# Patient Record
Sex: FEMALE | Race: WHITE | Employment: FULL TIME | URBAN - METROPOLITAN AREA
[De-identification: names, ages, dates, MRNs, and addresses within clinical notes are randomized per-mention and may not be internally consistent; named-entity substitution may affect disease eponyms.]

---

## 2018-01-03 ENCOUNTER — APPOINTMENT (OUTPATIENT)
Dept: MRI IMAGING | Age: 63
DRG: 103 | End: 2018-01-03
Attending: INTERNAL MEDICINE
Payer: COMMERCIAL

## 2018-01-03 ENCOUNTER — APPOINTMENT (OUTPATIENT)
Dept: CT IMAGING | Age: 63
DRG: 103 | End: 2018-01-03
Attending: EMERGENCY MEDICINE
Payer: COMMERCIAL

## 2018-01-03 ENCOUNTER — HOSPITAL ENCOUNTER (OUTPATIENT)
Age: 63
Setting detail: OBSERVATION
Discharge: HOME | DRG: 103 | End: 2018-01-04
Attending: EMERGENCY MEDICINE | Admitting: INTERNAL MEDICINE
Payer: COMMERCIAL

## 2018-01-03 DIAGNOSIS — H93.A3 PULSATILE TINNITUS OF BOTH EARS: ICD-10-CM

## 2018-01-03 DIAGNOSIS — R42 VERTIGO: ICD-10-CM

## 2018-01-03 DIAGNOSIS — G45.9 TRANSIENT CEREBRAL ISCHEMIA, UNSPECIFIED TYPE: Primary | ICD-10-CM

## 2018-01-03 DIAGNOSIS — R53.1 LEFT-SIDED WEAKNESS: ICD-10-CM

## 2018-01-03 PROBLEM — I63.9 ACUTE ISCHEMIC STROKE (HCC): Status: ACTIVE | Noted: 2018-01-03

## 2018-01-03 LAB
ALBUMIN SERPL-MCNC: 3.9 G/DL (ref 3.5–5)
ALBUMIN/GLOB SERPL: 1.1 {RATIO} (ref 1.1–2.2)
ALP SERPL-CCNC: 99 U/L (ref 45–117)
ALT SERPL-CCNC: 34 U/L (ref 12–78)
ANION GAP SERPL CALC-SCNC: 7 MMOL/L (ref 5–15)
AST SERPL-CCNC: 18 U/L (ref 15–37)
ATRIAL RATE: 71 BPM
BASOPHILS # BLD: 0 K/UL (ref 0–0.1)
BASOPHILS NFR BLD: 0 % (ref 0–1)
BILIRUB SERPL-MCNC: 0.3 MG/DL (ref 0.2–1)
BUN SERPL-MCNC: 15 MG/DL (ref 6–20)
BUN/CREAT SERPL: 19 (ref 12–20)
CALCIUM SERPL-MCNC: 8.9 MG/DL (ref 8.5–10.1)
CALCULATED P AXIS, ECG09: 51 DEGREES
CALCULATED R AXIS, ECG10: 48 DEGREES
CALCULATED T AXIS, ECG11: 59 DEGREES
CHLORIDE SERPL-SCNC: 105 MMOL/L (ref 97–108)
CO2 SERPL-SCNC: 28 MMOL/L (ref 21–32)
CREAT SERPL-MCNC: 0.8 MG/DL (ref 0.55–1.02)
DIAGNOSIS, 93000: NORMAL
EOSINOPHIL # BLD: 0.2 K/UL (ref 0–0.4)
EOSINOPHIL NFR BLD: 2 % (ref 0–7)
ERYTHROCYTE [DISTWIDTH] IN BLOOD BY AUTOMATED COUNT: 13.3 % (ref 11.5–14.5)
GLOBULIN SER CALC-MCNC: 3.7 G/DL (ref 2–4)
GLUCOSE BLD STRIP.AUTO-MCNC: 91 MG/DL (ref 65–100)
GLUCOSE SERPL-MCNC: 96 MG/DL (ref 65–100)
HCT VFR BLD AUTO: 40.6 % (ref 35–47)
HGB BLD-MCNC: 12.8 G/DL (ref 11.5–16)
INR BLD: 1.1 (ref 0.9–1.2)
LYMPHOCYTES # BLD: 2.4 K/UL (ref 0.8–3.5)
LYMPHOCYTES NFR BLD: 23 % (ref 12–49)
MCH RBC QN AUTO: 25.8 PG (ref 26–34)
MCHC RBC AUTO-ENTMCNC: 31.5 G/DL (ref 30–36.5)
MCV RBC AUTO: 81.7 FL (ref 80–99)
MONOCYTES # BLD: 0.5 K/UL (ref 0–1)
MONOCYTES NFR BLD: 5 % (ref 5–13)
NEUTS SEG # BLD: 7.5 K/UL (ref 1.8–8)
NEUTS SEG NFR BLD: 70 % (ref 32–75)
P-R INTERVAL, ECG05: 130 MS
PLATELET # BLD AUTO: 308 K/UL (ref 150–400)
POTASSIUM SERPL-SCNC: 3.7 MMOL/L (ref 3.5–5.1)
PROT SERPL-MCNC: 7.6 G/DL (ref 6.4–8.2)
Q-T INTERVAL, ECG07: 386 MS
QRS DURATION, ECG06: 74 MS
QTC CALCULATION (BEZET), ECG08: 419 MS
RBC # BLD AUTO: 4.97 M/UL (ref 3.8–5.2)
SERVICE CMNT-IMP: NORMAL
SODIUM SERPL-SCNC: 140 MMOL/L (ref 136–145)
VENTRICULAR RATE, ECG03: 71 BPM
WBC # BLD AUTO: 10.6 K/UL (ref 3.6–11)

## 2018-01-03 PROCEDURE — 74011250636 HC RX REV CODE- 250/636: Performed by: INTERNAL MEDICINE

## 2018-01-03 PROCEDURE — 74011250637 HC RX REV CODE- 250/637: Performed by: INTERNAL MEDICINE

## 2018-01-03 PROCEDURE — 93306 TTE W/DOPPLER COMPLETE: CPT | Performed by: INTERNAL MEDICINE

## 2018-01-03 PROCEDURE — 74011250637 HC RX REV CODE- 250/637: Performed by: EMERGENCY MEDICINE

## 2018-01-03 PROCEDURE — 99285 EMERGENCY DEPT VISIT HI MDM: CPT

## 2018-01-03 PROCEDURE — 65660000000 HC RM CCU STEPDOWN

## 2018-01-03 PROCEDURE — 80053 COMPREHEN METABOLIC PANEL: CPT | Performed by: EMERGENCY MEDICINE

## 2018-01-03 PROCEDURE — 82962 GLUCOSE BLOOD TEST: CPT

## 2018-01-03 PROCEDURE — 96372 THER/PROPH/DIAG INJ SC/IM: CPT

## 2018-01-03 PROCEDURE — A9577 INJ MULTIHANCE: HCPCS | Performed by: INTERNAL MEDICINE

## 2018-01-03 PROCEDURE — 36415 COLL VENOUS BLD VENIPUNCTURE: CPT | Performed by: EMERGENCY MEDICINE

## 2018-01-03 PROCEDURE — 99218 HC RM OBSERVATION: CPT

## 2018-01-03 PROCEDURE — 93005 ELECTROCARDIOGRAM TRACING: CPT

## 2018-01-03 PROCEDURE — 85025 COMPLETE CBC W/AUTO DIFF WBC: CPT | Performed by: EMERGENCY MEDICINE

## 2018-01-03 PROCEDURE — 85610 PROTHROMBIN TIME: CPT

## 2018-01-03 PROCEDURE — 70548 MR ANGIOGRAPHY NECK W/DYE: CPT

## 2018-01-03 PROCEDURE — 70553 MRI BRAIN STEM W/O & W/DYE: CPT

## 2018-01-03 PROCEDURE — 74011000258 HC RX REV CODE- 258: Performed by: INTERNAL MEDICINE

## 2018-01-03 PROCEDURE — 70450 CT HEAD/BRAIN W/O DYE: CPT

## 2018-01-03 RX ORDER — LORAZEPAM 0.5 MG/1
.25-.5 TABLET ORAL
COMMUNITY
End: 2018-01-04

## 2018-01-03 RX ORDER — ENOXAPARIN SODIUM 100 MG/ML
40 INJECTION SUBCUTANEOUS EVERY 24 HOURS
Status: DISCONTINUED | OUTPATIENT
Start: 2018-01-03 | End: 2018-01-04 | Stop reason: HOSPADM

## 2018-01-03 RX ORDER — LEVOTHYROXINE SODIUM 25 UG/1
25 TABLET ORAL
COMMUNITY

## 2018-01-03 RX ORDER — ACETAMINOPHEN 325 MG/1
650 TABLET ORAL
Status: DISCONTINUED | OUTPATIENT
Start: 2018-01-03 | End: 2018-01-04 | Stop reason: HOSPADM

## 2018-01-03 RX ORDER — GUAIFENESIN 100 MG/5ML
325 LIQUID (ML) ORAL
Status: COMPLETED | OUTPATIENT
Start: 2018-01-03 | End: 2018-01-03

## 2018-01-03 RX ORDER — PRAVASTATIN SODIUM 40 MG/1
40 TABLET ORAL
Status: DISCONTINUED | OUTPATIENT
Start: 2018-01-03 | End: 2018-01-04

## 2018-01-03 RX ORDER — SODIUM CHLORIDE 9 MG/ML
75 INJECTION, SOLUTION INTRAVENOUS CONTINUOUS
Status: DISPENSED | OUTPATIENT
Start: 2018-01-03 | End: 2018-01-04

## 2018-01-03 RX ORDER — GUAIFENESIN 100 MG/5ML
81 LIQUID (ML) ORAL DAILY
Status: DISCONTINUED | OUTPATIENT
Start: 2018-01-04 | End: 2018-01-04

## 2018-01-03 RX ORDER — ACETAMINOPHEN 500 MG
500 TABLET ORAL
COMMUNITY

## 2018-01-03 RX ORDER — VENLAFAXINE HYDROCHLORIDE 37.5 MG/1
37.5 CAPSULE, EXTENDED RELEASE ORAL DAILY
COMMUNITY

## 2018-01-03 RX ORDER — ACETAMINOPHEN 650 MG/1
650 SUPPOSITORY RECTAL
Status: DISCONTINUED | OUTPATIENT
Start: 2018-01-03 | End: 2018-01-04 | Stop reason: HOSPADM

## 2018-01-03 RX ADMIN — ASPIRIN 81 MG 324 MG: 81 TABLET ORAL at 15:29

## 2018-01-03 RX ADMIN — SODIUM CHLORIDE 40 ML: 900 INJECTION, SOLUTION INTRAVENOUS at 18:00

## 2018-01-03 RX ADMIN — GADOBENATE DIMEGLUMINE 13 ML: 529 INJECTION, SOLUTION INTRAVENOUS at 18:00

## 2018-01-03 RX ADMIN — SODIUM CHLORIDE 75 ML/HR: 900 INJECTION, SOLUTION INTRAVENOUS at 19:17

## 2018-01-03 RX ADMIN — ENOXAPARIN SODIUM 40 MG: 40 INJECTION SUBCUTANEOUS at 19:17

## 2018-01-03 NOTE — IP AVS SNAPSHOT
[address] 
[phone] Patient: Gisselle Kang MRN: [id number] UWW:[id number] About your hospitalization You were admitted on:  January 3, 2018 You last received care in the:  [facility] 6S NEURO-SCI TELE You were discharged on:  January 4, 2018 Why you were hospitalized Your primary diagnosis was:  Left-Sided Weakness Your diagnoses also included:  Vertigo, Pulsatile Tinnitus Of Both Ears Follow-up Information Follow up With Details Comments Contact Info Sophia Botello MD  follow up post hospitalization [address] [address] [address] [facility] [address] 
[phone] Discharge Orders None A check carlos indicates which time of day the medication should be taken. My Medications CONTINUE taking these medications Instructions Each Dose to Equal  
 Morning Noon Evening Bedtime  
 acetaminophen 500 mg tablet Commonly known as:  TYLENOL Your last dose was: Your next dose is: Take 500 mg by mouth every six (6) hours as needed for Pain. 500 mg  
    
   
   
   
  
 EFFEXOR XR 37.5 mg capsule Generic drug:  venlafaxine-SR Your last dose was: Your next dose is: Take 37.5 mg by mouth daily. 37.5 mg  
    
   
   
   
  
 synthroid 25 mcg tablet Generic drug:  levothyroxine Your last dose was: Your next dose is: Take 25 mcg by mouth Daily (before breakfast). 25 mcg STOP taking these medications LORazepam 0.5 mg tablet Commonly known as:  ATIVAN Discharge Instructions Discharge Instructions PATIENT ID: Gisselle Kang MRN: [id number] YOB: 1955 DATE OF ADMISSION: 1/3/2018 11:44 AM   
DATE OF DISCHARGE: 1/4/2018 PRIMARY CARE PROVIDER: Sophia Btoello MD  
 
ATTENDING PHYSICIAN: Najma Degroot MD 
 DISCHARGING PROVIDER: Carlo Pichardo NP To contact this individual call [phone] and ask the  to page. If unavailable ask to be transferred the Adult Hospitalist Department. DISCHARGE DIAGNOSES: Headache CONSULTATIONS: IP CONSULT TO HOSPITALIST 
IP CONSULT TO NEUROLOGY PROCEDURES/SURGERIES: * No surgery found * PENDING TEST RESULTS:  
At the time of discharge the following test results are still pending: none FOLLOW UP APPOINTMENTS:  
Follow-up Information Follow up With Details Comments Contact Info Trent Silva MD  follow up post hospitalization [address] [address] [address] [place] [id number] 
[phone] ADDITIONAL CARE RECOMMENDATIONS:  
All your imaging has been negative for stroke. Your MRA did NOT show any aneurysm Normal echo It appears you had a vertiginous migraine. Please see below for more information DIET: Regular ACTIVITY: as previously WOUND CARE: none EQUIPMENT needed: none DISCHARGE MEDICATIONS: 
 See Medication Reconciliation Form · It is important that you take the medication exactly as they are prescribed. · Keep your medication in the bottles provided by the pharmacist and keep a list of the medication names, dosages, and times to be taken in your wallet. · Do not take other medications without consulting your doctor. NOTIFY YOUR PHYSICIAN FOR ANY OF THE FOLLOWING:  
Fever over 101 degrees for 24 hours. Chest pain, shortness of breath, fever, chills, nausea, vomiting, diarrhea, change in mentation, falling, weakness, bleeding. Severe pain or pain not relieved by medications. Or, any other signs or symptoms that you may have questions about. DISPOSITION: 
xx  Home With: 
 OT  PT  [facility]  RN  
  
 SNF/Inpatient Rehab/LTAC Independent/assisted living Hospice Other: CDMP Checked:  
Yes xx PROBLEM LIST Updated: 
Yes xx Signed:  
Carlo Pichardo NP 
1/4/2018 
3:54 PM 
 
 
 Migraine Headache: Care Instructions Your Care Instructions Migraines are painful, throbbing headaches that often start on one side of the head. They may cause nausea and vomiting and make you sensitive to light, sound, or smell. Without treatment, migraines can last from 4 hours to a few days. Medicines can help prevent migraines or stop them after they have started. Your doctor can help you find which ones work best for you. Follow-up care is a key part of your treatment and safety. Be sure to make and go to all appointments, and call your doctor if you are having problems. It's also a good idea to know your test results and keep a list of the medicines you take. How can you care for yourself at home? · Do not drive if you have taken a prescription pain medicine. · Rest in a quiet, dark room until your headache is gone. Close your eyes, and try to relax or go to sleep. Don't watch TV or read. · Put a cold, moist cloth or cold pack on the painful area for 10 to 20 minutes at a time. Put a thin cloth between the cold pack and your skin. · Use a warm, moist towel or a heating pad set on low to relax tight shoulder and neck muscles. · Have someone gently massage your neck and shoulders. · Take your medicines exactly as prescribed. Call your doctor if you think you are having a problem with your medicine. You will get more details on the specific medicines your doctor prescribes. · Be careful not to take pain medicine more often than the instructions allow. You could get worse or more frequent headaches when the medicine wears off. To prevent migraines · Keep a headache diary so you can figure out what triggers your headaches. Avoiding triggers may help you prevent headaches. Record when each headache began, how long it lasted, and what the pain was like.  (Was it throbbing, aching, stabbing, or dull?) Write down any other symptoms you had with the headache, such as nausea, flashing lights or dark spots, or sensitivity to bright light or loud noise. Note if the headache occurred near your period. List anything that might have triggered the headache. Triggers may include certain foods (chocolate, cheese, wine) or odors, smoke, bright light, stress, or lack of sleep. · If your doctor has prescribed medicine for your migraines, take it as directed. You may have medicine that you take only when you get a migraine and medicine that you take all the time to help prevent migraines. ¨ If your doctor has prescribed medicine for when you get a headache, take it at the first sign of a migraine, unless your doctor has given you other instructions. ¨ If your doctor has prescribed medicine to prevent migraines, take it exactly as prescribed. Call your doctor if you think you are having a problem with your medicine. · Find healthy ways to deal with stress. Migraines are most common during or right after stressful times. Take time to relax before and after you do something that has caused a migraine in the past. 
· Try to keep your muscles relaxed by keeping good posture. Check your jaw, face, neck, and shoulder muscles for tension. Try to relax them. When you sit at a desk, change positions often. And make sure to stretch for 30 seconds each hour. · Get plenty of sleep and exercise. · Eat meals on a regular schedule. Avoid foods and drinks that often trigger migraines. These include chocolate, alcohol (especially red wine and port), aspartame, monosodium glutamate (MSG), and some additives found in foods (such as hot dogs, dasilva, cold cuts, aged cheeses, and pickled foods). · Limit caffeine. Don't drink too much coffee, tea, or soda. But don't quit caffeine suddenly. That can also give you migraines. · Do not smoke or allow others to smoke around you. If you need help quitting, talk to your doctor about stop-smoking programs and medicines. These can increase your chances of quitting for good. · If you are taking birth control pills or hormone therapy, talk to your doctor about whether they are triggering your migraines. When should you call for help? Call 911 anytime you think you may need emergency care. For example, call if: 
? · You have signs of a stroke. These may include: 
¨ Sudden numbness, paralysis, or weakness in your face, arm, or leg, especially on only one side of your body. ¨ Sudden vision changes. ¨ Sudden trouble speaking. ¨ Sudden confusion or trouble understanding simple statements. ¨ Sudden problems with walking or balance. ¨ A sudden, severe headache that is different from past headaches. ?Call your doctor now or seek immediate medical care if: 
? · You have new or worse nausea and vomiting. ? · You have a new or higher fever. ? · Your headache gets much worse. ? Watch closely for changes in your health, and be sure to contact your doctor if: 
? · You are not getting better after 2 days (48 hours). Where can you learn more? Go to http://barb-leo.info/. Enter H473 in the search box to learn more about \"Migraine Headache: Care Instructions. \" Current as of: October 14, 2016 Content Version: 11.4 © 0249-3748 [employer]. Care instructions adapted under license by [employer] (which disclaims liability or warranty for this information). If you have questions about a medical condition or this instruction, always ask your healthcare professional. [address] [address] any warranty or liability for your use of this information. 
  
 
  
Dizziness: Care Instructions Your Care Instructions Dizziness is the feeling of unsteadiness or fuzziness in your head. It is different than having vertigo, which is a feeling that the room is spinning or that you are moving or falling. It is also different from lightheadedness, which is the feeling that you are about to faint. It can be hard to know what causes dizziness. Some people feel dizzy when they have migraine headaches. Sometimes bouts of flu can make you feel dizzy. Some medical conditions, such as heart problems or high blood pressure, can make you feel dizzy. Many medicines can cause dizziness, including medicines for high blood pressure, pain, or anxiety. If a medicine causes your symptoms, your doctor may recommend that you stop or change the medicine. If it is a problem with your heart, you may need medicine to help your heart work better. If there is no clear reason for your symptoms, your doctor may suggest watching and waiting for a while to see if the dizziness goes away on its own. Follow-up care is a key part of your treatment and safety. Be sure to make and go to all appointments, and call your doctor if you are having problems. It's also a good idea to know your test results and keep a list of the medicines you take. How can you care for yourself at home? · If your doctor recommends or prescribes medicine, take it exactly as directed. Call your doctor if you think you are having a problem with your medicine. · Do not drive while you feel dizzy. · Try to prevent falls. Steps you can take include: ¨ Using nonskid mats, adding grab bars near the tub, and using night-lights. ¨ Clearing your home so that walkways are free of anything you might trip on. ¨ Letting family and friends know that you have been feeling dizzy. This will help them know how to help you. When should you call for help? Call 911 anytime you think you may need emergency care. For example, call if: 
? · You passed out (lost consciousness). ? · You have dizziness along with symptoms of a heart attack. These may include: ¨ Chest pain or pressure, or a strange feeling in the chest. 
¨ Sweating. ¨ Shortness of breath. ¨ Nausea or vomiting.  
¨ Pain, pressure, or a strange feeling in the back, neck, jaw, or upper belly or in one or both shoulders or arms. ¨ Lightheadedness or sudden weakness. ¨ A fast or irregular heartbeat. ? · You have symptoms of a stroke. These may include: 
¨ Sudden numbness, tingling, weakness, or loss of movement in your face, arm, or leg, especially on only one side of your body. ¨ Sudden vision changes. ¨ Sudden trouble speaking. ¨ Sudden confusion or trouble understanding simple statements. ¨ Sudden problems with walking or balance. ¨ A sudden, severe headache that is different from past headaches. ?Call your doctor now or seek immediate medical care if: 
? · You feel dizzy and have a fever, headache, or ringing in your ears. ? · You have new or increased nausea and vomiting. ? · Your dizziness does not go away or comes back. ? Watch closely for changes in your health, and be sure to contact your doctor if: 
? · You do not get better as expected. Where can you learn more? Go to http://barb-leo.info/. Enter C961 in the search box to learn more about \"Dizziness: Care Instructions. \" Current as of: March 20, 2017 Content Version: 11.4 © 6838-3067 [employer]. Care instructions adapted under license by [employer] (which disclaims liability or warranty for this information). If you have questions about a medical condition or this instruction, always ask your healthcare professional. [address] any warranty or liability for your use of this information. [employer] Announcement We are excited to announce that we are making your provider's discharge notes available to you in [employer]ravo[employer]. You will see these notes when they are completed and signed by the physician that discharged you from your recent hospital stay.   If you have any questions or concerns about any information you see in BravoSolution, please call the Health Information Department where you were seen or reach out to your Primary Care Provider for more information about your plan of care. Introducing [facility] HE[facility]! Chun Webster introduces MyFreightWorld patient portal. Now you can access parts of your medical record, email your doctor's office, and request medication refills online. 1. In your internet browser, go to [email or web address] 2. Click on the First Time User? Click Here link in the Sign In box. You will see the New Member Sign Up page. 3. Enter your MyFreightWorld Access Code exactly as it appears below. You will not need to use this code after youve completed the sign-up process. If you do not sign up before the expiration date, you must request a new code. · MyFreightWorld Access Code: [email or web address] Expires: 4/4/2018  5:38 PM 
 
4. Enter the last four digits of your Social Security Number (xxxx) and Date of Birth (mm/dd/yyyy) as indicated and click Submit. You will be taken to the next sign-up page. 5. Create a My[employer]ightWorld ID. This will be your MyFreightWorld login ID and cannot be changed, so think of one that is secure and easy to remember. 6. Create a MyFreightWorld password. You can change your password at any time. 7. Enter your Password Reset Question and Answer. This can be used at a later time if you forget your password. 8. Enter your e-mail address. You will receive e-mail notification when new information is available in [address] 9. Click Sign Up. You can now view and download portions of your medical record. 10. Click the Download Summary menu link to download a portable copy of your medical information. If you have questions, please visit the Frequently Asked Questions section of the MyFreightWorld website. Remember, MyFreightWorld is NOT to be used for urgent needs. For medical emergencies, dial 911. Now available from your iPhone and Android! Providers Seen During Your Hospitalization Provider Specialty Primary office phone Gennaro Soares MD Emergency Medicine [phone] Anh Palacios MD Internal Medicine [phone] Les Don MD Internal Medicine [phone] Your Primary Care Physician (PCP) Primary Care Physician Office Phone Office Fax Harriett Branham [phone] You are allergic to the following Allergen Reactions Sulfa (Sulfonamide Antibiotics) Diarrhea Nausea and Vomiting Recent Documentation Height Weight BMI Smoking Status 1.575 m 63.5 kg 25.61 kg/m2 Never Assessed Emergency Contacts Name Discharge Info Relation Home Work Mobile Anita Webster DISCHARGE CAREGIVER [3] Sister [23]   [phone] Patient Belongings The following personal items are in your possession at time of discharge: 
  Dental Appliances: None  Visual Aid: Glasses, With patient      Home Medications: Kept at bedside   Jewelry: With patient  Clothing: With patient    Other Valuables: With patient Please provide this summary of care documentation to your next provider. Signatures-by signing, you are acknowledging that this After Visit Summary has been reviewed with you and you have received a copy. Patient Signature:  ____________________________________________________________ Date:  ____________________________________________________________  
  
[facility] Provider Signature:  ____________________________________________________________ Date:  ____________________________________________________________

## 2018-01-03 NOTE — IP AVS SNAPSHOT
[address] 
[phone] Patient: Jethro Mckinney MRN: [id number] BXW:[id number] A check carlos indicates which time of day the medication should be taken. My Medications CONTINUE taking these medications Instructions Each Dose to Equal  
 Morning Noon Evening Bedtime  
 acetaminophen 500 mg tablet Commonly known as:  TYLENOL Your last dose was: Your next dose is: Take 500 mg by mouth every six (6) hours as needed for Pain. 500 mg  
    
   
   
   
  
 EFFEXOR XR 37.5 mg capsule Generic drug:  venlafaxine-SR Your last dose was: Your next dose is: Take 37.5 mg by mouth daily. 37.5 mg  
    
   
   
   
  
 synthroid 25 mcg tablet Generic drug:  levothyroxine Your last dose was: Your next dose is: Take 25 mcg by mouth Daily (before breakfast). 25 mcg STOP taking these medications LORazepam 0.5 mg tablet Commonly known as:  ATIVAN

## 2018-01-03 NOTE — PROGRESS NOTES
Spiritual Care Assessment/Progress Notes    Sabina Marquez [id number]  [id number]    1955  65 y.o.  female    Patient Telephone Number: There is no home phone number on file. Synagogue Affiliation:    Language:    No emergency contact information on file. There are no active problems to display for this patient. Date: 1/3/2018       Level of Synagogue/Spiritual Activity:  [x]         Involved in chapo tradition/spiritual practice    []         Not involved in chapo tradition/spiritual practice  []         Spiritually oriented    []         Claims no spiritual orientation    []         seeking spiritual identity  []         Feels alienated from Islam practice/tradition  []         Feels angry about Islam practice/tradition  []         Spirituality/Islam tradition a resource for coping at this time.   []         Not able to assess due to medical condition    Services Provided Today:  []         crisis intervention    []         reading Scriptures  [x]         spiritual assessment    []         prayer  [x]         empathic listening/emotional support  []         rites and rituals (cite in comments)  []         life review     []         Islam support  []         theological development   []         advocacy  []         ethical dialog     []         blessing  []         bereavement support    []         support to family  []         anticipatory grief support   []         help with AMD  []         spiritual guidance    []         meditation      Spiritual Care Needs  []         Emotional Support  []         Spiritual/Synagogue Care  []         Loss/Adjustment  []         Advocacy/Referral                /Ethics  [x]         No needs expressed at               this time  []         Other: (note in               comments)  [address]  []         Follow up visits with               pt/family  []         Provide materials  []         Schedule sacraments  []         Contact Community Clergy  [x]         Follow up as needed  []         Other: (note in               comments)     Comments: I responded to the code stroke and first met Ms. Webster's  at [facility]. He was in the quinn as Ms. Guillermina Maldonado was being attended to by staff. 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 I provided pastoral support and assisted Ms. Guillermina Maldonado with a request to contact a family member of hers who works at [employer]. She had no further spiritual or emotional needs at the time and I assured her of our ongoing availability. Along with her , she was also accompanied by her sister. Spiritual Care remains available if needed. Rev. Carla Patel M.Div, [facility]

## 2018-01-03 NOTE — PROGRESS NOTES
Admission Medication Reconciliation:    Information obtained from: Patient, RX query, patient's medication list    Significant PMH/Disease States:   Past Medical History:   Diagnosis Date    Anxiety     Hypotension     Thyroid disease        Chief Complaint for this Admission:  Dizziness    Allergies:  Sulfa (sulfonamide antibiotics)    Prior to Admission Medications:   Prior to Admission Medications   Prescriptions Last Dose Informant Patient Reported? Taking? LORazepam (ATIVAN) 0.5 mg tablet 1/3/2018 at Unknown time  Yes Yes   Sig: Take 0.25-0.5 mg by mouth daily as needed for Anxiety. acetaminophen (TYLENOL) 500 mg tablet   Yes Yes   Sig: Take 500 mg by mouth every six (6) hours as needed for Pain.   levothyroxine (SYNTHROID) 25 mcg tablet 1/3/2018 at 0715  Yes Yes   Sig: Take 25 mcg by mouth Daily (before breakfast). venlafaxine-SR (EFFEXOR XR) 37.5 mg capsule 1/3/2018 at 0800  Yes Yes   Sig: Take 37.5 mg by mouth daily.       Facility-Administered Medications: None         Comments/Recommendations: added the above medications

## 2018-01-03 NOTE — ED PROVIDER NOTES
HPI Comments: 65 y.o. right-handed (\"mostly right-handed, somewhat ambidextrous) female with past medical history significant for hypotension, anxiety who presents from [facility] with chief complaint of headache. Patient complains of lightheadedness and dizziness for the past 3-4 hours with associated nausea and feeling off balance while walking. Patient developed a headache that \"started in the occipital, then moved to the temporal lobes\" approximately 3 hours ago. Patient reports over the past 20 minutes she developed left hand weakness and left leg weakness. She also notes speech difficulty starting within the past 20 minutes. Patient also reports tingling to bilateral fingers. No vomiting. There are no other acute medical concerns at this time. PCP: No primary care provider on file. Note written by Romina Bassett. Ghazala Fields, as dictated by Sherin Moe MD 12:05 PM      The history is provided by the patient. Past Medical History:   Diagnosis Date    Anxiety     Hypotension     Thyroid disease        History reviewed. No pertinent surgical history. History reviewed. No pertinent family history. Social History     Social History    Marital status: N/A     Spouse name: N/A    Number of children: N/A    Years of education: N/A     Occupational History    Not on file. Social History Main Topics    Smoking status: Not on file    Smokeless tobacco: Not on file    Alcohol use Not on file    Drug use: Not on file    Sexual activity: Not on file     Other Topics Concern    Not on file     Social History Narrative    No narrative on file         ALLERGIES: Sulfa (sulfonamide antibiotics)    Review of Systems   Constitutional: Negative for fever. Eyes: Negative for visual disturbance. Respiratory: Negative for cough, shortness of breath and wheezing. Cardiovascular: Negative for chest pain and leg swelling. Gastrointestinal: Positive for nausea.  Negative for abdominal pain, diarrhea and vomiting. Genitourinary: Negative for dysuria. Musculoskeletal: Positive for gait problem. Negative for back pain and neck stiffness. Skin: Negative for rash. Neurological: Positive for dizziness, speech difficulty, weakness (left hand, left leg), light-headedness, numbness and headaches. Negative for syncope. Psychiatric/Behavioral: Negative for confusion. All other systems reviewed and are negative. Vitals:    01/03/18 1148 01/03/18 1204   BP: 146/83    Pulse: 86    Resp: 16    SpO2: 95% 99%   Weight: 64.4 kg (142 lb 1 oz)    Height: 5' 2\" (1.575 m)             Physical Exam   Constitutional: She appears well-developed and well-nourished. No distress. HENT:   Head: Normocephalic. Eyes: Pupils are equal, round, and reactive to light. Neck: Normal range of motion. Cardiovascular: Normal rate and regular rhythm. No murmur heard. Pulmonary/Chest: Effort normal and breath sounds normal. No respiratory distress. Abdominal: Soft. There is no tenderness. Musculoskeletal: Normal range of motion. She exhibits no edema. Neurological: She is alert. +left arm drift. When patient raises left leg, she complains of left leg weakness. Patient reports speech difficulty though speech seems normal.   Skin: Skin is warm and dry. Psychiatric: She has a normal mood and affect. Her behavior is normal.   Nursing note and vitals reviewed. Note written by Richa Elam. Angelica Abdul, as dictated by Kal Ogden MD 12:05 PM       [facility]  ED Course       Procedures      CONSULT NOTE:  11:54 AM Kal Ogden MD spoke with Dr. Tran Wilks, Consult for Tele-Neurology. Discussed available diagnostic tests and clinical findings. He is in agreement with care plans as outlined. Dr. Tran Wilks will evaluate patient. ED EKG interpretation:  Rhythm: normal sinus rhythm; and regular . Rate (approx.): 71; ST/T wave: no acute changes; Note written by Richa Elam.  Angelica Abdul, as dictated by Rajat Stubbs MD 12:18 PM    CONSULT NOTE:  12:38 PM Rajat Stubbs MD spoke with Dr. Welford Phoenix, Consult for Tele-Neurology. Discussed available diagnostic tests and clinical findings. He is in agreement with care plans as outlined. Dr. Welford Phoenix states patient is outside the tPA window and recommends admission for stroke workup. CONSULT NOTE:  12:55 PM Rajat Stubbs MD spoke with Dr. Shellye Krabbe, Consult for Hospitalist.  Discussed available diagnostic tests and clinical findings. He is in agreement with care plans as outlined. Dr. Shellye Krabbe will admit patient.

## 2018-01-03 NOTE — ROUTINE PROCESS
TRANSFER - OUT REPORT:    Verbal report given to Russell Silva RN (name) on Pretty Johns  being transferred to [facility] (unit) for routine progression of care       Report consisted of patients Situation, Background, Assessment and   Recommendations(SBAR). Information from the following report(s) SBAR, ED Summary and MAR was reviewed with the receiving nurse. Lines:   Peripheral IV 01/03/18 Left (Active)        Opportunity for questions and clarification was provided.       Patient transported with:   [employer]

## 2018-01-03 NOTE — H&P
[address]  HISTORY AND PHYSICAL      Nona Pedersen  MR#: [id number]  : 1955  ACCOUNT #: [de-identified]   ADMIT DATE: 2018    PRIMARY CARE PHYSICIAN:  Dr. Trent Silva. CHIEF COMPLAINT:  Left-sided weakness. HISTORY OF PRESENT ILLNESS:  The patient is a 21-year-old female with past medical history of anxiety disorder who presented with chief complaint as above. Patient states that she had gotten to work earlier this morning. Sometime around 8:30 a.m., she started feeling unusual.  She felt generally unwell and therefore asked her boss if she could leave work. Her boss obliged and also took her to the [facility] for evaluation. At [facility] she was assessed and during physical exam she was noted to have some weakness in the upper extremity and therefore arrangement was made to bring her to the emergency room for evaluation. In the ED, she was seen as a code stroke; however, all of the evaluation including imaging was negative. Patient denies any dizziness at this time, but she does have a headache. Denies fever or chills. Denies vomiting, had some nausea. Denies any cough, chest pain or shortness of breath. PAST MEDICAL HISTORY:  Includes anxiety disorder. ALLERGIES:  SHE HAS NO KNOWN DRUG ALLERGIES. CURRENT MEDICATIONS:  Tylenol as needed, Synthroid 25 mcg daily, lorazepam 0.5 mg as needed and Effexor 37.5 mg daily. SOCIAL HISTORY:  She does not smoke. She drinks alcohol socially. Code status is FULL CODE. FAMILY HISTORY:  No pertinent positives. REVIEW OF SYSTEMS:  Described on HPI. In addition, no other findings are noted. PHYSICAL EXAMINATION:  VITAL SIGNS:  Blood pressure is 120/65, heart rate 73, temperature pending, respiratory rate 14. O2 100% on room air. GENERAL:  She is lying in bed in no apparent distress. HEENT:  Normocephalic. Not pale. NECK:  No JVD. No carotid bruit.   CHEST:  Clear bilaterally to auscultation. HEART:  S1, S2, regular rate and rhythm. ABDOMEN:  Nontender and bowel sounds present. CENTRAL NERVOUS SYSTEM:  Alert, oriented to person, place and time. Power extremities 5/5. She has a left pronator drift. EXTREMITIES:  No pedal edema. Pulses present. LABORATORY DATA:  Showed normal CBC, normal serum chemistry, normal CT of the head. EKG showed normal sinus rhythm. ASSESSMENT AND PLAN:  Patient is a 81-year-old female with past history as above, presents here with chief complaint as noted. IMPRESSION:  1. Possible stroke. Patient is being admitted to neuro telemetry floor. 2.  Stroke. The patient's initial workup was essentially negative. We will have further evaluation with an MRI/MRA of the head and neck, also 2D echo will be ordered. Patient consultation on aspirin as well as a statin. I have consulted neurology to follow up on this and we will follow up with their recommendations as well. Patient will also have a  physical therapy evaluation. 3.  Home medications were continued. On behalf of [address] [place] [address] hospitalist group, I would like to thank you for the opportunity to participate in the care of this patient.       MD ANDI Copeland/POLY  D: 01/03/2018 13:49     T: 01/03/2018 14:13  JOB #: [id number]  CC: Wei Weldon MD

## 2018-01-03 NOTE — PROGRESS NOTES
TRANSFER - IN REPORT:    Verbal report received from Ana Bowser RN(name) on Thor An  being received from ED(unit) for routine progression of care      Report consisted of patients Situation, Background, Assessment and   Recommendations(SBAR). Information from the following report(s) SBAR, ED Summary, Intake/Output, MAR, Recent Results and Cardiac Rhythm NSR was reviewed with the receiving nurse. Opportunity for questions and clarification was provided. Assessment completed upon patients arrival to unit and care assumed. Skin Assessment:  Primary Nurse Jani Lieberman and Arley Ram RN performed a dual skin assessment on this patient No impairment noted  Markie score is 23    1930: Bedside shift change report given to Doug Pérez RN (oncoming nurse) by Alem Gunter RN (offgoing nurse). Report included the following information SBAR, Kardex, Intake/Output, MAR, Recent Results and Cardiac Rhythm NSR.

## 2018-01-03 NOTE — ED NOTES
Lightheadedness and dizziness with headache that began around 0800 today. One episode of dizziness about a week ago. Patient began with nausea aroun 0930. [facility] and was [facility] here for stroke workup, she says she noticed left arm weakness when they examined her at [facility]. She reports numbness and tingling in her bilateral hands/fingertips.

## 2018-01-03 NOTE — ED TRIAGE NOTES
Pt c/o headache and dizziness, seen at md office and they noticed her left side had decreased sensation, +ataxia, +headache, pt went to bed at 10 pm feeling normal, woke up at 6 am and is unsure if she felt dizziness then

## 2018-01-04 ENCOUNTER — APPOINTMENT (OUTPATIENT)
Dept: MRI IMAGING | Age: 63
DRG: 103 | End: 2018-01-04
Attending: PSYCHIATRY & NEUROLOGY
Payer: COMMERCIAL

## 2018-01-04 VITALS
DIASTOLIC BLOOD PRESSURE: 64 MMHG | HEART RATE: 92 BPM | OXYGEN SATURATION: 98 % | WEIGHT: 140 LBS | RESPIRATION RATE: 21 BRPM | TEMPERATURE: 97.9 F | SYSTOLIC BLOOD PRESSURE: 118 MMHG | BODY MASS INDEX: 25.76 KG/M2 | HEIGHT: 62 IN

## 2018-01-04 PROBLEM — R53.1 LEFT-SIDED WEAKNESS: Status: ACTIVE | Noted: 2018-01-03

## 2018-01-04 PROBLEM — H93.A3 PULSATILE TINNITUS OF BOTH EARS: Status: ACTIVE | Noted: 2018-01-04

## 2018-01-04 PROBLEM — R53.1 LEFT-SIDED WEAKNESS: Status: RESOLVED | Noted: 2018-01-03 | Resolved: 2018-01-04

## 2018-01-04 PROBLEM — H93.A3 PULSATILE TINNITUS OF BOTH EARS: Status: RESOLVED | Noted: 2018-01-04 | Resolved: 2018-01-04

## 2018-01-04 PROBLEM — R42 VERTIGO: Status: ACTIVE | Noted: 2018-01-04

## 2018-01-04 PROBLEM — R42 VERTIGO: Status: RESOLVED | Noted: 2018-01-04 | Resolved: 2018-01-04

## 2018-01-04 LAB
CHOLEST SERPL-MCNC: 198 MG/DL
EST. AVERAGE GLUCOSE BLD GHB EST-MCNC: 114 MG/DL
HBA1C MFR BLD: 5.6 % (ref 4.2–6.3)
HDLC SERPL-MCNC: 43 MG/DL
HDLC SERPL: 4.6 {RATIO} (ref 0–5)
LDLC SERPL CALC-MCNC: 119.2 MG/DL (ref 0–100)
LIPID PROFILE,FLP: ABNORMAL
TRIGL SERPL-MCNC: 179 MG/DL (ref ?–150)
VLDLC SERPL CALC-MCNC: 35.8 MG/DL

## 2018-01-04 PROCEDURE — G8980 MOBILITY D/C STATUS: HCPCS

## 2018-01-04 PROCEDURE — 96365 THER/PROPH/DIAG IV INF INIT: CPT

## 2018-01-04 PROCEDURE — 99218 HC RM OBSERVATION: CPT

## 2018-01-04 PROCEDURE — 96374 THER/PROPH/DIAG INJ IV PUSH: CPT

## 2018-01-04 PROCEDURE — G8979 MOBILITY GOAL STATUS: HCPCS

## 2018-01-04 PROCEDURE — G8978 MOBILITY CURRENT STATUS: HCPCS

## 2018-01-04 PROCEDURE — 74011250637 HC RX REV CODE- 250/637: Performed by: INTERNAL MEDICINE

## 2018-01-04 PROCEDURE — 97161 PT EVAL LOW COMPLEX 20 MIN: CPT

## 2018-01-04 PROCEDURE — 97116 GAIT TRAINING THERAPY: CPT

## 2018-01-04 PROCEDURE — 80061 LIPID PANEL: CPT | Performed by: INTERNAL MEDICINE

## 2018-01-04 PROCEDURE — 83036 HEMOGLOBIN GLYCOSYLATED A1C: CPT | Performed by: INTERNAL MEDICINE

## 2018-01-04 PROCEDURE — 97165 OT EVAL LOW COMPLEX 30 MIN: CPT

## 2018-01-04 PROCEDURE — 74011250636 HC RX REV CODE- 250/636: Performed by: PSYCHIATRY & NEUROLOGY

## 2018-01-04 PROCEDURE — 36415 COLL VENOUS BLD VENIPUNCTURE: CPT | Performed by: INTERNAL MEDICINE

## 2018-01-04 PROCEDURE — 74011250637 HC RX REV CODE- 250/637: Performed by: NURSE PRACTITIONER

## 2018-01-04 PROCEDURE — 70544 MR ANGIOGRAPHY HEAD W/O DYE: CPT

## 2018-01-04 RX ORDER — MAGNESIUM SULFATE HEPTAHYDRATE 40 MG/ML
2 INJECTION, SOLUTION INTRAVENOUS ONCE
Status: COMPLETED | OUTPATIENT
Start: 2018-01-04 | End: 2018-01-04

## 2018-01-04 RX ORDER — SODIUM CHLORIDE 0.9 % (FLUSH) 0.9 %
30 SYRINGE (ML) INJECTION
Status: COMPLETED | OUTPATIENT
Start: 2018-01-04 | End: 2018-01-04

## 2018-01-04 RX ORDER — LEVOTHYROXINE SODIUM 25 UG/1
25 TABLET ORAL
Status: DISCONTINUED | OUTPATIENT
Start: 2018-01-04 | End: 2018-01-04 | Stop reason: HOSPADM

## 2018-01-04 RX ORDER — SODIUM CHLORIDE 0.9 % (FLUSH) 0.9 %
SYRINGE (ML) INJECTION
Status: DISCONTINUED
Start: 2018-01-04 | End: 2018-01-04 | Stop reason: HOSPADM

## 2018-01-04 RX ORDER — VENLAFAXINE HYDROCHLORIDE 37.5 MG/1
37.5 CAPSULE, EXTENDED RELEASE ORAL DAILY
Status: DISCONTINUED | OUTPATIENT
Start: 2018-01-04 | End: 2018-01-04 | Stop reason: HOSPADM

## 2018-01-04 RX ADMIN — MAGNESIUM SULFATE HEPTAHYDRATE 2 G: 40 INJECTION, SOLUTION INTRAVENOUS at 12:34

## 2018-01-04 RX ADMIN — VENLAFAXINE HYDROCHLORIDE 37.5 MG: 37.5 CAPSULE, EXTENDED RELEASE ORAL at 08:54

## 2018-01-04 RX ADMIN — Medication 30 ML: at 11:56

## 2018-01-04 RX ADMIN — ASPIRIN 81 MG 81 MG: 81 TABLET ORAL at 08:53

## 2018-01-04 NOTE — DISCHARGE INSTRUCTIONS
Discharge Instructions       PATIENT ID: Anibal Duarte  MRN: [id number]   YOB: 1955    DATE OF ADMISSION: 1/3/2018 11:44 AM    DATE OF DISCHARGE: 1/4/2018    PRIMARY CARE PROVIDER: Madelyn Uribe MD     ATTENDING PHYSICIAN: Scott Taylor MD  DISCHARGING PROVIDER: Heide Sauer NP    To contact this individual call [phone] and ask the  to page. If unavailable ask to be transferred the Adult Hospitalist Department. DISCHARGE DIAGNOSES: Headache    CONSULTATIONS: IP CONSULT TO HOSPITALIST  IP CONSULT TO NEUROLOGY    PROCEDURES/SURGERIES: * No surgery found *    PENDING TEST RESULTS:   At the time of discharge the following test results are still pending: none    FOLLOW UP APPOINTMENTS:   Follow-up Information     Follow up With Details Comments Contact Info    Madelyn Uribe MD  follow up post hospitalization [address] [place] [address]  [phone]             ADDITIONAL CARE RECOMMENDATIONS:   All your imaging has been negative for stroke. Your MRA did NOT show any aneurysm  Normal echo  It appears you had a vertiginous migraine. Please see below for more information    DIET: Regular    ACTIVITY: as previously    WOUND CARE: none    EQUIPMENT needed: none      DISCHARGE MEDICATIONS:   See Medication Reconciliation Form    · It is important that you take the medication exactly as they are prescribed. · Keep your medication in the bottles provided by the pharmacist and keep a list of the medication names, dosages, and times to be taken in your wallet. · Do not take other medications without consulting your doctor. NOTIFY YOUR PHYSICIAN FOR ANY OF THE FOLLOWING:   Fever over 101 degrees for 24 hours. Chest pain, shortness of breath, fever, chills, nausea, vomiting, diarrhea, change in mentation, falling, weakness, bleeding. Severe pain or pain not relieved by medications.   Or, any other signs or symptoms that you may have questions about.      DISPOSITION:  xx  Home With:   OT  PT  HH  RN       SNF/Inpatient Rehab/LTAC    Independent/assisted living    Hospice    Other:     CDMP Checked:   Yes xx     PROBLEM LIST Updated:  Yes xx       Signed:   Licha VALENCIA NP  1/4/2018  3:54 PM      Migraine Headache: Care Instructions  Your Care Instructions  Migraines are painful, throbbing headaches that often start on one side of the head. They may cause nausea and vomiting and make you sensitive to light, sound, or smell. Without treatment, migraines can last from 4 hours to a few days. Medicines can help prevent migraines or stop them after they have started. Your doctor can help you find which ones work best for you. Follow-up care is a key part of your treatment and safety. Be sure to make and go to all appointments, and call your doctor if you are having problems. It's also a good idea to know your test results and keep a list of the medicines you take. How can you care for yourself at home? · Do not drive if you have taken a prescription pain medicine. · Rest in a quiet, dark room until your headache is gone. Close your eyes, and try to relax or go to sleep. Don't watch TV or read. · Put a cold, moist cloth or cold pack on the painful area for 10 to 20 minutes at a time. Put a thin cloth between the cold pack and your skin. · Use a warm, moist towel or a heating pad set on low to relax tight shoulder and neck muscles. · Have someone gently massage your neck and shoulders. · Take your medicines exactly as prescribed. Call your doctor if you think you are having a problem with your medicine. You will get more details on the specific medicines your doctor prescribes. · Be careful not to take pain medicine more often than the instructions allow. You could get worse or more frequent headaches when the medicine wears off. To prevent migraines  · Keep a headache diary so you can figure out what triggers your headaches.  Avoiding triggers may help you prevent headaches. Record when each headache began, how long it lasted, and what the pain was like. (Was it throbbing, aching, stabbing, or dull?) Write down any other symptoms you had with the headache, such as nausea, flashing lights or dark spots, or sensitivity to bright light or loud noise. Note if the headache occurred near your period. List anything that might have triggered the headache. Triggers may include certain foods (chocolate, cheese, wine) or odors, smoke, bright light, stress, or lack of sleep. · If your doctor has prescribed medicine for your migraines, take it as directed. You may have medicine that you take only when you get a migraine and medicine that you take all the time to help prevent migraines. ¨ If your doctor has prescribed medicine for when you get a headache, take it at the first sign of a migraine, unless your doctor has given you other instructions. ¨ If your doctor has prescribed medicine to prevent migraines, take it exactly as prescribed. Call your doctor if you think you are having a problem with your medicine. · Find healthy ways to deal with stress. Migraines are most common during or right after stressful times. Take time to relax before and after you do something that has caused a migraine in the past.  · Try to keep your muscles relaxed by keeping good posture. Check your jaw, face, neck, and shoulder muscles for tension. Try to relax them. When you sit at a desk, change positions often. And make sure to stretch for 30 seconds each hour. · Get plenty of sleep and exercise. · Eat meals on a regular schedule. Avoid foods and drinks that often trigger migraines. These include chocolate, alcohol (especially red wine and port), aspartame, monosodium glutamate (MSG), and some additives found in foods (such as hot dogs, dasilva, cold cuts, aged cheeses, and pickled foods). · Limit caffeine. Don't drink too much coffee, tea, or soda. But don't quit caffeine suddenly. That can also give you migraines. · Do not smoke or allow others to smoke around you. If you need help quitting, talk to your doctor about stop-smoking programs and medicines. These can increase your chances of quitting for good. · If you are taking birth control pills or hormone therapy, talk to your doctor about whether they are triggering your migraines. When should you call for help? Call 911 anytime you think you may need emergency care. For example, call if:  ? · You have signs of a stroke. These may include:  ¨ Sudden numbness, paralysis, or weakness in your face, arm, or leg, especially on only one side of your body. ¨ Sudden vision changes. ¨ Sudden trouble speaking. ¨ Sudden confusion or trouble understanding simple statements. ¨ Sudden problems with walking or balance. ¨ A sudden, severe headache that is different from past headaches. ?Call your doctor now or seek immediate medical care if:  ? · You have new or worse nausea and vomiting. ? · You have a new or higher fever. ? · Your headache gets much worse. ? Watch closely for changes in your health, and be sure to contact your doctor if:  ? · You are not getting better after 2 days (48 hours). Where can you learn more? Go to http://barb-leo.info/. Enter T923 in the search box to learn more about \"Migraine Headache: Care Instructions. \"  Current as of: October 14, 2016  Content Version: 11.4  © 9649-8239 [employer]. Care instructions adapted under license by [employer] (which disclaims liability or warranty for this information). If you have questions about a medical condition or this instruction, always ask your healthcare professional. [place] [address] any warranty or liability for your use of this information.          Dizziness: Care Instructions  Your Care Instructions  Dizziness is the feeling of unsteadiness or fuzziness in your head.  It is different than having vertigo, which is a feeling that the room is spinning or that you are moving or falling. It is also different from lightheadedness, which is the feeling that you are about to faint. It can be hard to know what causes dizziness. Some people feel dizzy when they have migraine headaches. Sometimes bouts of flu can make you feel dizzy. Some medical conditions, such as heart problems or high blood pressure, can make you feel dizzy. Many medicines can cause dizziness, including medicines for high blood pressure, pain, or anxiety. If a medicine causes your symptoms, your doctor may recommend that you stop or change the medicine. If it is a problem with your heart, you may need medicine to help your heart work better. If there is no clear reason for your symptoms, your doctor may suggest watching and waiting for a while to see if the dizziness goes away on its own. Follow-up care is a key part of your treatment and safety. Be sure to make and go to all appointments, and call your doctor if you are having problems. It's also a good idea to know your test results and keep a list of the medicines you take. How can you care for yourself at home? · If your doctor recommends or prescribes medicine, take it exactly as directed. Call your doctor if you think you are having a problem with your medicine. · Do not drive while you feel dizzy. · Try to prevent falls. Steps you can take include:  ¨ Using nonskid mats, adding grab bars near the tub, and using night-lights. ¨ Clearing your home so that walkways are free of anything you might trip on. ¨ Letting family and friends know that you have been feeling dizzy. This will help them know how to help you. When should you call for help? Call 911 anytime you think you may need emergency care. For example, call if:  ? · You passed out (lost consciousness). ? · You have dizziness along with symptoms of a heart attack.  These may include:  ¨ Chest pain or pressure, or a strange feeling in the chest.  ¨ Sweating. ¨ Shortness of breath. ¨ Nausea or vomiting. ¨ Pain, pressure, or a strange feeling in the back, neck, jaw, or upper belly or in one or both shoulders or arms. ¨ Lightheadedness or sudden weakness. ¨ A fast or irregular heartbeat. ? · You have symptoms of a stroke. These may include:  ¨ Sudden numbness, tingling, weakness, or loss of movement in your face, arm, or leg, especially on only one side of your body. ¨ Sudden vision changes. ¨ Sudden trouble speaking. ¨ Sudden confusion or trouble understanding simple statements. ¨ Sudden problems with walking or balance. ¨ A sudden, severe headache that is different from past headaches. ?Call your doctor now or seek immediate medical care if:  ? · You feel dizzy and have a fever, headache, or ringing in your ears. ? · You have new or increased nausea and vomiting. ? · Your dizziness does not go away or comes back. ? Watch closely for changes in your health, and be sure to contact your doctor if:  ? · You do not get better as expected. Where can you learn more? Go to http://barb-leo.info/. Enter X452 in the search box to learn more about \"Dizziness: Care Instructions. \"  Current as of: March 20, 2017  Content Version: 11.4  © 0923-0281 [employer]. Care instructions adapted under license by [employer] (which disclaims liability or warranty for this information). If you have questions about a medical condition or this instruction, always ask your healthcare professional. [place] [address] any warranty or liability for your use of this information.

## 2018-01-04 NOTE — PROGRESS NOTES
Physical Therapy note  1/4/18    Orders received, chart reviewed and patient evaluated by physical therapy. Recommend patient to discharge to home without further therapy services pending progress with skilled acute care physical therapy. Recommend with nursing patient to complete as able in order to maintain strength, endurance and independence: OOB to chair 3x/day with SUP and ambulating with SUP. Thank you for your assistance. Full evaluation to follow.      Eloina Sigala, PT, DPT

## 2018-01-04 NOTE — PROGRESS NOTES
Occupational Therapy neurological EVALUATION with discharge  Patient: Alex James (79 y.o. female)  Date: 1/4/2018  Primary Diagnosis: Acute ischemic stroke [facility])        Precautions:        ASSESSMENT:  Based on the objective data described below, the patient presents with overall independence for functional mobility and ADLs s/p decreased LUE sensation and mild ataxia/weakness. Patient presenting with overall independence for functional mobility and is ambulatory in room and in bathroom. Patient able to complete all functional tasks but is limited by mild sensory deficits in L dorsal ring and pinky finger. Reports mild weakness, able to complete all testing with maximal resistance but does not feel \"normal\". Patient scoring 66/66 on Fugl Easley. Patient educated on strengthening and neuromuscular exercises to maximize functional use of LUE. Patient with no further acute OT needs at this time. Will complete orders. Further skilled acute occupational therapy is not indicated at this time. Discharge Recommendations: None  Further Equipment Recommendations for Discharge: None noted      SUBJECTIVE:   Patient stated I have one more test I have to pass and then I should be able to go home.     OBJECTIVE DATA SUMMARY:   HISTORY:   Past Medical History:   Diagnosis Date    Anxiety     Hypotension     Thyroid disease    History reviewed. No pertinent surgical history. Prior Level of Function/Environment/Context: Per patient report, lives at home independently. Has family in the area. 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 Active. History of L bicep tear. EXAMINATION OF PERFORMANCE DEFICITS:  Cognitive/Behavioral Status:  Neurologic State: Alert  Orientation Level: Oriented X4  Cognition: Appropriate decision making; Appropriate for age attention/concentration; Appropriate safety awareness; Follows commands  Perception: Appears intact  Perseveration: No perseveration noted  Safety/Judgement: Awareness of environment;Home safety;Good awareness of safety precautions; Insight into deficits    Skin: Appears intact    Edema: None noted in BUEs     Hearing: Auditory  Auditory Impairment: None    Vision/Perceptual:    Tracking: Able to track stimulus in all quadrants w/o difficulty                      Acuity: Within Defined Limits    Corrective Lenses: Reading glasses    Range of Motion:  In BUEs  AROM: Within functional limits  PROM: Within functional limits                      Strength: In BUEs  Strength: Within functional limits (Very mild feelings of weakness in L pinky/ring finger)                Coordination:  Coordination: Within functional limits (Able to complete all tasks - feels \"odd\")  Fine Motor Skills-Upper: Left Intact; Right Intact    Gross Motor Skills-Upper: Left Intact; Right Intact    Tone & Sensation:  In BUEs  Tone: Normal  Sensation: Impaired (Decreased sensation in L pinky/ring fingers on dorsum)                      Balance:  Sitting: Intact  Standing: Intact    Functional Mobility and Transfers for ADLs:  Bed Mobility:       Transfers:  Sit to Stand: Independent  Functional Transfers  Toilet Transfer : Independent    ADL Assessment:  Feeding: Independent    Oral Facial Hygiene/Grooming: Independent    Bathing: Independent    Upper Body Dressing: Independent    Lower Body Dressing: Independent    Toileting: Independent   * Inferred per obs of BUE ROM, functional mobility, activity tolerance, and sensory deficits     ADL Intervention and task modifications:    Cognitive Retraining  Safety/Judgement: Awareness of environment;Home safety;Good awareness of safety precautions; Insight into deficits    Therapeutic Exercise:  - Patient educated on sensory reintegration exercises as well as neuromuscular strengthening exercises to complete at home with various ADL items and textures. Indicating understanding.      Functional Measure:   Fugl-Easley Assessment of Motor Recovery after Stroke:     Reflex Activity  Flexors/Biceps/Fingers: Can be elicited  Extensors/Triceps: Can be elicited  Reflex Subtotal: 4    Volitional Movement Within Synergies  Shoulder Retraction: Full  Shoulder Elevation: Full  Shoulder Abduction (90 degrees): Full  Shoulder External Rotation: Full  Elbow Flexion: Full  Forearm Supination: Full  Shoulder Adduction/Internal Rotation: Full  Elbow Extension: Full  Forearm Pronation: Full  Subtotal: 18    Volitional Movement Mixing Synergies  Hand to Lumbar Spine: Full  Shoulder Flexion (0-90 degrees): Full  Pronation-Supination: Full  Subtotal: 6    Volitional Movement With Little or No Synergy  Shoulder Abduction (0-90 degrees): Full  Shoulder Flexion ( degrees): Full  Pronation/Supination: Full  Subtotal : 6    Normal Reflex Activity  Biceps, Triceps, Finger Flexors: Full  Subtotal : 2    Upper Extremity Total   Upper Extremity Total: 36    Wrist  Stability at 15 Degree Dorsiflexion: Full  Repeated Dorsiflexion/ Volar Flexion: Full  Stability at 15 Degree Dorsiflexion: Full  Repeated Dorsiflexion/ Volar Flexion: Full  Circumduction: Full  Wrist Total: 10    Hand  Mass Flexion: Full  Mass Extension: Full  Grasp A: Full  Grasp B: Full  Grasp C: Full  Grasp D: Full  Grasp E: Full  Hand Total: 14    Coordination/Speed  Tremor: None  Dysmetria: None  Time: <1s  Coordination/Speed Total : 6    Total A-D  Total A-D (Motor Function): 66/66     Percentage of impairment CH  0% CI  1-19% CJ  20-39% CK  40-59% CL  60-79% CM  80-99% CN  100%   Fugl-Easley score: 0-66 66 53-65 39-52 26-38 13-25 1-12   0      This is a reliable/valid measure of arm function after a neurological event. It has established value to characterize functional status and for measuring spontaneous and therapy-induced recovery; tests proximal and distal motor functions.  Fugl-Easley Assessment  UE scores recorded between five and 30 days post neurologic event can be used to predict UE recovery at six months post neurologic event. Severe = 0-21 points   Moderately Severe = 22-33 points   Moderate = 34-47 points   Mild = 48-66 points  STERLING Lara, BON Goldsmith, & SAMMY Franklin (1992). Measurement of motor recovery after stroke: Outcome assessment and sample size requirements. Stroke, 23, pp. 9376-6553.   ------------------------------------------------------------------------------------------------------------------------------------------------------------------  MCID:  Stroke:   Nader Mejia et al, 2001; n = 171; mean age 79 (5) years; assessed within 16 (12) days of stroke, Acute Stroke)  FMA Motor Scores from Admission to Discharge   10 point increase in FMA Upper Extremity = 1.5 change in discharge FIM   10 point increase in FMA Lower Extremity = 1.9 change in discharge FIM  MDC:   Stroke:   Skylar Cali et al, 2008, n = 14, mean age = 59.9 (14.6) years, assessed on average 14 (6.5) months post stroke, Chronic Stroke)   FMA = 5.2 points for the Upper Extremity portion of the assessment     G codes: In compliance with CMSs Claims Based Outcome Reporting, the following G-code set was chosen for this patient based on their primary functional limitation being treated: The outcome measure chosen to determine the severity of the functional limitation was the [facility] with a score of 66/66 which was correlated with the impairment scale. ?  Self Care:     - CURRENT STATUS: CH - 0% impaired, limited or restricted    - GOAL STATUS: CH - 0% impaired, limited or restricted    - D/C STATUS:  CH - 0% impaired, limited or restricted      Occupational Therapy Evaluation Charge Determination   History Examination Decision-Making   LOW Complexity : Brief history review  LOW Complexity : 1-3 performance deficits relating to physical, cognitive , or psychosocial skils that result in activity limitations and / or participation restrictions  LOW Complexity : No comorbidities that affect functional and no verbal or physical assistance needed to complete eval tasks       Based on the above components, the patient evaluation is determined to be of the following complexity level: LOW     Pain:  Pain Scale 1: Numeric (0 - 10)  Pain Intensity 1: 0              Activity Tolerance:   Good. Please refer to the flowsheet for vital signs taken during this treatment. After treatment:   [x]  Patient left in no apparent distress sitting up in chair  []  Patient left in no apparent distress in bed  [x]  Call bell left within reach  [x]  Nursing notified  []  Caregiver present  []  Bed alarm activated    COMMUNICATION/EDUCATION:   Findings and recommendations were discussed with: Physical Therapist and Registered Nurse    Patient was educated regarding Her deficit(s) of decreased LUE sensation as this relates to Her diagnosis of possible TIA(?). She demonstrated Good understanding as evidenced by verbal responses. Patient and/or family was verbally educated on the BE FAST acronym for signs/symptoms of CVA and TIA. BE FAST was written on patient's communication board  for visual education and reinforcement. All questions answered with patient indicating good understanding. [x]      Home safety education was provided and the patient/caregiver indicated understanding. [x]      Patient/family have participated as able and agree with findings and recommendations. []      Patient is unable to participate in plan of care at this time.     Thank you for this referral.  Harman Armijo OT  Time Calculation: 12 mins

## 2018-01-04 NOTE — PROGRESS NOTES
Problem: Mobility Impaired (Adult and Pediatric)  Goal: *Acute Goals and Plan of Care (Insert Text)  physical Therapy neuro EVALUATION/discharge     Patient: Jurgen Wise (79 y.o. female)  Date: 1/4/2018  Primary Diagnosis: Acute ischemic stroke [facility])        Precautions:        ASSESSMENT :  Based on the objective data described below, the patient presents at functional baseline level with reported slight decreased sensation to light touch in L 4th and 5th digits, slight L UE dysmetria on FTN, and balance \"feeling not normal\" despite no obvious deviations or difficulties. She was able to ambulate 400ft in hallway with emphasis on integration of DGI components and asc/desc 8 stairs with SUP without LOB. Ayala score = 56/56 indicating low falls risk. CT and MRI were negative for acute process. Prior to this admission, pt reports that she lived at home alone and was indep with all ADLs and mobility w/o use of AD or any recent falls. Anticipate that pt will be safe and appropriate for discharge home without further therapy services. Will complete order and sign off. Skilled physical therapy is not indicated at this time. PLAN :  Discharge Recommendations: None  Further Equipment Recommendations for Discharge: None       SUBJECTIVE:   Patient stated It just doesn't feel normal.    OBJECTIVE DATA SUMMARY:   HISTORY:    Past Medical History:   Diagnosis Date    Anxiety     Hypotension     Thyroid disease    History reviewed. No pertinent surgical history. Prior Level of Function/Home Situation: Lives in an apartment alone and was indep with all ADLs and mobility w/o use of AD. No falls.    Personal factors and/or comorbidities impacting plan of care:     Home Situation  Home Environment: Apartment  # Steps to Enter: 12  Rails to Enter: Yes  Hand Rails : Right  Living Alone: Yes  Support Systems: Friends \ neighbors  Patient Expects to be Discharged to[de-identified] Private residence  Current DME Used/Available at Home: None    EXAMINATION/PRESENTATION/DECISION MAKING:   Critical Behavior:  Neurologic State: Alert  Orientation Level: Oriented X4  Cognition: Appropriate decision making, Follows commands, Appropriate safety awareness  Safety/Judgement: Awareness of environment, Fall prevention, Home safety  Hearing: Auditory  Auditory Impairment: None  Skin:  Intact where exposed  Edema: none noted  Range Of Motion:  AROM: Within functional limits  PROM: Within functional limits     Strength:    Strength:  Within functional limits     Tone & Sensation:   Tone: Normal  Sensation: Impaired (decreased to light touch L 4th &5th digits)       Coordination:  Coordination: Within functional limits (slight dysmetria L FTN)  Vision:   Tracking: Able to track stimulus in all quadrants w/o difficulty  Diplopia: No  Acuity: Within Defined Limits  Corrective Lenses: Reading glasses  Functional Mobility:     Transfers:  Sit to Stand: Supervision  Stand to Sit: Supervision  Bed to Chair: Independent     Balance:   Sitting: Intact  Standing: Intact  Ambulation/Gait Training:  Distance (ft): 400 Feet (ft)  Assistive Device: Gait belt  Ambulation - Level of Assistance: Supervision   Gait Description (WDL): Exceptions to WDL          Stair Training:  Number of Stairs Trained: 8  Stairs - Level of Assistance: Supervision  Rail Use: None    Functional Measure:  Ayala Balance Test:    Sitting to Standin  Standing Unsupported: 4  Sitting with Back Unsupported: 4  Standing to Sittin  Transfers: 4  Standing Unsupported with Eyes Closed: 4  Standing Unsupported with Feet Together: 4  Reach Forward with Outstretched Arm: 4   Object: 4  Turn to Look Over Shoulders: 4  Turn 360 Degrees: 4  Alternate Foot on Step/Stool: 4  Standing Unsupported One Foot in Front: 4  Stand on One Le  Total: 56         56=Maximum possible score;   0-20=High fall risk  21-40=Moderate fall risk   41-56=Low fall risk     Ayala Balance Test and G-code impairment scale:  Percentage of Impairment CH    0%   CI    1-19% CJ    20-39% CK    40-59% CL    60-79% CM    80-99% CN     100%   Ayala   Score 0-56 56 45-55 34-44 23-33 12-22 1-11 0     G codes: In compliance with CMSs Claims Based Outcome Reporting, the following G-code set was chosen for this patient based on their primary functional limitation being treated: The outcome measure chosen to determine the severity of the functional limitation was the Ivana Blades with a score of 56/56 which was correlated with the impairment scale. ? Mobility - Walking and Moving Around:     - CURRENT STATUS: CH - 0% impaired, limited or restricted    - GOAL STATUS: CH - 0% impaired, limited or restricted    - D/C STATUS:  CH - 0% impaired, limited or restricted      Physical Therapy Evaluation Charge Determination   History Examination Presentation Decision-Making   MEDIUM  Complexity : 1-2 comorbidities / personal factors will impact the outcome/ POC  LOW Complexity : 1-2 Standardized tests and measures addressing body structure, function, activity limitation and / or participation in recreation  LOW Complexity : Stable, uncomplicated  LOW Complexity : FOTO score of       Based on the above components, the patient evaluation is determined to be of the following complexity level: LOW     Pain:  Pain Scale 1: Numeric (0 - 10)  Pain Intensity 1: 0     Activity Tolerance:   NAD    Please refer to the flowsheet for vital signs taken during this treatment. After treatment:   [x]         Patient left in no apparent distress sitting up in chair  []         Patient left in no apparent distress in bed  [x]         Call bell left within reach  [x]         Nursing notified  []         Caregiver present  []         Bed alarm activated    COMMUNICATION/EDUCATION:   Patient was educated regarding Her deficit(s) of decreased L UE sensation as this relates to Her diagnosis of r/o TIA/CVA.   She demonstrated Good understanding as evidenced by nodding. Patient and/or family was verbally educated on the BE FAST acronym for signs/symptoms of CVA and TIA. BE FAST was written on patient's communication board  for visual education and reinforcement. All questions answered with patient indicating good understanding. [x]   Fall prevention education was provided and the patient/caregiver indicated understanding. [x]   Patient/family have participated as able and agree with findings and recommendations. []   Patient is unable to participate in plan of care at this time.     Findings and recommendations were discussed with: Occupational Therapist and Registered Nurse    Thank you for this referral.  Myranda Musa, PT, DPT   Time Calculation: 23 mins

## 2018-01-04 NOTE — PROGRESS NOTES
Problem: Ischemic Stroke: Discharge Outcomes  Goal: *Understands and describes signs and symptoms to report to providers(Stroke Metric)  Outcome: Progressing Towards Goal  Patient aware of BEFAST scale.

## 2018-01-04 NOTE — DISCHARGE SUMMARY
Discharge Summary       PATIENT ID: Beatriz Ball  MRN: [id number]   YOB: 1955    DATE OF ADMISSION: 1/3/2018 11:44 AM    DATE OF DISCHARGE: 1/4/2018   PRIMARY CARE PROVIDER: Leopoldo Peto, MD     ATTENDING PHYSICIAN: Dr. Paulina Wilkinson  DISCHARGING PROVIDER: Sarwat Cortez NP    To contact this individual call [phone] and ask the  to page. If unavailable ask to be transferred the Adult Hospitalist Department. CONSULTATIONS: IP CONSULT TO HOSPITALIST  IP CONSULT TO NEUROLOGY    PROCEDURES/SURGERIES: * No surgery found *    ADMITTING [address] [place] [address] COURSE:   CHIEF COMPLAINT:  Left-sided weakness.     HISTORY OF PRESENT ILLNESS:  The patient is a 62-year-old female with past medical history of anxiety disorder who presented with chief complaint as above. Patient states that she had gotten to work earlier this morning. Sometime around 8:30 a.m., she started feeling unusual.  She felt generally unwell and therefore asked her boss if she could leave work. Her boss obliged and also took her to the [facility] for evaluation. At [facility] she was assessed and during physical exam she was noted to have some weakness in the upper extremity and therefore arrangement was made to bring her to the emergency room for evaluation. In the ED, she was seen as a code stroke; however, all of the evaluation including imaging was negative. Patient denies any dizziness at this time, but she does have a headache. Denies fever or chills. Denies vomiting, had some nausea. Denies any cough, chest pain or shortness of breath. 1/4/2018  Stroke RULED OUT  All brain imaging was negative for stroke, MRA negative for aneurysm, normal echo, she has no manageable risk factors such as htn, afib, high cholesterol, smoking diabetes or obesity. She has been evaluated by neurology. Her headache has resolved with IV magnesium. She does carry three different jobs and is under a lot of stress.  Yesterday the patient started with a posterior headache which radiated bilaterally to the temporal areas, described as a squeezing sensation, with associated nausea, lightheadedness, no real photophobia, + imbalnce. [facility] and fell during Romberg exam.  She has been evaluated by PT with no recommendations. VSS on discharge      DISCHARGE DIAGNOSES / PLAN:      ASSESSMENT AND PLAN:  Patient is a 72-year-old female with past history as above, presents here with chief complaint as noted.      IMPRESSION:  1. Possible stroke. Patient is being admitted to neuro telemetry floor. 2.  Stroke. The patient's initial workup was essentially negative. We will have further evaluation with an MRI/MRA of the head and neck, also 2D echo will be ordered. Patient consultation on aspirin as well as a statin. I have consulted neurology to follow up on this and we will follow up with their recommendations as well. Patient will also have a  physical therapy evaluation. 3.  Home medications were continued. PENDING TEST RESULTS:   At the time of discharge the following test results are still pending: none    FOLLOW UP APPOINTMENTS:    Follow-up Information     Follow up With Details Comments Contact Jaskaran Concepcion MD  follow up post hospitalization [address] [place] [address]  [phone]             ADDITIONAL CARE RECOMMENDATIONS:   All your imaging has been negative for stroke. Your MRA did NOT show any aneurysm  Normal echo  It appears you had a vertiginous migraine. Please see below for more information    DIET: Regular    ACTIVITY: as previously    WOUND CARE: none    EQUIPMENT needed: none      DISCHARGE MEDICATIONS:  Current Discharge Medication List      CONTINUE these medications which have NOT CHANGED    Details   venlafaxine-SR (EFFEXOR XR) 37.5 mg capsule Take 37.5 mg by mouth daily.       levothyroxine (SYNTHROID) 25 mcg tablet Take 25 mcg by mouth Daily (before breakfast). acetaminophen (TYLENOL) 500 mg tablet Take 500 mg by mouth every six (6) hours as needed for Pain. STOP taking these medications       LORazepam (ATIVAN) 0.5 mg tablet Comments:   Reason for Stopping:                 NOTIFY YOUR PHYSICIAN FOR ANY OF THE FOLLOWING:   Fever over 101 degrees for 24 hours. Chest pain, shortness of breath, fever, chills, nausea, vomiting, diarrhea, change in mentation, falling, weakness, bleeding. Severe pain or pain not relieved by medications. Or, any other signs or symptoms that you may have questions about. DISPOSITION:  xx  Home With:   OT  PT  HH  RN       Long term SNF/Inpatient Rehab    Independent/assisted living    Hospice    Other:       PATIENT CONDITION AT DISCHARGE:     Functional status    Poor     Deconditioned    xx Independent      Cognition    xx Lucid     Forgetful     Dementia      Catheters/lines (plus indication)    Sigala     PICC     PEG    xx None      Code status    xx Full code     DNR      PHYSICAL EXAMINATION AT DISCHARGE:  Constitutional:  No acute distress, cooperative, pleasant    ENT:  Oral mucous moist, oropharynx benign. Neck supple,    Resp:  CTA bilaterally. No wheezing/rhonchi/rales. No accessory muscle use   CV:  Regular rhythm, normal rate, no murmurs, gallops, rubs    GI:  Soft, non distended, non tender. normoactive bowel sounds, no hepatosplenomegaly     Musculoskeletal:  No edema, warm, 2+ pulses throughout    Neurologic:  Moves all extremities. AAOx3, CN II-XII reviewed. FTN intact. + Left arm drift, + romberg test. Cannot appreciate weakness on the left side, all extremities with equal power. Psych:  Good insight, Not anxious nor agitated.                               Skin:  Good turgor, no rashes or ulcers        CHRONIC MEDICAL DIAGNOSES:  Problem List as of 1/4/2018  Date Reviewed: 1/4/2018          Codes Class Noted - Resolved    RESOLVED: Vertigo ICD-10-CM: R42  ICD-9-CM: 780.4  1/4/2018 - 1/4/2018        RESOLVED: Pulsatile tinnitus of both ears ICD-10-CM: H93. A3  ICD-9-CM: 388.30  1/4/2018 - 1/4/2018        * (Principal)RESOLVED: Left-sided weakness ICD-10-CM: R53.1  ICD-9-CM: 728.87  1/3/2018 - 1/4/2018              Greater than 30 minutes were spent with the patient on counseling and coordination of care    Signed:   Jesusita Felty, NP  1/4/2018  1:03 PM

## 2018-01-04 NOTE — PROGRESS NOTES
Full note to follow    All brain imaging is negative for stroke, she has no manageable risk factors such as htn, afib, high cholesterol, smoking diabetes or obesity. She has been evaluated by neurology. Pending echo results and MRA brain which she will go down for after 1 pm.  If this is negative she can likely be d/c home. She is currently receiving Magnesium IV for possible vertiginous migraine. Constitutional:  No acute distress, cooperative, pleasant    ENT:  Oral mucous moist, oropharynx benign. Neck supple,    Resp:  CTA bilaterally. No wheezing/rhonchi/rales. No accessory muscle use   CV:  Regular rhythm, normal rate, no murmurs, gallops, rubs    GI:  Soft, non distended, non tender. normoactive bowel sounds, no hepatosplenomegaly     Musculoskeletal:  No edema, warm, 2+ pulses throughout    Neurologic:  Moves all extremities. AAOx3, CN II-XII reviewed. FTN intact. + Left arm drift, + romberg test. Cannot appreciate weakness on the left side, all extremities with equal power. Psych:  Good insight, Not anxious nor agitated.                               Skin:  Good turgor, no rashes or ulcers        [employer] Minor, NP

## 2018-01-04 NOTE — PROGRESS NOTES
Stroke Education documented in Patient Education: YES  Core Measures Documented in Connect [facility]:  Risk Factors: YES  Warning signs of stroke: YES  When to Activate 911: YES  Medication Education for Risk Factors: YES  Smoking cessation if applicable: N/A  Written Education Given:  YES    Discharge NIH Completed: YES  Score: 0    BRAINS: YES    Follow Up Appointment Made: NO  Date/Time if applicable: Franco Thomas RN performed patient education and medication education. Discharge concerns initiated and discussed with patient, including clarification on \"who\" assists the patient at their home and instructions for when the home going patient should call their provider after discharge. Opportunity for questions and clarification was provided. Patient receptive to education: YES  Patient stated: I understand the s/s of a stroke  Barriers to Education: None  Diagnosis Education given:  YES    Length of stay: 1  Expected Day of Discharge: 1/4/2018  Ask if they have \"Help at Home\" & add to white board?   YES    Education Day #: 2    Medication Education Given:  YES  M in the box Medication name: Aspirin    Pt aware of HCAHPS survey: YES

## 2018-01-04 NOTE — PROGRESS NOTES
Bedside and Verbal shift change report given to ELIS Loza (oncoming nurse) by Denzel Medel (offgoing nurse). Report included the following information SBAR, Kardex, ED Summary, Procedure Summary, Intake/Output, MAR, Recent Results and Cardiac Rhythm NSR.

## 2018-01-04 NOTE — INTERDISCIPLINARY ROUNDS
IDR/SLIDR Summary          Patient: Janice Mendoza MRN: [id number]    Age: 65 y.o. YOB: 1955 Room/Bed: [facility]   Admit Diagnosis: Acute ischemic stroke [facility])  Principal Diagnosis: Acute ischemic stroke ([facility] [address]   Goals: Increase Mobilty, Decrease dizziness  Readmission: NO  Quality Measure: Not applicable  VTE Prophylaxis: Chemical  Influenza Vaccine screening completed? YES  Pneumococcal Vaccine screening completed? NO  Mobility needs: Yes   Nutrition plan:No  Consults: P. T    Financial concerns:No  Escalated to CM? NO  RRAT Score: 3   Interventions:Home Health  Testing due for pt today?  YES  LOS: 1 days Expected length of stay 2 days  Discharge plan: tbd   PCP: Trent Silva MD  Transportation needs: No    Days before discharge:one day until discharge   Discharge disposition: Home    Signed:     Edward Velasquez  1/4/2018  8:30 AM

## 2018-01-04 NOTE — CONSULTS
NEUROLOGY  1/4/2018     Consulted by: Cholo Bonilla MD        Patient ID:  Jurgen Wise  [id number]  65 y.o.  1955    Chief Complaint   Patient presents with    Dizziness    Headache    Numbness       HPI    Jurgen Wise is a 71-year-old woman with a history of hypothyroidism and anxiety who presents with various new symptoms that occurred yesterday. She tells me yesterday she began to have subacute progressive headache, nausea, lightheadedness. Headache began posterior region radiating bilaterally to the bitemporal.  It was a squeeze nonfluctuating. May be some mild photophobia. She then began to feel imbalance. She went to patient [facility] and was evaluated. Apparently on examination during Romberg testing she fell. There was concern for left-sided weakness. She was referred to the emergency room. Today she feels mostly her baseline with the exception of a still mild lingering headache. She has a history of headaches when she was younger but this is different. Blood pressures also been elevated in recent days. She has high stress jobs working in administration. Less sleep lately. MRI brain was completed and it is completely normal.  MRA of the neck is normal.      Review of Systems   Eyes: Negative for double vision. Musculoskeletal: Positive for falls. Neurological: Positive for dizziness, focal weakness and headaches. Negative for speech change. All other systems reviewed and are negative. Past Medical History:   Diagnosis Date    Anxiety     Hypotension     Thyroid disease      History reviewed. No pertinent family history. Social History     Social History    Marital status:      Spouse name: N/A    Number of children: N/A    Years of education: N/A     Occupational History    Not on file.      Social History Main Topics    Smoking status: Not on file    Smokeless tobacco: Not on file    Alcohol use Not on file    Drug use: Not on file    Sexual activity: Not on file     Other Topics Concern    Not on file     Social History Narrative    No narrative on file     Current Facility-Administered Medications   Medication Dose Route Frequency    saline peripheral flush soln 30 mL  30 mL InterCATHeter RAD ONCE    sodium chloride (NS) 0.9 % flush        levothyroxine (SYNTHROID) tablet 25 mcg  25 mcg Oral ACB    venlafaxine-SR (EFFEXOR-XR) capsule 37.5 mg  37.5 mg Oral DAILY    magnesium sulfate 2 g/50 ml IVPB (premix or compounded)  2 g IntraVENous ONCE    acetaminophen (TYLENOL) tablet 650 mg  650 mg Oral Q4H PRN    Or    acetaminophen (TYLENOL) solution 650 mg  650 mg Per NG tube Q4H PRN    Or    acetaminophen (TYLENOL) suppository 650 mg  650 mg Rectal Q4H PRN    0.9% sodium chloride infusion  75 mL/hr IntraVENous CONTINUOUS    aspirin chewable tablet 81 mg  81 mg Oral DAILY    pravastatin (PRAVACHOL) tablet 40 mg  40 mg Oral QHS    enoxaparin (LOVENOX) injection 40 mg  40 mg SubCUTAneous Q24H     Allergies   Allergen Reactions    Sulfa (Sulfonamide Antibiotics) Diarrhea and Nausea and Vomiting       Visit Vitals    BP 98/74 (BP 1 Location: Left arm, BP Patient Position: At rest)    Pulse 69    Temp 98.1 °F (36.7 °C)    Resp 12    Ht 5' 2\" (1.575 m)    Wt 63.5 kg (140 lb)    SpO2 98%    BMI 25.61 kg/m2     Physical Exam   Constitutional: She is oriented to person, place, and time. She appears well-developed and well-nourished. Cardiovascular: Normal rate. Pulmonary/Chest: Effort normal.   Neurological: She is oriented to person, place, and time. She has an abnormal Romberg Test. She has a normal Finger-Nose-Finger Test. Gait normal.   Reflex Scores:       Tricep reflexes are 2+ on the right side and 2+ on the left side. Bicep reflexes are 2+ on the right side and 2+ on the left side. Brachioradialis reflexes are 2+ on the right side and 2+ on the left side. Patellar reflexes are 2+ on the right side and 2+ on the left side. Achilles reflexes are 2+ on the right side and 2+ on the left side. Skin: Skin is warm and dry. Psychiatric: She has a normal mood and affect. Her behavior is normal.   Vitals reviewed. Neurologic Exam     Mental Status   Oriented to person, place, and time. Cranial Nerves   Cranial nerves II through XII intact. Motor Exam        Right side intact. Left side with some give way weakness upper and lower extremity. On drift testing the left arm drifts down but does not pronate. Sensory Exam   Light touch normal.     Gait, Coordination, and Reflexes     Gait  Gait: normal    Coordination   Romberg: positive  Finger to nose coordination: normal    Tremor   Resting tremor: absent    Reflexes   Right brachioradialis: 2+  Left brachioradialis: 2+  Right biceps: 2+  Left biceps: 2+  Right triceps: 2+  Left triceps: 2+  Right patellar: 2+  Left patellar: 2+  Right achilles: 2+  Left achilles: 2+           Lab Results  Component Value Date/Time   WBC 10.6 01/03/2018 11:59 AM   HGB 12.8 01/03/2018 11:59 AM   HCT 40.6 01/03/2018 11:59 AM   PLATELET 484 03/96/8990 11:59 AM   MCV 81.7 01/03/2018 11:59 AM     Lab Results  Component Value Date/Time   Hemoglobin A1c 5.6 01/04/2018 07:23 AM   Glucose 96 01/03/2018 11:59 AM   Glucose (POC) 91 01/03/2018 11:37 AM   LDL, calculated 119.2 01/04/2018 07:23 AM   Creatinine 0.80 01/03/2018 11:59 AM      Lab Results  Component Value Date/Time   Cholesterol, total 198 01/04/2018 07:23 AM   HDL Cholesterol 43 01/04/2018 07:23 AM   LDL, calculated 119.2 01/04/2018 07:23 AM   Triglyceride 179 01/04/2018 07:23 AM   CHOL/HDL Ratio 4.6 01/04/2018 07:23 AM     Lab Results  Component Value Date/Time   ALT (SGPT) 34 01/03/2018 11:59 AM   AST (SGOT) 18 01/03/2018 11:59 AM   Alk.  phosphatase 99 01/03/2018 11:59 AM   Bilirubin, total 0.3 01/03/2018 11:59 AM   Albumin 3.9 01/03/2018 11:59 AM   Protein, total 7.6 01/03/2018 11:59 AM   INR (POC) 1.1 01/03/2018 11:39 AM   PLATELET 347 01/03/2018 11:59 AM          CT Results (maximum last 3): Results from [place] encounter on 01/03/18   CT CODE NEURO HEAD WO CONTRAST   Narrative INDICATION:   Headache, left-sided numbness, ataxia    COMPARISON: None. CONTRAST:  None. TECHNIQUE: Unenhanced CT of the head was performed using 5 mm images. Brain and  bone windows were generated. CT dose reduction was achieved through use of a  standardized protocol tailored for this examination and automatic exposure  control for dose modulation. FINDINGS:  The ventricles and sulci are normal in size, shape and configuration and  midline. There is no significant white matter disease. There is no intracranial  hemorrhage, extra-axial collection, mass, mass effect or midline shift. The  basilar cisterns are open. No acute infarct is identified. The bone windows  demonstrate no abnormalities. The visualized portions of the paranasal sinuses  and mastoid air cells are clear. Impression IMPRESSION: No acute abnormality. MRI Results (maximum last 3): Results from [place] encounter on 01/03/18   MRA NECK W CONT   Narrative INDICATION: Stroke. Dizziness, headache, numbness. Multiplanar MRA of the neck is performed with 2D time of flight sequences. Postcontrast images were obtained after the intravenous administration of 13 mL  of MultiHance. Common carotid arteries and internal carotid arteries are patent and of normal  course and caliber. There is no significant stenosis. There is no evidence of  dissection. Vertebral arteries are patent and of normal course and caliber bilaterally. There is a dominant left vertebral artery. Impression IMPRESSION: Normal MRA of the neck. MRI BRAIN W WO CONT   Narrative INDICATION: Dizziness, headache, numbness. Exam: Multiplanar pre- and postgadolinium MRI of the brain is performed with T1,  T2, gradient, FLAIR and diffusion sequences.  A total of 13 mL of MultiHance  contrast was administered intravenously. Direct comparison is made to prior CT dated January 3, 2018. FINDINGS: There is no restricted diffusion to suggest acute infarct. The  ventricular system is normal. The gray-white matter differentiation is  well-preserved. The cervicomedullary junction is normal and there is no  tonsillar ectopia. There is no acute intracranial hemorrhage, prior intracranial  hemorrhage or extra-axial fluid collection. The visualized vascular flow-voids  of the skull base are normal. There is no enhancing intracranial mass lesion,  mass effect or herniation. Impression IMPRESSION: No acute intracranial hemorrhage, enhancing mass or infarct. VAS/US/Carotid Doppler Results (maximum last 3): No results found for this or any previous visit. PET Results (maximum last 3): No results found for this or any previous visit. Assessment and Plan        67-year-old woman with minimal stroke risk factors who presented with progressive symptoms suspicious for possible migrainous etiology. MRI brain is completely benign without evidence of stroke or demyelination or mass lesion. MRA of the head was not done. Given that she is complaining of pulsatile tinnitus I do think it is reasonable to obtain intracranial vascular imaging. I have placed the order. I am also going to give her a dose of magnesium IV for the lingering headache. Exam does show some give way weakness of the left side. No clear peripheral vertiginous signs on exam.  I do not think she needs to be on aspirin. I do not think this was a TIA or stroke. Will follow-up once the MRI is done. During this evaluation, we also discussed stroke education to include signs and symptoms of stroke and TIA.        [address] [place] [address],   NEUROLOGIST  Diplomate KAIN  1/4/2018

## 2018-01-04 NOTE — PROGRESS NOTES
Consult received and appreciated. Reviewed chart and discussed case with nsg. Nsg dysphagia screen completed and WNL and patient is tolerating a diet without difficulty. MRI negative for acute infarct and no concerns regarding speech or swallowing function. Formal SLP evaluation is not clinically indicated at this time. Please re-consult if further needs arise. Thanks. Yasmani Mayfield M.CD.  CCC-SLP

## 2022-12-06 ENCOUNTER — TELEPHONE (OUTPATIENT)
Dept: PALLATIVE CARE | Age: 67
End: 2022-12-06

## 2022-12-06 DIAGNOSIS — G89.3 CANCER RELATED PAIN: ICD-10-CM

## 2022-12-06 DIAGNOSIS — C56.9 MALIGNANT NEOPLASM OF OVARY, UNSPECIFIED LATERALITY (HCC): Primary | ICD-10-CM

## 2022-12-07 NOTE — TELEPHONE ENCOUNTER
[facility] Palliative Medicine Office  Nursing Note  [phone]  Fax [phone]      Name:  Huma Solis  YOB: 1955    Received outpatient Palliative Medicine referral from Dr. Rossy Mcclain to see patient for symptom management and supportive care. Chart  reviewed. Huma Solis is a 78 y.o. female with stage IV ovarian cancer. Patient's most recent office visit with Dr. Caleb Valverde was on 12/5/22. See his note scanned into Media for complete HPI, treatment history, and plan. Patient has decided to stop treatment. ACP:    No ACP documents on file    Nurse called patient and explained [address] outpatient Palliative Medicine services. Appointment scheduled for 12/29/22 at 2:30pm with Dr. Paul Han. We will call patient if we have a cancellation prior to 12/29/22.     CHAU YañezN, RN-BC, [facility]

## 2022-12-08 ENCOUNTER — TELEPHONE (OUTPATIENT)
Dept: PALLATIVE CARE | Age: 67
End: 2022-12-08

## 2022-12-08 ENCOUNTER — OFFICE VISIT (OUTPATIENT)
Dept: PALLATIVE CARE | Age: 67
End: 2022-12-08
Payer: MEDICARE

## 2022-12-08 VITALS
RESPIRATION RATE: 18 BRPM | HEART RATE: 100 BPM | WEIGHT: 147 LBS | OXYGEN SATURATION: 97 % | BODY MASS INDEX: 27.05 KG/M2 | TEMPERATURE: 98.6 F | DIASTOLIC BLOOD PRESSURE: 75 MMHG | HEIGHT: 62 IN | SYSTOLIC BLOOD PRESSURE: 122 MMHG

## 2022-12-08 DIAGNOSIS — C56.1 MALIGNANT NEOPLASM OF RIGHT OVARY (HCC): ICD-10-CM

## 2022-12-08 DIAGNOSIS — C56.1 MALIGNANT NEOPLASM OF RIGHT OVARY (HCC): Primary | ICD-10-CM

## 2022-12-08 DIAGNOSIS — R53.0 NEOPLASTIC MALIGNANT RELATED FATIGUE: Primary | ICD-10-CM

## 2022-12-08 DIAGNOSIS — R11.0 NAUSEA: ICD-10-CM

## 2022-12-08 DIAGNOSIS — Z71.89 GOALS OF CARE, COUNSELING/DISCUSSION: ICD-10-CM

## 2022-12-08 DIAGNOSIS — G89.3 CANCER RELATED PAIN: ICD-10-CM

## 2022-12-08 PROCEDURE — 99205 OFFICE O/P NEW HI 60 MIN: CPT | Performed by: INTERNAL MEDICINE

## 2022-12-08 PROCEDURE — G8419 CALC BMI OUT NRM PARAM NOF/U: HCPCS | Performed by: INTERNAL MEDICINE

## 2022-12-08 PROCEDURE — 1101F PT FALLS ASSESS-DOCD LE1/YR: CPT | Performed by: INTERNAL MEDICINE

## 2022-12-08 PROCEDURE — G8400 PT W/DXA NO RESULTS DOC: HCPCS | Performed by: INTERNAL MEDICINE

## 2022-12-08 PROCEDURE — G8427 DOCREV CUR MEDS BY ELIG CLIN: HCPCS | Performed by: INTERNAL MEDICINE

## 2022-12-08 PROCEDURE — 99497 ADVNCD CARE PLAN 30 MIN: CPT | Performed by: INTERNAL MEDICINE

## 2022-12-08 PROCEDURE — G8510 SCR DEP NEG, NO PLAN REQD: HCPCS | Performed by: INTERNAL MEDICINE

## 2022-12-08 PROCEDURE — G8536 NO DOC ELDER MAL SCRN: HCPCS | Performed by: INTERNAL MEDICINE

## 2022-12-08 PROCEDURE — 3017F COLORECTAL CA SCREEN DOC REV: CPT | Performed by: INTERNAL MEDICINE

## 2022-12-08 PROCEDURE — 1090F PRES/ABSN URINE INCON ASSESS: CPT | Performed by: INTERNAL MEDICINE

## 2022-12-08 RX ORDER — METHYLPHENIDATE HYDROCHLORIDE 5 MG/1
5 TABLET ORAL 2 TIMES DAILY
Qty: 60 TABLET | Refills: 0 | Status: SHIPPED | OUTPATIENT
Start: 2022-12-08

## 2022-12-08 RX ORDER — PHENOL/SODIUM PHENOLATE
20 AEROSOL, SPRAY (ML) MUCOUS MEMBRANE DAILY
COMMUNITY

## 2022-12-08 NOTE — PROGRESS NOTES
Palliative Medicine Office Visit  Palliative Medicine Nurse Check In  [phone]    Patient Name: Rosa Meza  YOB: 1955      Date of Office Visit: 12/8/2022    Patient states: \"  \"    From Check In Sheet (scanned in Media):  Has a medical provider talked with you about cardiopulmonary resuscitation (CPR)? [x] Yes   [] No   [] Unable to obtain    Nurse reminder to complete or update ACP FlowSheet:    Is ACP on the Problem List?    [] Yes    [x] No  IF ACP Document is ON FILE; Nurse to place ACP on Problem List     Is there an ACP Note in Chart Review/Note? [] Yes    [x] No   If NO: ALERT PROVIDER       Primary Decision Maker: Kathleenamanda Lucian - [place] - [phone]    Secondary Decision Maker: Urban Harper - [phone]  Advance Care Planning 12/8/2022   Patient's Healthcare Decision Maker is: Legal Next of Kin   Confirm Advance Directive Yes, not on file   Does the patient have other document types Power of [place]; Do Not Resuscitate        Is there anything that we should know about you as a person in order to provide you the best care possible? Functional status (describe):         Last BM: 12/7/2022     accessed (date):      Bottle review (for opioid pain medication):  Medication 1:   Date filled:   Directions:   # filled:   # left:   # pills taking per day:  Last dose taken:    Medication 2:   Date filled:   Directions:   # filled:   # left:   # pills taking per day:  Last dose taken:    Medication 3:   Date filled:   Directions:   # filled:   # left:   # pills taking per day:  Last dose taken:    Medication 4:   Date filled:   Directions:   # filled:   # left:   # pills taking per day:  Last dose taken:

## 2022-12-08 NOTE — PATIENT INSTRUCTIONS
Dear Kristi Delgado ,    It was a pleasure seeing you today at our [facility] office. If labs or imaging tests have been ordered for you today, please call the office  at [phone] 48 hours after completion to obtain the results. Your stated goal: To be independent and live out the remainder of your time on your own terms. Your described symptoms were: Fatigue: 10 Drowsiness: 8   Depression: 2 (Flucuates) Pain: 4   Anxiety: 0 Nausea: 3   Anorexia: 10 Dyspnea: 5   Best Well-Bein Constipation: No   Other Problem (Comment):  (Headaches, frequent lightheadedness, occasionally loses thought, and bilateral calf pain (mostly left calf))       This is the plan we talked about:  1. Cancer related pain    -Tylenol 500 mg stopped working over the summer  -You have since been using 3 different formulations of CBD/THC tinctures to manage pain. -These have all been helpful. You utilize the differrent ratios depending on time of day and needs (appetite, pain, sleep). -You have not tolerated opioids in the past as you immediately get nauseated and often vomit. This includes Tramadol. You have a low tolerance for these symptoms and prefer to avoid taking as long as possible. 2. Nausea, anorexia and early satiety.    -You are taking Zofran as needed, this week a couple of times per day. -No weight loss thus far. -You have to remind herself to eat. You do drinks protein shakes when you remember.    -You do have vomiting about once a month when adhesions interfere with bowel movements  -You are moving your bowels 1-2 times per day. You previously took dulcolax but haven't needed it lately. - We talked about a trial of mirtazapine today considering it's potential effect on appetite, but opted instead to focus on fatigue as below. 3.  Cancer related fatigue:  -This is your most pressing and bothersome symptom  -You report the fatigue as an issue well before you began CBD/THC.   -I recommend a trial of a psychostimulant, start with Ritalin 5 mg before breakfast and at lunchtime. Prescription for 30 day supply today.    -Do not take in the afternoon or evening as it could interfere with sleep. 4.  Stage IV ovarian cancer  -You have followed with Dr. Pavel Garcia. After a year and a half disease free interval, you were found to have a recurrence in April of 2022. -After having an allergic reaction to your 4th cycle of carboplatinum in September, you have elected not to continue any chemotherapy. 5.  Your goals  -You are at peace with your prognosis and understand that you likely have 6 months or less if your cancer runs its natural course.  -You are clear that you do not want to be hospitalized or treated for any acute condition. You prefer all of your care to be focused on symptom management and a peaceful end of life experience. -You have moved in with your close friend who hopes to assist in your care and support you as you decline, along with your daughter.    -To support your goals I made a referral today to Jose Boateng not on file here, you report you do have an advance medical directive and a DDNR at home. This is what you have shared with us about Advance Care Planning:      Primary Decision Maker: Sarah Olivier - Daughter - [phone]    Secondary Decision Maker: Chino Wilder - [address] [place] [address] 12/8/2022   Patient's [address] is: Legal Next of [facility] [address] Directive Yes, not on file   Does the patient have other document types Power of [place]ad; Do Not Resuscitate           The Palliative Medicine Team is here to support you and your family.        Sincerely,      Gregorio Rogers MD and the Palliative Medicine Team

## 2022-12-08 NOTE — PROGRESS NOTES
Palliative Medicine Outpatient Services  Shayy: [phone] ([phone])    Patient Name: Emily Manzanares  YOB: 1955    Date of Current Visit: 12/08/22  Location of Current Visit:    [x] [facility] Office  [] [facility] Office  [] [facility] Office  [] Home  []Synchronous real-time A/V virtual visit    Date of Initial Visit: 12/8/22   Referral from: Dr. Feliciano Ramos (gynecologic oncology)  Primary Care Physician: Edgar Kidd MD      SUMMARY:   Emily Manzanares is a 78y.o. year old woman who was diagnosed with stage IV ovarian cancer in April of 2020 under the care of Dr. Feliciano Ramos. She had complete response by July 2020 after chemotherapy. Her care was complicated by vaginal cuff dehiscence from Bevasizumab. She declined maintenance chemotherapy considering the side effects she experienced from initial course. She was found to have disease recurrence in April of 2022. She agreed to restart platinum only chemotherapy. After tolerating three cycles, she had an allergic reaction to the 4th which required hospitalization. She has since made the decision not to pursue any further cancer directed therapy. She was referred to Palliative Medicine by Dr. Feliciano Ramos for end of life care discussions and support for cancer related symptoms. Palliative Medicine is addressing the following current patient/family concerns: fatigue, pain, anorexia and care decisions. 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 , one daughter Elly Guerra, recently one granddaughter born in Spring 2022 Alba Kan). Has been living out of an  since May 2019. She moved in with her friend Chloe Zambrano. Office notes from Dr. Feliciano Ramos reviewed in detail. PALLIATIVE DIAGNOSES:       ICD-10-CM ICD-9-CM    1. Neoplastic malignant related fatigue  R53.0 780.79 methylphenidate HCl (RITALIN) 5 mg tablet      2.  Malignant neoplasm of right ovary (HCC)  C56.1 183.0       3. Cancer related pain  G89.3 338.3       4. Nausea  R11.0 787.02       5. Goals of care, counseling/discussion  Z71.89 V65.49 DO NOT RESUSCITATE             PLAN:     Patient Instructions   Dear Gayla Goodman ,    It was a pleasure seeing you today at our [facility] office. If labs or imaging tests have been ordered for you today, please call the office  at [phone] 48 hours after completion to obtain the results. Your stated goal: To be independent and live out the remainder of your time on your own terms. Your described symptoms were: Fatigue: 10 Drowsiness: 8   Depression: 2 (Flucuates) Pain: 4   Anxiety: 0 Nausea: 3   Anorexia: 10 Dyspnea: 5   Best Well-Bein Constipation: No   Other Problem (Comment):  (Headaches, frequent lightheadedness, occasionally loses thought, and bilateral calf pain (mostly left calf))       This is the plan we talked about:  1. Cancer related pain    -Tylenol 500 mg stopped working over the summer  -You have since been using 3 different formulations of CBD/THC tinctures to manage pain. -These have all been helpful. You utilize the differrent ratios depending on time of day and needs (appetite, pain, sleep). -You have not tolerated opioids in the past as you immediately get nauseated and often vomit. This includes Tramadol. You have a low tolerance for these symptoms and prefer to avoid taking as long as possible. 2. Nausea, anorexia and early satiety.    -You are taking Zofran as needed, this week a couple of times per day. -No weight loss thus far. -You have to remind herself to eat. You do drinks protein shakes when you remember.    -You do have vomiting about once a month when adhesions interfere with bowel movements  -You are moving your bowels 1-2 times per day. You previously took dulcolax but haven't needed it lately.     - We talked about a trial of mirtazapine today considering it's potential effect on appetite, but opted instead to focus on fatigue as below. 3.  Cancer related fatigue:  -This is your most pressing and bothersome symptom  -You report the fatigue as an issue well before you began CBD/THC. -I recommend a trial of a psychostimulant, start with Ritalin 5 mg before breakfast and at lunchtime.   -Do not take in the afternoon or evening as it could interfere with sleep. 4.  Stage IV ovarian cancer  -You have followed with Dr. Teja Cornejo. After a year and a half disease free interval, you were found to have a recurrence in April of 2022. -After having an allergic reaction to your 4th cycle of carboplatinum in September, you have elected not to continue any chemotherapy. 5.  Your goals  -You are at peace with your prognosis and understand that you likely have 6 months or less if your cancer runs its natural course.  -You are clear that you do not want to be hospitalized or treated for any acute condition. You prefer all of your care to be focused on symptom management and a peaceful end of life experience. -You have moved in with your close friend who hopes to assist in your care and support you as you decline, along with your daughter.    -To support your goals I made a referral today to Jose Boateng not on file here, you report you do have an advance medical directive and a DDNR at home. This is what you have shared with us about Advance Care Planning:      Primary Decision Maker: Sofiya Cardona - Daughter - [phone]    Secondary Decision Maker: Zurdo Abdul - [facility] 12/8/2022   Patient's [address] is: Legal Next of [facility] [address] Directive Yes, not on file   Does the patient have other document types Power of [place]; Do Not Resuscitate           The Palliative Medicine Team is here to support you and your family.        Sincerely,      Curly Fairchild MD and the Palliative Medicine Team     GOALS OF CARE / TREATMENT PREFERENCES:     GOALS OF CARE:  Patient / health care proxy stated goals: See Patient Instructions / Summary    TREATMENT PREFERENCES:   Code Status:  [] Attempt Resuscitation       [x] Do Not Attempt Resuscitation    Advance Care Planning:  [x] [facility] Interdisciplinary Team has updated the ACP Navigator with Decision Maker and Patient Capacity      Primary Decision Maker: Dandre Monique - [phone]    Secondary Decision Maker: Hermelinda Antoine - [phone]  Advance Care Planning 12/8/2022   Patient's Healthcare Decision Maker is: Legal Next jamarcus Jimenez 296 Directive Yes, not on file   Does the patient have other document types Power of [place]; Do Not Resuscitate        PRESCRIPTIONS GIVEN:     Medications Ordered Today   Medications    methylphenidate HCl (RITALIN) 5 mg tablet     Sig: Take 1 Tablet by mouth two (2) times a day. Max Daily Amount: 10 mg. Dispense:  60 Tablet     Refill:  0           FOLLOW UP:     Future Appointments   Date Time Provider Krysta Nava   12/29/2022  2:30 PM Galindo Garcia MD [facility]           PHYSICIANS INVOLVED IN CARE:   Patient Care Team:  Edgar Kidd MD as PCP - General (Internal Medicine Physician)       HISTORY:   Reviewed patient-completed ESAS and advance care planning form. Reviewed patient record in prescription monitoring program.    CHIEF COMPLAINT:   Chief Complaint   Patient presents with    New Patient       HPI/SUBJECTIVE:    The patient is: [x] Verbal / [] Nonverbal     Ms. Diya Bustamante is here today to establish following her decision to stop cancer directed therapy for stage IV ovarian cancer. She shares she values her independence. After April recurrence opted for platinum only treatment which started June 2022. During 3rd round of platinum therapy in September had anaphylaxis and was rushed to [place]. She was offered to restart Bevasizumab, but she declined considering prior history with this drug. Ascites drained once Jan 2020. Right pleural effusion drained twice, both remotely. She wants to live well and spend time with family and friends. She receives cannabis products from a friend since the summer and takes to assist with pain, nausea and appetite. She has found benefit. Cannabis drops (18:1 CBD to THC ratio) - uses 2 drops, 2-3 times per day. Cannabis tincutre (1:3 CBD to THC ratio) - uses a dropper full at night. Cannabis infused fruit chews (100mg-100mg) uses 1/4 chew twice a day. She has had significant nausea with opioids in the past.  Had instantaneous vomiting with Dilaudid. For nausea in addition to cannabis has used mary tea and chamomile. Mood is good. She denies depression or anxiety. Feels at peace with her prognosis and decisions she has made. Clinical Pain Assessment (nonverbal scale for nonverbal patients):   [++++ Clinical Pain Assessment++++]  [++++Pain Severity++++]: Pain: 4  [++++Pain Character++++]:  [++++Pain Duration++++]:  [++++Pain Effect++++]:  [++++Pain Factors++++]:  [++++Pain Frequency++++]:  [++++Pain Location++++]:  [++++ Clinical Pain Assessment++++]       FUNCTIONAL ASSESSMENT:     Palliative Performance Scale (PPS):  PPS: 70       PSYCHOSOCIAL/SPIRITUAL SCREENING:     Any spiritual / Zoroastrian concerns:  [] Yes /  [x] No    Caregiver Burnout:  [] Yes /  [x] No /  [] No Caregiver Present      Anticipatory grief assessment:   [x] Normal  / [] Maladaptive       ESAS Anxiety: Anxiety: 0    ESAS Depression: Depression: 2 (Flucuates)       REVIEW OF SYSTEMS:     The following systems were [x] reviewed / [] unable to be reviewed  Systems: constitutional, ears/nose/mouth/throat, respiratory, gastrointestinal, genitourinary, musculoskeletal, integumentary, neurologic, psychiatric, endocrine. Positive findings noted below.   Modified ESAS Completed by: provider   Fatigue: 10 Drowsiness: 8   Depression: 2 (Flucuates) Pain: 4   Anxiety: 0 Nausea: 3   Anorexia: 10 Dyspnea: 5   Best Well-Bein Constipation: No   Other Problem (Comment):  (Headaches, frequent lightheadedness, occasionally loses thought, and bilateral calf pain (mostly left calf))          PHYSICAL EXAM:     Wt Readings from Last 3 Encounters:   22 147 lb (66.7 kg)   18 140 lb (63.5 kg)     Blood pressure 122/75, pulse 100, temperature 98.6 °F (37 °C), temperature source Oral, resp. rate 18, height 5' 2\" (1.575 m), weight 147 lb (66.7 kg), SpO2 97 %. Last bowel movement: See Nursing Note    Constitutional: generally well in appearance, appears well nourished, no acute distress  Eyes: anicteric sclerae  ENMT: no nasal discharge, moist mucous membranes  Cardiovascular: regular rhythm, distal pulses intact  Respiratory: breathing not labored, reduced bs at right base, no rhales  Gastrointestinal: soft non-tender, +bowel sounds  Musculoskeletal: no deformity, no tenderness to palpation  Skin: warm, dry  Neurologic: following commands, moving all extremities  Psychiatric: full affect, no hallucinations        HISTORY:     Past Medical History:   Diagnosis Date    Anxiety     Hypotension     Thyroid disease       No past surgical history on file. No family history on file. History reviewed, no pertinent family history. Social History     Tobacco Use    Smoking status: Not on file    Smokeless tobacco: Not on file   Substance Use Topics    Alcohol use: Not on file     Allergies   Allergen Reactions    Sulfa (Sulfonamide Antibiotics) Diarrhea and Nausea and Vomiting      Current Outpatient Medications   Medication Sig    Omeprazole delayed release (PRILOSEC D/R) 20 mg tablet Take 20 mg by mouth daily. OTHER Take 1 Tablet by mouth daily.  Corcidin D    OTHER CBD Products   Cannibus infused fruit chews blue raspberry 100 mg THC, 100 mg CBD  CBD drops 18:1 3 drops, 20 mg CBD 1 mg THC  Releaf Tincture 0.25 ml drop .862 mg and .322 mg    ondansetron HCl (ZOFRAN PO) Zofran    methylphenidate HCl (RITALIN) 5 mg tablet Take 1 Tablet by mouth two (2) times a day. Max Daily Amount: 10 mg.    levothyroxine (SYNTHROID) 25 mcg tablet Take 75 mcg by mouth Daily (before breakfast). venlafaxine-SR (EFFEXOR-XR) 37.5 mg capsule Take 75 mg by mouth daily. No current facility-administered medications for this visit. LAB and other DATA REVIEWED:     Lab Results   Component Value Date/Time    WBC 10.6 01/03/2018 11:59 AM    HGB 12.8 01/03/2018 11:59 AM    PLATELET 282 62/25/7618 11:59 AM     Lab Results   Component Value Date/Time    Sodium 140 01/03/2018 11:59 AM    Potassium 3.7 01/03/2018 11:59 AM    Chloride 105 01/03/2018 11:59 AM    CO2 28 01/03/2018 11:59 AM    BUN 15 01/03/2018 11:59 AM    Creatinine 0.80 01/03/2018 11:59 AM    Calcium 8.9 01/03/2018 11:59 AM      Lab Results   Component Value Date/Time    Alk. phosphatase 99 01/03/2018 11:59 AM    Protein, total 7.6 01/03/2018 11:59 AM    Albumin 3.9 01/03/2018 11:59 AM    Globulin 3.7 01/03/2018 11:59 AM     Lab Results   Component Value Date/Time    INR (POC) 1.1 01/03/2018 11:39 AM      No results found for: IRON, FE, TIBC, IBCT, PSAT, FERR     Detail chart reviewed. Other specialists and referral provider notes reviewed. CONTROLLED SUBSTANCES SAFETY ASSESSMENT (IF ON CONTROLLED SUBSTANCES):     Reviewed opioid safety handout:  [] Yes   [x] No  24 hour opioid dose >150mg morphine equivalent/day:  [] Yes   [x] No  Benzodiazepines:  [] Yes   [x] No  Sleep apnea:  [] Yes   [x] No  Urine Toxicology Testing within last 6 months:  [] Yes   [x] No  History of or new aberrant medication taking behaviors:  [] Yes   [x] No  Has Narcan been prescribed [x] Yes   [] No          Total time:   Counseling / coordination time:   > 50% counseling / coordination?:       --Tano Pearce MD on 12/8/2022 at 11:18 AM    An electronic signature was used to authenticate this note.

## 2022-12-09 NOTE — ACP (ADVANCE CARE PLANNING)
Advance Care Planning     Advance Care Planning (ACP) Physician/NP/PA Conversation      Date of Conversation: 12/8/2022  Conducted with: Patient with Decision Making Capacity    Healthcare Decision Maker:     Primary Decision Maker: Nile Norton - Daughter - [phone]    Secondary Decision Maker: Wali Hogue - Brother - [phone]  Click here to complete [address] including selection of the Healthcare Decision Maker Relationship (ie \"Primary\")    Today we documented Decision Maker(s). The patient will provide ACP documents. Care Preferences:    Hospitalization: \"If your health worsens and it becomes clear that your chance of recovery is unlikely, what would be your preference regarding hospitalization? \"  The patient would prefer comfort-focused treatment without hospitalization. Ventilation: \"If you were unable to breathe on your own and your chance of recovery was unlikely, what would be your preference about the use of a ventilator (breathing machine) if it was available to you? \"   The patient would NOT desire the use of a ventilator. Resuscitation: \"In the event your heart stopped as a result of an underlying serious health condition, would you want attempts to be made to restart your heart, or would you prefer a natural death? \"   No, do NOT attempt to resuscitate. Additional topics discussed: treatment goals, end of life care preferences (vegetative state/imminent death), and hospice care    Conversation Outcomes / Follow-Up Plan:   ACP complete - no further action today  Reviewed DNR/DNI and patient confirms current DNR status - completed forms on file (place new order if needed)     We reviewed her decision against further cancer directed therapy. In addition she shared her clear desire not to receive any future diagnostics or acute care. I offered ongoing follow up in palliative medicine clinic for symptom management vs admission to Hospice services now.  As she prefers to have access to Hospice services immediately, she has chosen that route. Referral placed to Hospice agency of preference at end of today's visit.   She reports having a DDNR signed at home as well as an AMD.      Length of Voluntary ACP Conversation in minutes:  30 minutes    Miriam Galloway MD

## 2023-01-01 ENCOUNTER — HOSPICE ADMISSION (OUTPATIENT)
Dept: HOSPICE | Facility: HOSPICE | Age: 68
End: 2023-01-01
Payer: OTHER MISCELLANEOUS

## 2023-01-01 ENCOUNTER — HOSPITAL ENCOUNTER (INPATIENT)
Age: 68
LOS: 24 days | End: 2023-03-29
Attending: INTERNAL MEDICINE | Admitting: INTERNAL MEDICINE
Payer: OTHER MISCELLANEOUS

## 2023-01-01 VITALS
SYSTOLIC BLOOD PRESSURE: 84 MMHG | DIASTOLIC BLOOD PRESSURE: 49 MMHG | OXYGEN SATURATION: 69 % | TEMPERATURE: 99.7 F | HEART RATE: 84 BPM | RESPIRATION RATE: 28 BRPM

## 2023-01-01 DIAGNOSIS — F41.8 ANXIETY ABOUT HEALTH: ICD-10-CM

## 2023-01-01 DIAGNOSIS — R11.2 NAUSEA AND VOMITING, UNSPECIFIED VOMITING TYPE: ICD-10-CM

## 2023-01-01 DIAGNOSIS — F05 DELIRIUM DUE TO GENERAL MEDICAL CONDITION: ICD-10-CM

## 2023-01-01 DIAGNOSIS — R41.0 DELIRIUM: ICD-10-CM

## 2023-01-01 DIAGNOSIS — C56.9 MALIGNANT NEOPLASM OF OVARY, UNSPECIFIED LATERALITY (HCC): Primary | ICD-10-CM

## 2023-01-01 DIAGNOSIS — K59.01 SLOW TRANSIT CONSTIPATION: ICD-10-CM

## 2023-01-01 DIAGNOSIS — Z51.5 HOSPICE CARE: ICD-10-CM

## 2023-01-01 DIAGNOSIS — G89.3 CANCER RELATED PAIN: ICD-10-CM

## 2023-01-01 PROCEDURE — 74011250636 HC RX REV CODE- 250/636: Performed by: FAMILY MEDICINE

## 2023-01-01 PROCEDURE — 0656 HSPC GENERAL INPATIENT

## 2023-01-01 PROCEDURE — G0299 HHS/HOSPICE OF RN EA 15 MIN: HCPCS

## 2023-01-01 PROCEDURE — G0300 HHS/HOSPICE OF LPN EA 15 MIN: HCPCS

## 2023-01-01 PROCEDURE — 74011000250 HC RX REV CODE- 250: Performed by: INTERNAL MEDICINE

## 2023-01-01 PROCEDURE — 74011250636 HC RX REV CODE- 250/636: Performed by: INTERNAL MEDICINE

## 2023-01-01 PROCEDURE — 74011000250 HC RX REV CODE- 250: Performed by: FAMILY MEDICINE

## 2023-01-01 PROCEDURE — 74011250637 HC RX REV CODE- 250/637: Performed by: FAMILY MEDICINE

## 2023-01-01 PROCEDURE — G0156 HHCP-SVS OF AIDE,EA 15 MIN: HCPCS

## 2023-01-01 PROCEDURE — 99233 SBSQ HOSP IP/OBS HIGH 50: CPT | Performed by: INTERNAL MEDICINE

## 2023-01-01 PROCEDURE — 74011250637 HC RX REV CODE- 250/637: Performed by: INTERNAL MEDICINE

## 2023-01-01 RX ORDER — PROCHLORPERAZINE EDISYLATE 5 MG/ML
10 INJECTION INTRAMUSCULAR; INTRAVENOUS
Status: DISCONTINUED | OUTPATIENT
Start: 2023-01-01 | End: 2023-01-01

## 2023-01-01 RX ORDER — ONDANSETRON 2 MG/ML
4 INJECTION INTRAMUSCULAR; INTRAVENOUS EVERY 8 HOURS
Status: DISCONTINUED | OUTPATIENT
Start: 2023-01-01 | End: 2023-01-01

## 2023-01-01 RX ORDER — MIDAZOLAM HYDROCHLORIDE 1 MG/ML
4 INJECTION, SOLUTION INTRAMUSCULAR; INTRAVENOUS
Status: DISCONTINUED | OUTPATIENT
Start: 2023-01-01 | End: 2023-01-01

## 2023-01-01 RX ORDER — GLYCOPYRROLATE 0.2 MG/ML
0.2 INJECTION INTRAMUSCULAR; INTRAVENOUS
Status: DISCONTINUED | OUTPATIENT
Start: 2023-01-01 | End: 2023-01-01

## 2023-01-01 RX ORDER — SENNOSIDES 8.6 MG/1
1 TABLET ORAL DAILY
Status: DISCONTINUED | OUTPATIENT
Start: 2023-01-01 | End: 2023-01-01

## 2023-01-01 RX ORDER — FACIAL-BODY WIPES
10 EACH TOPICAL DAILY PRN
Status: DISCONTINUED | OUTPATIENT
Start: 2023-01-01 | End: 2023-01-01 | Stop reason: HOSPADM

## 2023-01-01 RX ORDER — PHENOBARBITAL SODIUM 65 MG/ML
65 INJECTION INTRAMUSCULAR ONCE
Status: COMPLETED | OUTPATIENT
Start: 2023-01-01 | End: 2023-01-01

## 2023-01-01 RX ORDER — MORPHINE SULFATE 4 MG/ML
4 INJECTION INTRAVENOUS EVERY 4 HOURS
Status: DISCONTINUED | OUTPATIENT
Start: 2023-01-01 | End: 2023-01-01

## 2023-01-01 RX ORDER — SODIUM CHLORIDE 9 MG/ML
75 INJECTION, SOLUTION INTRAVENOUS CONTINUOUS
Status: DISPENSED | OUTPATIENT
Start: 2023-01-01 | End: 2023-01-01

## 2023-01-01 RX ORDER — MORPHINE SULFATE 4 MG/ML
4 INJECTION INTRAVENOUS
Status: DISCONTINUED | OUTPATIENT
Start: 2023-01-01 | End: 2023-01-01

## 2023-01-01 RX ORDER — FENTANYL 25 UG/1
1 PATCH TRANSDERMAL
Status: DISCONTINUED | OUTPATIENT
Start: 2023-01-01 | End: 2023-01-01

## 2023-01-01 RX ORDER — GLYCOPYRROLATE 0.2 MG/ML
0.2 INJECTION INTRAMUSCULAR; INTRAVENOUS EVERY 4 HOURS
Status: DISCONTINUED | OUTPATIENT
Start: 2023-01-01 | End: 2023-01-01 | Stop reason: HOSPADM

## 2023-01-01 RX ORDER — HYDROMORPHONE HYDROCHLORIDE 1 MG/ML
1 INJECTION, SOLUTION INTRAMUSCULAR; INTRAVENOUS; SUBCUTANEOUS
Status: COMPLETED | OUTPATIENT
Start: 2023-01-01 | End: 2023-01-01

## 2023-01-01 RX ORDER — LORAZEPAM 2 MG/ML
2 INJECTION INTRAMUSCULAR
Status: DISCONTINUED | OUTPATIENT
Start: 2023-01-01 | End: 2023-01-01 | Stop reason: HOSPADM

## 2023-01-01 RX ORDER — HALOPERIDOL 5 MG/ML
2 INJECTION INTRAMUSCULAR
Status: DISCONTINUED | OUTPATIENT
Start: 2023-01-01 | End: 2023-01-01

## 2023-01-01 RX ORDER — HALOPERIDOL 5 MG/ML
5 INJECTION INTRAMUSCULAR EVERY 4 HOURS
Status: DISCONTINUED | OUTPATIENT
Start: 2023-01-01 | End: 2023-01-01

## 2023-01-01 RX ORDER — PHENOBARBITAL SODIUM 65 MG/ML
130 INJECTION INTRAMUSCULAR 3 TIMES DAILY
Status: DISCONTINUED | OUTPATIENT
Start: 2023-01-01 | End: 2023-01-01 | Stop reason: HOSPADM

## 2023-01-01 RX ORDER — MIDAZOLAM HYDROCHLORIDE 1 MG/ML
2 INJECTION, SOLUTION INTRAMUSCULAR; INTRAVENOUS
Status: DISCONTINUED | OUTPATIENT
Start: 2023-01-01 | End: 2023-01-01

## 2023-01-01 RX ORDER — AMOXICILLIN 250 MG
2 CAPSULE ORAL 2 TIMES DAILY
Status: DISCONTINUED | OUTPATIENT
Start: 2023-01-01 | End: 2023-01-01

## 2023-01-01 RX ORDER — MIDAZOLAM HYDROCHLORIDE 1 MG/ML
2 INJECTION, SOLUTION INTRAMUSCULAR; INTRAVENOUS EVERY 4 HOURS
Status: DISCONTINUED | OUTPATIENT
Start: 2023-01-01 | End: 2023-01-01

## 2023-01-01 RX ORDER — DEXAMETHASONE 4 MG/1
2 TABLET ORAL DAILY
Status: DISCONTINUED | OUTPATIENT
Start: 2023-01-01 | End: 2023-01-01

## 2023-01-01 RX ORDER — FENTANYL CITRATE/PF 1500MCG/30
PATIENT CONTROLLED ANALGESIA SYRINGE INTRAVENOUS
Status: DISCONTINUED | OUTPATIENT
Start: 2023-01-01 | End: 2023-01-01

## 2023-01-01 RX ORDER — KETOROLAC TROMETHAMINE 30 MG/ML
30 INJECTION, SOLUTION INTRAMUSCULAR; INTRAVENOUS
Status: DISCONTINUED | OUTPATIENT
Start: 2023-01-01 | End: 2023-01-01 | Stop reason: HOSPADM

## 2023-01-01 RX ORDER — ONDANSETRON 2 MG/ML
4 INJECTION INTRAMUSCULAR; INTRAVENOUS
Status: DISCONTINUED | OUTPATIENT
Start: 2023-01-01 | End: 2023-01-01 | Stop reason: HOSPADM

## 2023-01-01 RX ORDER — LORAZEPAM 2 MG/ML
2 INJECTION INTRAMUSCULAR EVERY 4 HOURS
Status: DISCONTINUED | OUTPATIENT
Start: 2023-01-01 | End: 2023-01-01 | Stop reason: HOSPADM

## 2023-01-01 RX ORDER — SODIUM CHLORIDE 9 MG/ML
75 INJECTION, SOLUTION INTRAVENOUS ONCE
Status: DISCONTINUED | OUTPATIENT
Start: 2023-01-01 | End: 2023-01-01 | Stop reason: SDUPTHER

## 2023-01-01 RX ORDER — HALOPERIDOL 2 MG/ML
2 SOLUTION ORAL EVERY 4 HOURS
Status: DISCONTINUED | OUTPATIENT
Start: 2023-01-01 | End: 2023-01-01

## 2023-01-01 RX ORDER — HALOPERIDOL 5 MG/ML
2 INJECTION INTRAMUSCULAR EVERY 4 HOURS
Status: DISCONTINUED | OUTPATIENT
Start: 2023-01-01 | End: 2023-01-01

## 2023-01-01 RX ORDER — LORAZEPAM 2 MG/ML
1 INJECTION INTRAMUSCULAR
Status: DISCONTINUED | OUTPATIENT
Start: 2023-01-01 | End: 2023-01-01

## 2023-01-01 RX ORDER — HALOPERIDOL 5 MG/ML
5 INJECTION INTRAMUSCULAR
Status: COMPLETED | OUTPATIENT
Start: 2023-01-01 | End: 2023-01-01

## 2023-01-01 RX ORDER — PROCHLORPERAZINE EDISYLATE 5 MG/ML
10 INJECTION INTRAMUSCULAR; INTRAVENOUS EVERY 6 HOURS
Status: DISCONTINUED | OUTPATIENT
Start: 2023-01-01 | End: 2023-01-01

## 2023-01-01 RX ADMIN — LORAZEPAM 2 MG: 2 INJECTION INTRAMUSCULAR; INTRAVENOUS at 12:13

## 2023-01-01 RX ADMIN — LORAZEPAM 2 MG: 2 INJECTION INTRAMUSCULAR; INTRAVENOUS at 12:48

## 2023-01-01 RX ADMIN — HALOPERIDOL LACTATE 5 MG: 5 INJECTION, SOLUTION INTRAMUSCULAR at 15:57

## 2023-01-01 RX ADMIN — HALOPERIDOL LACTATE 5 MG: 5 INJECTION, SOLUTION INTRAMUSCULAR at 16:10

## 2023-01-01 RX ADMIN — MORPHINE SULFATE 4 MG: 4 INJECTION INTRAVENOUS at 01:07

## 2023-01-01 RX ADMIN — Medication: at 05:58

## 2023-01-01 RX ADMIN — PHENOBARBITAL SODIUM 130 MG: 65 INJECTION INTRAMUSCULAR at 09:00

## 2023-01-01 RX ADMIN — Medication: at 03:50

## 2023-01-01 RX ADMIN — HALOPERIDOL LACTATE 5 MG: 5 INJECTION, SOLUTION INTRAMUSCULAR at 00:15

## 2023-01-01 RX ADMIN — MORPHINE SULFATE 4 MG: 4 INJECTION INTRAVENOUS at 13:25

## 2023-01-01 RX ADMIN — PROCHLORPERAZINE EDISYLATE 10 MG: 5 INJECTION INTRAMUSCULAR; INTRAVENOUS at 01:17

## 2023-01-01 RX ADMIN — MIDAZOLAM 4 MG: 1 INJECTION INTRAMUSCULAR; INTRAVENOUS at 00:00

## 2023-01-01 RX ADMIN — PROCHLORPERAZINE EDISYLATE 10 MG: 5 INJECTION INTRAMUSCULAR; INTRAVENOUS at 12:20

## 2023-01-01 RX ADMIN — ONDANSETRON 4 MG: 2 INJECTION INTRAMUSCULAR; INTRAVENOUS at 21:07

## 2023-01-01 RX ADMIN — GLYCOPYRROLATE 0.2 MG: 0.2 INJECTION INTRAMUSCULAR; INTRAVENOUS at 04:25

## 2023-01-01 RX ADMIN — LORAZEPAM 2 MG: 2 INJECTION INTRAMUSCULAR; INTRAVENOUS at 16:19

## 2023-01-01 RX ADMIN — HALOPERIDOL LACTATE 5 MG: 5 INJECTION, SOLUTION INTRAMUSCULAR at 08:43

## 2023-01-01 RX ADMIN — HALOPERIDOL LACTATE 5 MG: 5 INJECTION, SOLUTION INTRAMUSCULAR at 04:20

## 2023-01-01 RX ADMIN — LORAZEPAM 2 MG: 2 INJECTION INTRAMUSCULAR; INTRAVENOUS at 08:33

## 2023-01-01 RX ADMIN — GLYCOPYRROLATE 0.2 MG: 0.2 INJECTION INTRAMUSCULAR; INTRAVENOUS at 19:56

## 2023-01-01 RX ADMIN — MIDAZOLAM 4 MG: 1 INJECTION INTRAMUSCULAR; INTRAVENOUS at 15:56

## 2023-01-01 RX ADMIN — MORPHINE SULFATE 4 MG: 4 INJECTION INTRAVENOUS at 13:04

## 2023-01-01 RX ADMIN — PROCHLORPERAZINE EDISYLATE 10 MG: 5 INJECTION INTRAMUSCULAR; INTRAVENOUS at 20:17

## 2023-01-01 RX ADMIN — HALOPERIDOL LACTATE 5 MG: 5 INJECTION, SOLUTION INTRAMUSCULAR at 08:11

## 2023-01-01 RX ADMIN — ONDANSETRON 4 MG: 2 INJECTION INTRAMUSCULAR; INTRAVENOUS at 22:54

## 2023-01-01 RX ADMIN — MIDAZOLAM 4 MG: 1 INJECTION INTRAMUSCULAR; INTRAVENOUS at 14:21

## 2023-01-01 RX ADMIN — PHENOBARBITAL SODIUM 130 MG: 65 INJECTION INTRAMUSCULAR at 09:01

## 2023-01-01 RX ADMIN — HALOPERIDOL LACTATE 5 MG: 5 INJECTION, SOLUTION INTRAMUSCULAR at 12:49

## 2023-01-01 RX ADMIN — ONDANSETRON 4 MG: 2 INJECTION INTRAMUSCULAR; INTRAVENOUS at 05:46

## 2023-01-01 RX ADMIN — PROCHLORPERAZINE EDISYLATE 10 MG: 5 INJECTION INTRAMUSCULAR; INTRAVENOUS at 11:55

## 2023-01-01 RX ADMIN — LORAZEPAM 2 MG: 2 INJECTION INTRAMUSCULAR; INTRAVENOUS at 15:57

## 2023-01-01 RX ADMIN — LORAZEPAM 2 MG: 2 INJECTION INTRAMUSCULAR; INTRAVENOUS at 00:06

## 2023-01-01 RX ADMIN — LORAZEPAM 2 MG: 2 INJECTION INTRAMUSCULAR; INTRAVENOUS at 05:33

## 2023-01-01 RX ADMIN — MIDAZOLAM 4 MG: 1 INJECTION INTRAMUSCULAR; INTRAVENOUS at 23:43

## 2023-01-01 RX ADMIN — MIDAZOLAM HYDROCHLORIDE 2 MG: 1 INJECTION, SOLUTION INTRAMUSCULAR; INTRAVENOUS at 01:09

## 2023-01-01 RX ADMIN — LORAZEPAM 2 MG: 2 INJECTION INTRAMUSCULAR; INTRAVENOUS at 08:02

## 2023-01-01 RX ADMIN — Medication: at 03:13

## 2023-01-01 RX ADMIN — MIDAZOLAM HYDROCHLORIDE 2 MG: 1 INJECTION, SOLUTION INTRAMUSCULAR; INTRAVENOUS at 00:50

## 2023-01-01 RX ADMIN — LORAZEPAM 2 MG: 2 INJECTION INTRAMUSCULAR; INTRAVENOUS at 07:43

## 2023-01-01 RX ADMIN — Medication: at 20:44

## 2023-01-01 RX ADMIN — PHENOBARBITAL SODIUM 130 MG: 65 INJECTION INTRAMUSCULAR at 08:43

## 2023-01-01 RX ADMIN — PROCHLORPERAZINE EDISYLATE 10 MG: 5 INJECTION INTRAMUSCULAR; INTRAVENOUS at 06:24

## 2023-01-01 RX ADMIN — HALOPERIDOL LACTATE 5 MG: 5 INJECTION, SOLUTION INTRAMUSCULAR at 04:09

## 2023-01-01 RX ADMIN — LORAZEPAM 2 MG: 2 INJECTION INTRAMUSCULAR; INTRAVENOUS at 12:24

## 2023-01-01 RX ADMIN — PHENOBARBITAL SODIUM 130 MG: 65 INJECTION INTRAMUSCULAR at 16:21

## 2023-01-01 RX ADMIN — LORAZEPAM 2 MG: 2 INJECTION INTRAMUSCULAR; INTRAVENOUS at 16:35

## 2023-01-01 RX ADMIN — MIDAZOLAM 4 MG: 1 INJECTION INTRAMUSCULAR; INTRAVENOUS at 07:28

## 2023-01-01 RX ADMIN — GLYCOPYRROLATE 0.2 MG: 0.2 INJECTION INTRAMUSCULAR; INTRAVENOUS at 22:39

## 2023-01-01 RX ADMIN — MIDAZOLAM 4 MG: 1 INJECTION INTRAMUSCULAR; INTRAVENOUS at 18:16

## 2023-01-01 RX ADMIN — LORAZEPAM 2 MG: 2 INJECTION INTRAMUSCULAR; INTRAVENOUS at 03:59

## 2023-01-01 RX ADMIN — HALOPERIDOL LACTATE 5 MG: 5 INJECTION, SOLUTION INTRAMUSCULAR at 00:00

## 2023-01-01 RX ADMIN — PROCHLORPERAZINE EDISYLATE 10 MG: 5 INJECTION INTRAMUSCULAR; INTRAVENOUS at 00:34

## 2023-01-01 RX ADMIN — LORAZEPAM 2 MG: 2 INJECTION INTRAMUSCULAR; INTRAVENOUS at 00:10

## 2023-01-01 RX ADMIN — PHENOBARBITAL SODIUM 130 MG: 65 INJECTION INTRAMUSCULAR at 09:11

## 2023-01-01 RX ADMIN — HALOPERIDOL LACTATE 5 MG: 5 INJECTION, SOLUTION INTRAMUSCULAR at 11:48

## 2023-01-01 RX ADMIN — MIDAZOLAM 4 MG: 1 INJECTION INTRAMUSCULAR; INTRAVENOUS at 12:33

## 2023-01-01 RX ADMIN — PROCHLORPERAZINE EDISYLATE 10 MG: 5 INJECTION INTRAMUSCULAR; INTRAVENOUS at 10:36

## 2023-01-01 RX ADMIN — HALOPERIDOL LACTATE 5 MG: 5 INJECTION, SOLUTION INTRAMUSCULAR at 20:08

## 2023-01-01 RX ADMIN — PROCHLORPERAZINE EDISYLATE 10 MG: 5 INJECTION INTRAMUSCULAR; INTRAVENOUS at 06:08

## 2023-01-01 RX ADMIN — SODIUM CHLORIDE 75 ML/HR: 900 INJECTION, SOLUTION INTRAVENOUS at 15:54

## 2023-01-01 RX ADMIN — PROCHLORPERAZINE EDISYLATE 10 MG: 5 INJECTION INTRAMUSCULAR; INTRAVENOUS at 12:13

## 2023-01-01 RX ADMIN — MIDAZOLAM HYDROCHLORIDE 2 MG: 1 INJECTION, SOLUTION INTRAMUSCULAR; INTRAVENOUS at 17:17

## 2023-01-01 RX ADMIN — MIDAZOLAM 4 MG: 1 INJECTION INTRAMUSCULAR; INTRAVENOUS at 06:02

## 2023-01-01 RX ADMIN — LORAZEPAM 2 MG: 2 INJECTION INTRAMUSCULAR; INTRAVENOUS at 19:54

## 2023-01-01 RX ADMIN — Medication: at 04:33

## 2023-01-01 RX ADMIN — PHENOBARBITAL SODIUM 65 MG: 65 INJECTION INTRAMUSCULAR; INTRAVENOUS at 11:32

## 2023-01-01 RX ADMIN — MIDAZOLAM HYDROCHLORIDE 2 MG: 1 INJECTION, SOLUTION INTRAMUSCULAR; INTRAVENOUS at 05:42

## 2023-01-01 RX ADMIN — Medication: at 15:01

## 2023-01-01 RX ADMIN — LORAZEPAM 2 MG: 2 INJECTION INTRAMUSCULAR; INTRAVENOUS at 04:05

## 2023-01-01 RX ADMIN — MIDAZOLAM 4 MG: 1 INJECTION INTRAMUSCULAR; INTRAVENOUS at 14:02

## 2023-01-01 RX ADMIN — PROCHLORPERAZINE EDISYLATE 10 MG: 5 INJECTION INTRAMUSCULAR; INTRAVENOUS at 17:48

## 2023-01-01 RX ADMIN — HALOPERIDOL LACTATE 2 MG: 5 INJECTION, SOLUTION INTRAMUSCULAR at 03:20

## 2023-01-01 RX ADMIN — Medication: at 19:33

## 2023-01-01 RX ADMIN — MIDAZOLAM 4 MG: 1 INJECTION INTRAMUSCULAR; INTRAVENOUS at 01:58

## 2023-01-01 RX ADMIN — MIDAZOLAM HYDROCHLORIDE 2 MG: 1 INJECTION, SOLUTION INTRAMUSCULAR; INTRAVENOUS at 04:56

## 2023-01-01 RX ADMIN — LORAZEPAM 2 MG: 2 INJECTION INTRAMUSCULAR; INTRAVENOUS at 20:42

## 2023-01-01 RX ADMIN — Medication: at 23:48

## 2023-01-01 RX ADMIN — ONDANSETRON 4 MG: 2 INJECTION INTRAMUSCULAR; INTRAVENOUS at 06:16

## 2023-01-01 RX ADMIN — Medication: at 08:55

## 2023-01-01 RX ADMIN — PROCHLORPERAZINE EDISYLATE 10 MG: 5 INJECTION INTRAMUSCULAR; INTRAVENOUS at 13:47

## 2023-01-01 RX ADMIN — PHENOBARBITAL SODIUM 130 MG: 65 INJECTION INTRAMUSCULAR at 16:34

## 2023-01-01 RX ADMIN — Medication: at 20:14

## 2023-01-01 RX ADMIN — HALOPERIDOL LACTATE 5 MG: 5 INJECTION, SOLUTION INTRAMUSCULAR at 12:48

## 2023-01-01 RX ADMIN — Medication: at 21:25

## 2023-01-01 RX ADMIN — MIDAZOLAM HYDROCHLORIDE 2 MG: 1 INJECTION, SOLUTION INTRAMUSCULAR; INTRAVENOUS at 21:29

## 2023-01-01 RX ADMIN — LORAZEPAM 2 MG: 2 INJECTION INTRAMUSCULAR; INTRAVENOUS at 08:11

## 2023-01-01 RX ADMIN — LORAZEPAM 2 MG: 2 INJECTION INTRAMUSCULAR; INTRAVENOUS at 20:49

## 2023-01-01 RX ADMIN — LORAZEPAM 2 MG: 2 INJECTION INTRAMUSCULAR; INTRAVENOUS at 08:21

## 2023-01-01 RX ADMIN — Medication: at 11:19

## 2023-01-01 RX ADMIN — PROCHLORPERAZINE EDISYLATE 10 MG: 5 INJECTION INTRAMUSCULAR; INTRAVENOUS at 00:40

## 2023-01-01 RX ADMIN — PROCHLORPERAZINE EDISYLATE 10 MG: 5 INJECTION INTRAMUSCULAR; INTRAVENOUS at 06:19

## 2023-01-01 RX ADMIN — MIDAZOLAM HYDROCHLORIDE 2 MG: 1 INJECTION, SOLUTION INTRAMUSCULAR; INTRAVENOUS at 12:37

## 2023-01-01 RX ADMIN — PHENOBARBITAL SODIUM 130 MG: 65 INJECTION INTRAMUSCULAR at 08:36

## 2023-01-01 RX ADMIN — HALOPERIDOL LACTATE 5 MG: 5 INJECTION, SOLUTION INTRAMUSCULAR at 16:01

## 2023-01-01 RX ADMIN — MIDAZOLAM 4 MG: 1 INJECTION INTRAMUSCULAR; INTRAVENOUS at 12:21

## 2023-01-01 RX ADMIN — HALOPERIDOL LACTATE 5 MG: 5 INJECTION, SOLUTION INTRAMUSCULAR at 23:44

## 2023-01-01 RX ADMIN — MIDAZOLAM HYDROCHLORIDE 4 MG: 1 INJECTION, SOLUTION INTRAMUSCULAR; INTRAVENOUS at 11:50

## 2023-01-01 RX ADMIN — Medication: at 08:49

## 2023-01-01 RX ADMIN — MIDAZOLAM HYDROCHLORIDE 2 MG: 1 INJECTION, SOLUTION INTRAMUSCULAR; INTRAVENOUS at 13:06

## 2023-01-01 RX ADMIN — PHENOBARBITAL SODIUM 130 MG: 65 INJECTION INTRAMUSCULAR at 22:31

## 2023-01-01 RX ADMIN — PROCHLORPERAZINE EDISYLATE 10 MG: 5 INJECTION INTRAMUSCULAR; INTRAVENOUS at 23:58

## 2023-01-01 RX ADMIN — LORAZEPAM 2 MG: 2 INJECTION INTRAMUSCULAR; INTRAVENOUS at 04:09

## 2023-01-01 RX ADMIN — HALOPERIDOL LACTATE 5 MG: 5 INJECTION, SOLUTION INTRAMUSCULAR at 20:42

## 2023-01-01 RX ADMIN — Medication: at 08:12

## 2023-01-01 RX ADMIN — PHENOBARBITAL SODIUM 130 MG: 65 INJECTION INTRAMUSCULAR at 21:56

## 2023-01-01 RX ADMIN — PROCHLORPERAZINE EDISYLATE 10 MG: 5 INJECTION INTRAMUSCULAR; INTRAVENOUS at 06:30

## 2023-01-01 RX ADMIN — HALOPERIDOL LACTATE 5 MG: 5 INJECTION, SOLUTION INTRAMUSCULAR at 20:07

## 2023-01-01 RX ADMIN — ONDANSETRON 4 MG: 2 INJECTION INTRAMUSCULAR; INTRAVENOUS at 05:29

## 2023-01-01 RX ADMIN — MORPHINE SULFATE 4 MG: 4 INJECTION INTRAVENOUS at 05:29

## 2023-01-01 RX ADMIN — MIDAZOLAM 4 MG: 1 INJECTION INTRAMUSCULAR; INTRAVENOUS at 09:07

## 2023-01-01 RX ADMIN — PROCHLORPERAZINE EDISYLATE 10 MG: 5 INJECTION INTRAMUSCULAR; INTRAVENOUS at 00:54

## 2023-01-01 RX ADMIN — MORPHINE SULFATE 4 MG: 4 INJECTION INTRAVENOUS at 15:30

## 2023-01-01 RX ADMIN — MIDAZOLAM HYDROCHLORIDE 2 MG: 1 INJECTION, SOLUTION INTRAMUSCULAR; INTRAVENOUS at 00:35

## 2023-01-01 RX ADMIN — PHENOBARBITAL SODIUM 130 MG: 65 INJECTION INTRAMUSCULAR at 16:46

## 2023-01-01 RX ADMIN — PROCHLORPERAZINE EDISYLATE 10 MG: 5 INJECTION INTRAMUSCULAR; INTRAVENOUS at 16:58

## 2023-01-01 RX ADMIN — LORAZEPAM 2 MG: 2 INJECTION INTRAMUSCULAR; INTRAVENOUS at 16:01

## 2023-01-01 RX ADMIN — MORPHINE SULFATE 4 MG: 4 INJECTION INTRAVENOUS at 08:51

## 2023-01-01 RX ADMIN — MORPHINE SULFATE 4 MG: 4 INJECTION INTRAVENOUS at 07:34

## 2023-01-01 RX ADMIN — PROCHLORPERAZINE EDISYLATE 10 MG: 5 INJECTION INTRAMUSCULAR; INTRAVENOUS at 18:09

## 2023-01-01 RX ADMIN — MIDAZOLAM HYDROCHLORIDE 2 MG: 1 INJECTION, SOLUTION INTRAMUSCULAR; INTRAVENOUS at 09:02

## 2023-01-01 RX ADMIN — MORPHINE SULFATE 4 MG: 4 INJECTION INTRAVENOUS at 06:24

## 2023-01-01 RX ADMIN — LORAZEPAM 2 MG: 2 INJECTION INTRAMUSCULAR; INTRAVENOUS at 12:49

## 2023-01-01 RX ADMIN — MIDAZOLAM HYDROCHLORIDE 2 MG: 1 INJECTION, SOLUTION INTRAMUSCULAR; INTRAVENOUS at 00:55

## 2023-01-01 RX ADMIN — Medication: at 11:18

## 2023-01-01 RX ADMIN — MIDAZOLAM HYDROCHLORIDE 2 MG: 1 INJECTION, SOLUTION INTRAMUSCULAR; INTRAVENOUS at 04:29

## 2023-01-01 RX ADMIN — MIDAZOLAM 4 MG: 1 INJECTION INTRAMUSCULAR; INTRAVENOUS at 11:04

## 2023-01-01 RX ADMIN — MIDAZOLAM HYDROCHLORIDE 2 MG: 1 INJECTION, SOLUTION INTRAMUSCULAR; INTRAVENOUS at 20:43

## 2023-01-01 RX ADMIN — MORPHINE SULFATE 4 MG: 4 INJECTION INTRAVENOUS at 20:18

## 2023-01-01 RX ADMIN — MIDAZOLAM 2 MG: 1 INJECTION INTRAMUSCULAR; INTRAVENOUS at 17:17

## 2023-01-01 RX ADMIN — PROCHLORPERAZINE EDISYLATE 10 MG: 5 INJECTION INTRAMUSCULAR; INTRAVENOUS at 12:24

## 2023-01-01 RX ADMIN — GLYCOPYRROLATE 0.2 MG: 0.2 INJECTION INTRAMUSCULAR; INTRAVENOUS at 16:35

## 2023-01-01 RX ADMIN — HALOPERIDOL LACTATE 5 MG: 5 INJECTION, SOLUTION INTRAMUSCULAR at 12:24

## 2023-01-01 RX ADMIN — PROCHLORPERAZINE EDISYLATE 10 MG: 5 INJECTION INTRAMUSCULAR; INTRAVENOUS at 18:38

## 2023-01-01 RX ADMIN — HALOPERIDOL LACTATE 5 MG: 5 INJECTION, SOLUTION INTRAMUSCULAR at 03:54

## 2023-01-01 RX ADMIN — Medication: at 04:46

## 2023-01-01 RX ADMIN — Medication: at 15:59

## 2023-01-01 RX ADMIN — MIDAZOLAM 4 MG: 1 INJECTION INTRAMUSCULAR; INTRAVENOUS at 06:36

## 2023-01-01 RX ADMIN — HALOPERIDOL LACTATE 5 MG: 5 INJECTION, SOLUTION INTRAMUSCULAR at 23:51

## 2023-01-01 RX ADMIN — HALOPERIDOL LACTATE 5 MG: 5 INJECTION, SOLUTION INTRAMUSCULAR at 20:10

## 2023-01-01 RX ADMIN — MIDAZOLAM 4 MG: 1 INJECTION INTRAMUSCULAR; INTRAVENOUS at 16:41

## 2023-01-01 RX ADMIN — HALOPERIDOL LACTATE 2 MG: 5 INJECTION, SOLUTION INTRAMUSCULAR at 23:45

## 2023-01-01 RX ADMIN — MIDAZOLAM 2 MG: 1 INJECTION INTRAMUSCULAR; INTRAVENOUS at 09:44

## 2023-01-01 RX ADMIN — MORPHINE SULFATE 4 MG: 4 INJECTION INTRAVENOUS at 03:03

## 2023-01-01 RX ADMIN — PHENOBARBITAL SODIUM 130 MG: 65 INJECTION INTRAMUSCULAR at 15:56

## 2023-01-01 RX ADMIN — MORPHINE SULFATE 4 MG: 4 INJECTION INTRAVENOUS at 21:07

## 2023-01-01 RX ADMIN — PHENOBARBITAL SODIUM 130 MG: 65 INJECTION INTRAMUSCULAR at 16:41

## 2023-01-01 RX ADMIN — GLYCOPYRROLATE 0.2 MG: 0.2 INJECTION INTRAMUSCULAR; INTRAVENOUS at 15:01

## 2023-01-01 RX ADMIN — Medication: at 12:28

## 2023-01-01 RX ADMIN — HALOPERIDOL LACTATE 5 MG: 5 INJECTION, SOLUTION INTRAMUSCULAR at 08:21

## 2023-01-01 RX ADMIN — LORAZEPAM 2 MG: 2 INJECTION INTRAMUSCULAR; INTRAVENOUS at 11:48

## 2023-01-01 RX ADMIN — PROCHLORPERAZINE EDISYLATE 10 MG: 5 INJECTION INTRAMUSCULAR; INTRAVENOUS at 17:53

## 2023-01-01 RX ADMIN — MIDAZOLAM 4 MG: 1 INJECTION INTRAMUSCULAR; INTRAVENOUS at 03:54

## 2023-01-01 RX ADMIN — MIDAZOLAM HYDROCHLORIDE 2 MG: 1 INJECTION, SOLUTION INTRAMUSCULAR; INTRAVENOUS at 12:52

## 2023-01-01 RX ADMIN — GLYCOPYRROLATE 0.2 MG: 0.2 INJECTION INTRAMUSCULAR; INTRAVENOUS at 06:01

## 2023-01-01 RX ADMIN — Medication: at 07:02

## 2023-01-01 RX ADMIN — HALOPERIDOL LACTATE 5 MG: 5 INJECTION, SOLUTION INTRAMUSCULAR at 08:02

## 2023-01-01 RX ADMIN — MIDAZOLAM 4 MG: 1 INJECTION INTRAMUSCULAR; INTRAVENOUS at 18:00

## 2023-01-01 RX ADMIN — HYDROMORPHONE HYDROCHLORIDE 1 MG: 1 INJECTION, SOLUTION INTRAMUSCULAR; INTRAVENOUS; SUBCUTANEOUS at 10:22

## 2023-01-01 RX ADMIN — MIDAZOLAM HYDROCHLORIDE 4 MG: 1 INJECTION, SOLUTION INTRAMUSCULAR; INTRAVENOUS at 12:15

## 2023-01-01 RX ADMIN — Medication: at 09:44

## 2023-01-01 RX ADMIN — MIDAZOLAM 2 MG: 1 INJECTION INTRAMUSCULAR; INTRAVENOUS at 11:37

## 2023-01-01 RX ADMIN — HALOPERIDOL LACTATE 5 MG: 5 INJECTION, SOLUTION INTRAMUSCULAR at 00:01

## 2023-01-01 RX ADMIN — PROCHLORPERAZINE EDISYLATE 10 MG: 5 INJECTION INTRAMUSCULAR; INTRAVENOUS at 06:01

## 2023-01-01 RX ADMIN — HALOPERIDOL LACTATE 5 MG: 5 INJECTION, SOLUTION INTRAMUSCULAR at 04:04

## 2023-01-01 RX ADMIN — MIDAZOLAM 4 MG: 1 INJECTION INTRAMUSCULAR; INTRAVENOUS at 05:55

## 2023-01-01 RX ADMIN — MIDAZOLAM 4 MG: 1 INJECTION INTRAMUSCULAR; INTRAVENOUS at 08:43

## 2023-01-01 RX ADMIN — PHENOBARBITAL SODIUM 130 MG: 65 INJECTION INTRAMUSCULAR at 16:11

## 2023-01-01 RX ADMIN — Medication: at 09:06

## 2023-01-01 RX ADMIN — PROCHLORPERAZINE EDISYLATE 10 MG: 5 INJECTION INTRAMUSCULAR; INTRAVENOUS at 00:35

## 2023-01-01 RX ADMIN — Medication: at 00:20

## 2023-01-01 RX ADMIN — LORAZEPAM 2 MG: 2 INJECTION INTRAMUSCULAR; INTRAVENOUS at 00:15

## 2023-01-01 RX ADMIN — GLYCOPYRROLATE 0.2 MG: 0.2 INJECTION INTRAMUSCULAR; INTRAVENOUS at 04:05

## 2023-01-01 RX ADMIN — MIDAZOLAM HYDROCHLORIDE 2 MG: 1 INJECTION, SOLUTION INTRAMUSCULAR; INTRAVENOUS at 20:39

## 2023-01-01 RX ADMIN — PHENOBARBITAL SODIUM 130 MG: 65 INJECTION INTRAMUSCULAR at 22:40

## 2023-01-01 RX ADMIN — HALOPERIDOL LACTATE 5 MG: 5 INJECTION, SOLUTION INTRAMUSCULAR at 20:48

## 2023-01-01 RX ADMIN — HALOPERIDOL LACTATE 5 MG: 5 INJECTION, SOLUTION INTRAMUSCULAR at 16:46

## 2023-01-01 RX ADMIN — MIDAZOLAM 2 MG: 1 INJECTION INTRAMUSCULAR; INTRAVENOUS at 08:17

## 2023-01-01 RX ADMIN — PHENOBARBITAL SODIUM 130 MG: 65 INJECTION INTRAMUSCULAR at 09:08

## 2023-01-01 RX ADMIN — ONDANSETRON 4 MG: 2 INJECTION INTRAMUSCULAR; INTRAVENOUS at 21:42

## 2023-01-01 RX ADMIN — LORAZEPAM 2 MG: 2 INJECTION INTRAMUSCULAR; INTRAVENOUS at 16:11

## 2023-01-01 RX ADMIN — Medication: at 00:32

## 2023-01-01 RX ADMIN — PROCHLORPERAZINE EDISYLATE 10 MG: 5 INJECTION INTRAMUSCULAR; INTRAVENOUS at 18:50

## 2023-01-01 RX ADMIN — MIDAZOLAM 2 MG: 1 INJECTION INTRAMUSCULAR; INTRAVENOUS at 13:49

## 2023-01-01 RX ADMIN — HALOPERIDOL LACTATE 5 MG: 5 INJECTION, SOLUTION INTRAMUSCULAR at 00:11

## 2023-01-01 RX ADMIN — HALOPERIDOL LACTATE 5 MG: 5 INJECTION, SOLUTION INTRAMUSCULAR at 16:41

## 2023-01-01 RX ADMIN — LORAZEPAM 2 MG: 2 INJECTION INTRAMUSCULAR; INTRAVENOUS at 04:25

## 2023-01-01 RX ADMIN — HALOPERIDOL LACTATE 5 MG: 5 INJECTION, SOLUTION INTRAMUSCULAR at 19:54

## 2023-01-01 RX ADMIN — MIDAZOLAM 4 MG: 1 INJECTION INTRAMUSCULAR; INTRAVENOUS at 08:19

## 2023-01-01 RX ADMIN — SODIUM CHLORIDE 75 ML/HR: 900 INJECTION, SOLUTION INTRAVENOUS at 02:58

## 2023-01-01 RX ADMIN — LORAZEPAM 2 MG: 2 INJECTION INTRAMUSCULAR; INTRAVENOUS at 23:44

## 2023-01-01 RX ADMIN — HALOPERIDOL LACTATE 5 MG: 5 INJECTION, SOLUTION INTRAMUSCULAR at 09:19

## 2023-01-01 RX ADMIN — Medication: at 11:12

## 2023-01-01 RX ADMIN — GLYCOPYRROLATE 0.2 MG: 0.2 INJECTION INTRAMUSCULAR; INTRAVENOUS at 00:06

## 2023-01-01 RX ADMIN — Medication: at 10:20

## 2023-01-01 RX ADMIN — MIDAZOLAM 4 MG: 1 INJECTION INTRAMUSCULAR; INTRAVENOUS at 17:58

## 2023-01-01 RX ADMIN — Medication: at 22:08

## 2023-01-01 RX ADMIN — MIDAZOLAM 4 MG: 1 INJECTION INTRAMUSCULAR; INTRAVENOUS at 03:51

## 2023-01-01 RX ADMIN — PROCHLORPERAZINE EDISYLATE 10 MG: 5 INJECTION INTRAMUSCULAR; INTRAVENOUS at 23:46

## 2023-01-01 RX ADMIN — LORAZEPAM 2 MG: 2 INJECTION INTRAMUSCULAR; INTRAVENOUS at 16:46

## 2023-01-01 RX ADMIN — LORAZEPAM 2 MG: 2 INJECTION INTRAMUSCULAR; INTRAVENOUS at 20:08

## 2023-01-01 RX ADMIN — Medication: at 07:09

## 2023-01-01 RX ADMIN — HALOPERIDOL LACTATE 2 MG: 5 INJECTION, SOLUTION INTRAMUSCULAR at 04:19

## 2023-01-01 RX ADMIN — MORPHINE SULFATE 4 MG: 4 INJECTION INTRAVENOUS at 09:49

## 2023-01-01 RX ADMIN — GLYCOPYRROLATE 0.2 MG: 0.2 INJECTION INTRAMUSCULAR; INTRAVENOUS at 03:07

## 2023-01-01 RX ADMIN — PHENOBARBITAL SODIUM 130 MG: 65 INJECTION INTRAMUSCULAR at 22:00

## 2023-01-01 RX ADMIN — MIDAZOLAM HYDROCHLORIDE 2 MG: 1 INJECTION, SOLUTION INTRAMUSCULAR; INTRAVENOUS at 17:00

## 2023-01-01 RX ADMIN — MIDAZOLAM 4 MG: 1 INJECTION INTRAMUSCULAR; INTRAVENOUS at 22:29

## 2023-01-01 RX ADMIN — GLYCOPYRROLATE 0.2 MG: 0.2 INJECTION INTRAMUSCULAR; INTRAVENOUS at 08:17

## 2023-01-01 RX ADMIN — LORAZEPAM 2 MG: 2 INJECTION INTRAMUSCULAR; INTRAVENOUS at 23:51

## 2023-01-01 RX ADMIN — MIDAZOLAM HYDROCHLORIDE 4 MG: 1 INJECTION, SOLUTION INTRAMUSCULAR; INTRAVENOUS at 13:42

## 2023-01-01 RX ADMIN — MIDAZOLAM 4 MG: 1 INJECTION INTRAMUSCULAR; INTRAVENOUS at 19:38

## 2023-01-01 RX ADMIN — MIDAZOLAM 4 MG: 1 INJECTION INTRAMUSCULAR; INTRAVENOUS at 21:56

## 2023-01-01 RX ADMIN — HALOPERIDOL LACTATE 5 MG: 5 INJECTION, SOLUTION INTRAMUSCULAR at 08:30

## 2023-01-01 RX ADMIN — MIDAZOLAM HYDROCHLORIDE 2 MG: 1 INJECTION, SOLUTION INTRAMUSCULAR; INTRAVENOUS at 08:48

## 2023-01-01 RX ADMIN — MIDAZOLAM HYDROCHLORIDE 2 MG: 1 INJECTION, SOLUTION INTRAMUSCULAR; INTRAVENOUS at 09:55

## 2023-01-01 RX ADMIN — PROCHLORPERAZINE EDISYLATE 10 MG: 5 INJECTION INTRAMUSCULAR; INTRAVENOUS at 06:14

## 2023-01-01 RX ADMIN — PROCHLORPERAZINE EDISYLATE 10 MG: 5 INJECTION INTRAMUSCULAR; INTRAVENOUS at 17:57

## 2023-01-01 RX ADMIN — Medication: at 13:38

## 2023-01-01 RX ADMIN — MIDAZOLAM HYDROCHLORIDE 4 MG: 1 INJECTION, SOLUTION INTRAMUSCULAR; INTRAVENOUS at 05:00

## 2023-01-01 RX ADMIN — LORAZEPAM 2 MG: 2 INJECTION INTRAMUSCULAR; INTRAVENOUS at 04:20

## 2023-01-01 RX ADMIN — MORPHINE SULFATE 4 MG: 4 INJECTION INTRAVENOUS at 16:58

## 2023-01-01 RX ADMIN — Medication: at 08:45

## 2023-01-01 RX ADMIN — HALOPERIDOL LACTATE 5 MG: 5 INJECTION, SOLUTION INTRAMUSCULAR at 04:25

## 2023-01-01 RX ADMIN — GLYCOPYRROLATE 0.2 MG: 0.2 INJECTION INTRAMUSCULAR; INTRAVENOUS at 00:11

## 2023-01-01 RX ADMIN — PROCHLORPERAZINE EDISYLATE 10 MG: 5 INJECTION INTRAMUSCULAR; INTRAVENOUS at 11:19

## 2023-01-01 RX ADMIN — PROCHLORPERAZINE EDISYLATE 10 MG: 5 INJECTION INTRAMUSCULAR; INTRAVENOUS at 06:48

## 2023-01-01 RX ADMIN — ONDANSETRON 4 MG: 2 INJECTION INTRAMUSCULAR; INTRAVENOUS at 13:04

## 2023-01-01 RX ADMIN — MIDAZOLAM 4 MG: 1 INJECTION INTRAMUSCULAR; INTRAVENOUS at 15:03

## 2023-01-01 RX ADMIN — Medication: at 18:17

## 2023-01-01 RX ADMIN — HALOPERIDOL LACTATE 5 MG: 5 INJECTION, SOLUTION INTRAMUSCULAR at 16:17

## 2023-01-01 RX ADMIN — LORAZEPAM 2 MG: 2 INJECTION INTRAMUSCULAR; INTRAVENOUS at 10:50

## 2023-01-01 RX ADMIN — MIDAZOLAM HYDROCHLORIDE 2 MG: 1 INJECTION, SOLUTION INTRAMUSCULAR; INTRAVENOUS at 21:01

## 2023-01-01 RX ADMIN — KETOROLAC TROMETHAMINE 30 MG: 30 INJECTION, SOLUTION INTRAMUSCULAR at 20:49

## 2023-01-01 RX ADMIN — PHENOBARBITAL SODIUM 130 MG: 65 INJECTION INTRAMUSCULAR at 22:39

## 2023-01-01 RX ADMIN — PROCHLORPERAZINE EDISYLATE 10 MG: 5 INJECTION INTRAMUSCULAR; INTRAVENOUS at 02:30

## 2023-01-01 RX ADMIN — HALOPERIDOL LACTATE 2 MG: 5 INJECTION, SOLUTION INTRAMUSCULAR at 10:55

## 2023-01-01 RX ADMIN — PHENOBARBITAL SODIUM 130 MG: 65 INJECTION INTRAMUSCULAR at 22:29

## 2023-01-01 RX ADMIN — Medication: at 23:43

## 2023-01-01 RX ADMIN — PROCHLORPERAZINE EDISYLATE 10 MG: 5 INJECTION INTRAMUSCULAR; INTRAVENOUS at 23:47

## 2023-01-01 RX ADMIN — MORPHINE SULFATE 4 MG: 4 INJECTION INTRAVENOUS at 19:41

## 2023-01-01 RX ADMIN — Medication: at 08:43

## 2023-01-01 RX ADMIN — LORAZEPAM 2 MG: 2 INJECTION INTRAMUSCULAR; INTRAVENOUS at 20:07

## 2023-01-01 RX ADMIN — Medication: at 14:52

## 2023-01-01 RX ADMIN — HALOPERIDOL LACTATE 5 MG: 5 INJECTION, SOLUTION INTRAMUSCULAR at 03:59

## 2023-01-01 RX ADMIN — HALOPERIDOL LACTATE 5 MG: 5 INJECTION, SOLUTION INTRAMUSCULAR at 07:43

## 2023-01-01 RX ADMIN — BISACODYL 10 MG: 10 SUPPOSITORY RECTAL at 11:08

## 2023-01-01 RX ADMIN — MIDAZOLAM 4 MG: 1 INJECTION INTRAMUSCULAR; INTRAVENOUS at 00:01

## 2023-01-01 RX ADMIN — PROCHLORPERAZINE EDISYLATE 10 MG: 5 INJECTION INTRAMUSCULAR; INTRAVENOUS at 12:14

## 2023-01-01 RX ADMIN — ONDANSETRON 4 MG: 2 INJECTION INTRAMUSCULAR; INTRAVENOUS at 13:25

## 2023-01-01 RX ADMIN — ONDANSETRON 4 MG: 2 INJECTION INTRAMUSCULAR; INTRAVENOUS at 10:31

## 2023-01-01 RX ADMIN — Medication: at 11:10

## 2023-01-01 RX ADMIN — LORAZEPAM 2 MG: 2 INJECTION INTRAMUSCULAR; INTRAVENOUS at 04:04

## 2023-01-01 RX ADMIN — LORAZEPAM 2 MG: 2 INJECTION INTRAMUSCULAR; INTRAVENOUS at 19:56

## 2023-01-01 RX ADMIN — MIDAZOLAM 4 MG: 1 INJECTION INTRAMUSCULAR; INTRAVENOUS at 19:26

## 2023-01-01 RX ADMIN — MIDAZOLAM HYDROCHLORIDE 2 MG: 1 INJECTION, SOLUTION INTRAMUSCULAR; INTRAVENOUS at 09:43

## 2023-01-01 RX ADMIN — PROCHLORPERAZINE EDISYLATE 10 MG: 5 INJECTION INTRAMUSCULAR; INTRAVENOUS at 18:04

## 2023-01-01 RX ADMIN — Medication: at 03:17

## 2023-01-01 RX ADMIN — PROCHLORPERAZINE EDISYLATE 10 MG: 5 INJECTION INTRAMUSCULAR; INTRAVENOUS at 19:00

## 2023-01-01 RX ADMIN — PROCHLORPERAZINE EDISYLATE 10 MG: 5 INJECTION INTRAMUSCULAR; INTRAVENOUS at 12:18

## 2023-01-01 RX ADMIN — PROCHLORPERAZINE EDISYLATE 10 MG: 5 INJECTION INTRAMUSCULAR; INTRAVENOUS at 11:46

## 2023-01-01 RX ADMIN — MIDAZOLAM HYDROCHLORIDE 2 MG: 1 INJECTION, SOLUTION INTRAMUSCULAR; INTRAVENOUS at 17:14

## 2023-01-01 RX ADMIN — ONDANSETRON 4 MG: 2 INJECTION INTRAMUSCULAR; INTRAVENOUS at 15:28

## 2023-01-01 RX ADMIN — LORAZEPAM 2 MG: 2 INJECTION INTRAMUSCULAR; INTRAVENOUS at 00:00

## 2023-01-01 RX ADMIN — PHENOBARBITAL SODIUM 130 MG: 65 INJECTION INTRAMUSCULAR at 22:08

## 2023-01-01 RX ADMIN — GLYCOPYRROLATE 0.2 MG: 0.2 INJECTION INTRAMUSCULAR; INTRAVENOUS at 12:13

## 2023-01-01 RX ADMIN — Medication: at 18:10

## 2023-01-01 RX ADMIN — GLYCOPYRROLATE 0.2 MG: 0.2 INJECTION INTRAMUSCULAR; INTRAVENOUS at 22:04

## 2023-01-01 RX ADMIN — Medication: at 14:44

## 2023-01-01 RX ADMIN — MIDAZOLAM 4 MG: 1 INJECTION INTRAMUSCULAR; INTRAVENOUS at 22:02

## 2023-01-01 RX ADMIN — GLYCOPYRROLATE 0.2 MG: 0.2 INJECTION INTRAMUSCULAR; INTRAVENOUS at 05:34

## 2023-01-01 RX ADMIN — PHENOBARBITAL SODIUM 130 MG: 65 INJECTION INTRAMUSCULAR at 16:01

## 2023-01-01 RX ADMIN — PROCHLORPERAZINE EDISYLATE 10 MG: 5 INJECTION INTRAMUSCULAR; INTRAVENOUS at 00:09

## 2023-01-01 RX ADMIN — PROCHLORPERAZINE EDISYLATE 10 MG: 5 INJECTION INTRAMUSCULAR; INTRAVENOUS at 05:42

## 2023-01-01 RX ADMIN — MIDAZOLAM 4 MG: 1 INJECTION INTRAMUSCULAR; INTRAVENOUS at 01:50

## 2023-01-01 RX ADMIN — PROCHLORPERAZINE EDISYLATE 10 MG: 5 INJECTION INTRAMUSCULAR; INTRAVENOUS at 06:18

## 2023-01-01 RX ADMIN — MIDAZOLAM 4 MG: 1 INJECTION INTRAMUSCULAR; INTRAVENOUS at 01:57

## 2023-01-01 RX ADMIN — MIDAZOLAM 4 MG: 1 INJECTION INTRAMUSCULAR; INTRAVENOUS at 04:16

## 2023-01-01 RX ADMIN — Medication: at 16:47

## 2023-01-01 RX ADMIN — MIDAZOLAM HYDROCHLORIDE 2 MG: 1 INJECTION, SOLUTION INTRAMUSCULAR; INTRAVENOUS at 05:02

## 2023-01-01 RX ADMIN — MIDAZOLAM 4 MG: 1 INJECTION INTRAMUSCULAR; INTRAVENOUS at 20:09

## 2023-01-01 RX ADMIN — PHENOBARBITAL SODIUM 130 MG: 65 INJECTION INTRAMUSCULAR at 08:19

## 2023-01-01 RX ADMIN — PHENOBARBITAL SODIUM 130 MG: 65 INJECTION INTRAMUSCULAR at 15:57

## 2023-01-01 RX ADMIN — PHENOBARBITAL SODIUM 130 MG: 65 INJECTION INTRAMUSCULAR at 22:33

## 2023-01-01 RX ADMIN — HALOPERIDOL LACTATE 5 MG: 5 INJECTION, SOLUTION INTRAMUSCULAR at 12:13

## 2023-03-05 PROBLEM — C56.9 OVARIAN CANCER (HCC): Status: ACTIVE | Noted: 2023-01-01

## 2023-03-05 NOTE — H&P
[address] [facility] Help to Those in Need  [phone]    Patient Name: Medina Gómez  YOB: 1955    Date of Provider Hospice Visit: 03/05/23    Level of Care:   [x] General Inpatient (GIP)    [] Routine   [] Respite    Current Location of Care:  [] [facility] [] [facility] [] [facility] [] [facility] [x] Hospice House THE [facility], patient referred from:  [] [facility] [] [facility] [] [facility] [] [facility] [] Home [x] Other:     Date of Original Hospice Admission: 3/5/2023  Hospice Medical Director at time of admission: Dr. Diego Crum Diagnosis: Stage IV ovarian cancer  Diagnoses RELATED to the terminal prognosis: nausea, vomiting, constipation, abdominal pain, carcinomatosis, hx TIA 2018  Other Diagnoses: depression, hypothyroidism      HOSPICE SUMMARY   Do not cut and paste chart information other than imaging findings    Medina Gómez is a 78y.o. year old who was admitted to [facility]. She has extensive history of ovarian cancer treatments beginning in January of 2020. S/p open debulking on 4/28/2020, long term chemo. Last given in July 2020 Patient had a CT scan 4/17/21 with multiple intraperitoneal nodules new from previous scan. The patient's principle diagnosis has resulted in ongoing abdominal pain, now with nausea that is difficult to control. She has been vomiting with intake of food or pills, only keeping down sips of water and tea this week. Refer to LCD     Functionally, the patient's Karnofsky and/or Palliative Performance Scale has declined over a period of months and is estimated at 40. The patient is dependent on the following ADLs:  She requires assistance for bathing    Objective information that support this patients limited prognosis includes: progression of disease by CT scan, unable to tolerate more chemo. Decline in PO intake, dysgeusia, anorexia, ongoing nausea. , uncontrolled abdominal pain    The patient/family chose comfort measures with the support of Hospice. HOSPICE DIAGNOSES   Active Symptoms:  1. Nausea and vomiting.  - not improved with home regimen  2. Constipation, difficulty managing at home  3. Cancer related abdominal pain, and unable to keep down liquid morphine at home  4. Stage IV ovarian cancer with peritoneal carcinomatosis     PLAN   Patient needs GIP level of care to get nausea, vomiting, pain and constipation under control. Admit to hospice house for symptom management. Goal to return home once symptoms controlled. Suspect that patient has slow gut transit due to carcinomatosis. She does not appear by exam to be obstructed (abdomen soft/ active bowel sounds, and is not actively vomiting unless trying to take PO meds/ food)  Will try gentle hydration tonight with 1L Normal saline at 75/hr for hydration  For pain will continue fentanyl patch 25mcg/hr and add morphine 4mg IV Q1hr PRN pain. Encouraged patient to use PRN morphine. (She had liquid morphine at home but could not keep it down)   May need to make adjustments tomorrow to fentanyl patch based on PRN morphine use. Schedule for nausea Zofran 4mg IV Q8hrs and compazine 10mg IV q6hr PRN nausea not controlled by zofran  Start decadron 2mg PO Qam tomorrow morning if she can keep it down  Will resume bowel regimen tomorrow once nausea under control   Senokot 2 tabs BID, consider adding enema     and SW to support family needs  Disposition: return home when symptoms improved  Hospice Plan of care was reviewed in detail and agree with current plan of care    Discussed with bedside nurse and also with [facility] hospice RN  Prognosis estimated based on 03/05/23 clinical assessment is:   [] Hours to Days    [x] Days to Weeks    [] Other:    Communicated plan of care with: Hospice Case Manager;  Hospice IDT; Care Team     GOALS OF CARE     Patient/Medical POA stated Goal of Care: goal of care is comfort     [x] I have reviewed and/or updated ACP information in the Advance Care Planning Navigator. This information is available in the [address] link in the patient's chart header. Primary Decision [facility] Agent):   Primary Decision Maker: Adolfo Roldan - Daughter - [phone]    Secondary Decision Maker: Marleni Hanson - Brother - [phone]    Resuscitation Status: DNR  If DNR is there a Durable DNR on file? : [x] Yes [] No (If no, complete Durable DNR)    HISTORY     History obtained from: patient, hospice nurse    CHIEF COMPLAINT: nausea, dysgeusia, vomiting, poor PO intake, constipation  The patient is:   [x] Verbal  [] Nonverbal  [] Unresponsive    HPI/SUBJECTIVE:    78year old female at home supported by [employer]. She has about one week of difficult to control symptoms. Fentanyl patch and liquid morphine for pain has not been effective as she is not able to keep the morphine down. The patch was changed to Q48 hrs when it was noted that the 3rd day was when she had more pain. She has been using enemas and senokot (2 tabs BID) but still has not had a BM in the past 5 days (since Tuesday)  She is vomiting up meds or food but is able to keep down sips of water and tea.    Pain is 4/10 right now across her abdomen, it is worse with ambulation  No real change in distention, some slight bloating since summertime       REVIEW OF SYSTEMS     The following systems were: [x] reviewed  [] unable to be reviewed    Positive ROS include:  Constitutional: fatigue, weakness, in pain especially with movement  Ears/nose/mouth/throat:   Respiratory:  Gastrointestinal:poor appetite, nausea, vomiting, abdominal pain, constipation - last BM Tuesday  Everything tastes horrible  Musculoskeletal:pain,   Neurologic:poor sleep, not more than 1-2 hours per night  Adult Non-Verbal Pain Assessment Score: not applicable, pt is verbal and rates pain as 4/10    Face  [] 0   No particular expression or smile  [] 1   Occasional grimace, tearing, frowning, wrinkled forehead  [] 2 Frequent grimace, tearing, frowning, wrinkled forehead    Activity (movement)  [] 0   Lying quietly, normal position  [] 1   Seeking attention through movement or slow, cautious movement  [] 2   Restless, excessive activity and/or withdrawal reflexes    Guarding  [] 0   Lying quietly, no positioning of hands over areas of body  [] 1   Splinting areas of the body, tense  [] 2   Rigid, stiff    Physiology (vital signs)  [] 0   Stable vital signs  [] 1   Change in any of the following: SBP > 20mm Hg; HR > 20/minute  [] 2   Change in any of the following: SBP > 30mm Hg; HR > 25/minute    Respiratory  [] 0   Baseline RR/SpO2, compliant with ventilator  [] 1   RR > 10 above baseline, or 5% drop SpO2, mild asynchrony with ventilator  [] 2   RR > 20 above baseline, or 10% drop SpO2, asynchrony with ventilator     FUNCTIONAL ASSESSMENT     Palliative Performance Scale (PPS):50%       PSYCHOSOCIAL/SPIRITUAL ASSESSMENT     Active Problems:    Ovarian cancer ([facility] [address] (3/5/2023)      Past Medical History:   Diagnosis Date    Anxiety     Hypotension     Thyroid disease       No past surgical history on file. Social History     Tobacco Use    Smoking status: Not on file    Smokeless tobacco: Not on file   Substance Use Topics    Alcohol use: Not on file     No family history on file. Allergies   Allergen Reactions    Sulfa (Sulfonamide Antibiotics) Diarrhea and Nausea and Vomiting      Current Facility-Administered Medications   Medication Dose Route Frequency    prochlorperazine (COMPAZINE) injection 10 mg  10 mg IntraVENous Q6H PRN    ondansetron (ZOFRAN) injection 4 mg  4 mg IntraVENous Q8H    fentaNYL (DURAGESIC) 25 mcg/hr patch 1 Patch  1 Patch TransDERmal Q48H        PHYSICAL EXAM     Wt Readings from Last 3 Encounters:   12/08/22 66.7 kg (147 lb)   01/04/18 63.5 kg (140 lb)       There were no vitals taken for this visit.     Supplemental O2  [] Yes  [] NO  Last bowel movement: Tuesday     Currently this patient has:  [] Peripheral IV [] PICC  [x] PORT [] ICD    [] Sigala Catheter [] NG Tube   [] PEG Tube    [] Rectal Tube [] Drain  [] Other:     Constitutional: pleasant female, lying in bed NAD  Eyes: anicteric, EOMI  ENMT:   Cardiovascular: reg s1s2  Respiratory: clear anteriorly  Gastrointestinal: abdomen is soft w slight distention, active BS, well healed midline scar  Musculoskeletal:nl  Skin:intact  Neurologic:speech fluent, insight intact  Psychiatric: normal affect  Other:       Pertinent Lab and or Imaging Tests:  Lab Results   Component Value Date/Time    Sodium 140 01/03/2018 11:59 AM    Potassium 3.7 01/03/2018 11:59 AM    Chloride 105 01/03/2018 11:59 AM    CO2 28 01/03/2018 11:59 AM    Anion gap 7 01/03/2018 11:59 AM    Glucose 96 01/03/2018 11:59 AM    BUN 15 01/03/2018 11:59 AM    Creatinine 0.80 01/03/2018 11:59 AM    BUN/Creatinine ratio 19 01/03/2018 11:59 AM    GFR est AA >60 01/03/2018 11:59 AM    GFR est non-AA >60 01/03/2018 11:59 AM    Calcium 8.9 01/03/2018 11:59 AM     Lab Results   Component Value Date/Time    Protein, total 7.6 01/03/2018 11:59 AM    Albumin 3.9 01/03/2018 11:59 AM           Total time:   Counseling / coordination time:   > 50% counseling / coordination?:

## 2023-03-05 NOTE — PROGRESS NOTES
Problem: Nausea/Vomiting (Adult)  Goal: *Absence of nausea/vomiting  Outcome: Progressing Towards Goal  Goal: *Palliation of nausea/vomiting (Palliative Care)  Outcome: Progressing Towards Goal     Problem: Patient Education: Go to Patient Education Activity  Goal: Patient/Family Education  Outcome: Progressing Towards Goal     Problem: Breathing Pattern - Ineffective  Goal: *Use of effective breathing techniques  Outcome: Progressing Towards Goal     Problem: Pain  Goal: *Control of acute pain  Outcome: Progressing Towards Goal     Problem: Pressure Injury - Risk of  Goal: *Prevention of pressure injury  Outcome: Progressing Towards Goal  Note: Pressure Injury Interventions:                                            Problem: Grieving  Goal: *Able to express feelings of grief  Outcome: Progressing Towards Goal  Goal: *Able to identify stages of grieving process  Outcome: Progressing Towards Goal     Problem: Mood - Altered  Goal: *Alleviation of anxiety and depressive symptoms  Outcome: Progressing Towards Goal     Problem: Discharge Planning  Goal: *Participates in discharge planning  Outcome: Progressing Towards Goal     Problem: Patient Education: Go to Patient Education Activity  Goal: Patient/Family Education  Outcome: Progressing Towards Goal

## 2023-03-06 NOTE — PROGRESS NOTES
1905-report received from [facility].    2000-pt called for assistance to the bathroom. Pt ambulated to bathroom with steady gait and standby assist. No dyspnea noted. Pt expressed feeling light headed upon standing. Pt voided. Pt AxOx4. Pt requested ice chips. 2018-pt expressed feeling abdominal pain 5/10. Pt also expressed feeling nausea and a mild headache. Administered PRN IV morphine and compazine. Pt with even unlabored  respirations and clear, but diminished lung sounds. Abdomen soft, bowels active. Pt expressed abdominal tenderness with touch. Pt expressed being unable to have a bowel movement since 2/28 and only passes gas every other day. Pt only able to take sips. Poor appetite due to nausea. 2118-pt sleeping with even unlabored respirations. Muscles relaxed. No facial grimacing or furrowed brow. 2218-pt resting quietly with unlabored respirations. 2254-pt asleep and was very startled when upon my entry into the room. Pt mentioned PTSD that causes her to be startled when awakened from her sleep. Pt states pain has improved. 2/10. Pt ambulated to bathroom. Gait steady. Pt expressed feeling dizzy. Administered scheduled IV zofran. 2359-pt sleeping with unlabored respirations and relaxed facial expression. 0025-pt sleeping with unlabored respirations. 0120-pt sleeping with unlabored respirations. No furrowed brow or facial grimacing.    0225-pt sleeping with unlabored respirations. 0303-pt awake. Pt  asked to go to bathroom. Pt ambulated to bathroom with steady gait and standby assist. Pt voided. Pt expressed feeling more abdominal pressure and pain 5/10. Pt denied nausea. Administered PRN IV morphine and started a new bag of fluids. Pt requested more ice chips. 0420-pt resting quietly with unlabored respirations. Pts right arm over her head. Neutral facial expression. 0525-pt awake. Pt appears sad, but denies any need at this time. Pt appreciative of care. 0616-pt awake.  Pt expressed feeling nausea. Administered scheduled IV zofran. Pt ambulated to bathroom to void. Steady gait. Pt did feel dizzy and had to sit back down momentarily before ambulating again. Pt denies increase in pain. 0700-report given to oncoming nurse. NAME OF PATIENT:  Ian Recinos    LEVEL OF CARE:  [facility]    REASON FOR GIP:   Pain, despite numerous changes in medications, Nausea and vomiting, despite changes to medications, Medication adjustment that must be monitored 24/7, and Stabilizing treatment that cannot take place at home    *PATIENT REMAINS ELIGIBLE FOR GIP LEVEL OF CARE AS EVIDENCED BY: (MUST BE ADDRESSED OF PATIENT GIP)      REASON FOR RESPITE:  N/A    O2 SAFETY:  N/A    FALL INTERVENTIONS PROVIDED:   Implemented/recommended use of non-skid footwear, Implemented/recommended use of fall risk identification flag to all team members, Implemented/recommended assistive devices and encouraged their use, Implemented/recommended resources for alarm system (personal alarm, bed alarm, call bell, etc.) , and Implemented/recommended environmental changes (remove hazards, lower bed, improve lighting, etc.)    INTERDISPLINARY COMMUNICATION/COLLABORATION:  Physician, IGLESIA, Donovan Chacon, and RN, CNA    NEW MEDICATION INITIATION DOCUMENTATION:  N/A    Reason medication is being initiated:  N/A    MD / Provider name consulted re: change in status / initiation of new medication:  N/A    New Symptom(s):  N/A    New Order(s):  N/A    Name of the person notified of the changes:  N/A    Name of person being taught:  N/A    Instructions given:  N/A    Side Effects taught:  N/A    Response to teaching:  N/A      COMFORTABLE DYING MEASURE:  Is Patient/family satisfied with symptom level?  no    DISCHARGE PLAN:  pt to return home with hospice once symptoms are managed.

## 2023-03-06 NOTE — PROGRESS NOTES
[address] [facility] to Those in Need  [phone]    Patient Name: Pierre Workman  YOB: 1955    Date of Provider Hospice Visit: 03/06/23    Level of Care:   [x] General Inpatient (GIP)    [] Routine   [] Respite    Current Location of Care:  [] [facility] [] [facility] [] [id number] [] [facility] [x] Hospice House THE [facility], patient referred from:  [] [facility] [] [facility] [] [address] [facility] [] [facility] [] Home [x] Other:     Date of Original Hospice Admission: 3/5/2023  Hospice Medical Director at time of admission: Dr. Whitney Jean Diagnosis: Stage IV ovarian cancer  Diagnoses RELATED to the terminal prognosis: nausea, vomiting, constipation, abdominal pain, carcinomatosis, hx TIA 2018  Other Diagnoses: depression, hypothyroidism      HOSPICE SUMMARY   Do not cut and paste chart information other than imaging findings    Pierre Workman is a 78y.o. year old who was admitted to [facility]. She has extensive history of ovarian cancer treatments beginning in January of 2020. S/p open debulking on 4/28/2020, long term chemo. Last given in July 2020 Patient had a CT scan 4/17/21 with multiple intraperitoneal nodules new from previous scan. The patient's principle diagnosis has resulted in ongoing abdominal pain, now with nausea that is difficult to control. She has been vomiting with intake of food or pills, only keeping down sips of water and tea this week. Refer to LCD     Functionally, the patient's Karnofsky and/or Palliative Performance Scale has declined over a period of months and is estimated at 40. The patient is dependent on the following ADLs:  She requires assistance for bathing    Objective information that support this patients limited prognosis includes: progression of disease by CT scan, unable to tolerate more chemo. Decline in PO intake, dysgeusia, anorexia, ongoing nausea. , uncontrolled abdominal pain    The patient/family chose comfort measures with the support of Hospice. HOSPICE DIAGNOSES   Active Symptoms:  1. Nausea and vomiting.  - not improved with home regimen  2. Constipation, difficulty managing at home  3. Cancer related abdominal pain, and unable to keep down liquid morphine at home  4. Stage IV ovarian cancer with peritoneal carcinomatosis     PLAN   Patient will continue [facility] level care as patient needs frequent nursing assessments, IV medication management-not tolerating anything orally at home and pain just not managed. Patient could not take any oral medication for pain management nor was she tolerating diet except for sips of water. She had tried IV Dilaudid postoperatively several years ago and did have significant amount of nausea and vomiting afterwards-hard to know if this was the Dilaudid or just postop issues. Morphine IV has helped her pain but did give her a little bit of a headache afterwards. .   Pain management-reviewed the chart from the weekend and difficult situation. Agree that patient likely has slow transit/bowel dysfunction related to her carcinomatosis even if its not completely obstructed, it is acting as if that is the case. She has struggled with constipation but cannot tolerate oral medication to help. Fentanyl transdermal remains on 25 mcg every 48 hours. Patient has received 3 as needed doses of morphine.   My suspicion is patient would be best served by a PCA for pain management given her bowel issues but we will see how IV medication is effective, and can consider either PCA of morphine or fentanyl-depending what she is tolerating the best.  We are checking with [facility] hospice to see if they could manage a PCA in the home setting  Nausea and vomiting-continue scheduled Zofran for now, could consider IV Decadron as she is not tolerating oral medication  Constipation-attempt suppository and make sure no evidence of impaction     and SW to support family needs  Disposition: return home when symptoms improved  Hospice Plan of care was reviewed in detail and agree with current plan of care    Discussed with bedside nurse and also with [facility] hospice RN  Prognosis estimated based on 03/06/23 clinical assessment is:   [] Hours to Days    [x] Days to Weeks    [] Other:    Communicated plan of care with: Hospice Case Manager; Hospice IDT; Care Team     GOALS OF CARE     Patient/Medical POA stated Goal of Care: goal of care is comfort     [x] I have reviewed and/or updated ACP information in the Advance Care Planning Navigator. This information is available in the [address] link in the patient's chart header. Primary Decision [facility] Agent):   Primary Decision Maker: Shaan Faustin - Daughter - [phone]    Secondary Decision Maker: Suha Riley - Brother - [phone]    Resuscitation Status: DNR  If DNR is there a Durable DNR on file? : [x] Yes [] No (If no, complete Durable DNR)    HISTORY     History obtained from: patient, hospice nurse    CHIEF COMPLAINT: nausea, dysgeusia, vomiting, poor PO intake, constipation  The patient is:   [x] Verbal  [] Nonverbal  [] Unresponsive    HPI/SUBJECTIVE:    78year old female at home supported by [employer]. She has about one week of difficult to control symptoms. Fentanyl patch and liquid morphine for pain has not been effective as she is not able to keep the morphine down. The patch was changed to Q48 hrs when it was noted that the 3rd day was when she had more pain. She has been using enemas and senokot (2 tabs BID) but still has not had a BM in the past 5 days (since Tuesday)  She is vomiting up meds or food but is able to keep down sips of water and tea. Pain is 4/10 right now across her abdomen, it is worse with ambulation  No real change in distention, some slight bloating since summertime    3/6-continues to describe diffuse pain in her abdomen. Even with just palpation of my stethoscope cause discomfort.   Continuing not eating or drinking well       REVIEW OF SYSTEMS     The following systems were: [x] reviewed  [] unable to be reviewed    Positive ROS include:  Constitutional: fatigue, weakness, in pain especially with movement  Ears/nose/mouth/throat:   Respiratory:  Gastrointestinal:poor appetite, nausea, vomiting, abdominal pain, constipation - last BM Tuesday  Everything tastes horrible  Musculoskeletal:pain,   Neurologic:poor sleep, not more than 1-2 hours per night  Adult Non-Verbal Pain Assessment Score: not applicable, pt is verbal and rates pain as 4-6 out of 10    Face  [] 0   No particular expression or smile  [] 1   Occasional grimace, tearing, frowning, wrinkled forehead  [] 2   Frequent grimace, tearing, frowning, wrinkled forehead    Activity (movement)  [] 0   Lying quietly, normal position  [] 1   Seeking attention through movement or slow, cautious movement  [] 2   Restless, excessive activity and/or withdrawal reflexes    Guarding  [] 0   Lying quietly, no positioning of hands over areas of body  [] 1   Splinting areas of the body, tense  [] 2   Rigid, stiff    Physiology (vital signs)  [] 0   Stable vital signs  [] 1   Change in any of the following: SBP > 20mm Hg; HR > 20/minute  [] 2   Change in any of the following: SBP > 30mm Hg; HR > 25/minute    Respiratory  [] 0   Baseline RR/SpO2, compliant with ventilator  [] 1   RR > 10 above baseline, or 5% drop SpO2, mild asynchrony with ventilator  [] 2   RR > 20 above baseline, or 10% drop SpO2, asynchrony with ventilator     FUNCTIONAL ASSESSMENT     Palliative Performance Scale (PPS):50%       PSYCHOSOCIAL/SPIRITUAL ASSESSMENT     Active Problems:    Ovarian cancer ([facility] [address] (3/5/2023)    Past Medical History:   Diagnosis Date    Anxiety     Hypotension     Thyroid disease       No past surgical history on file.    Social History     Tobacco Use    Smoking status: Not on file    Smokeless tobacco: Not on file   Substance Use Topics    Alcohol use: Not on file     No family history on file.   Allergies   Allergen Reactions    Sulfa (Sulfonamide Antibiotics) Diarrhea and Nausea and Vomiting      Current Facility-Administered Medications   Medication Dose Route Frequency    bisacodyL (DULCOLAX) suppository 10 mg  10 mg Rectal DAILY PRN    prochlorperazine (COMPAZINE) injection 10 mg  10 mg IntraVENous Q6H PRN    ondansetron (ZOFRAN) injection 4 mg  4 mg IntraVENous Q8H    glycopyrrolate (ROBINUL) injection 0.2 mg  0.2 mg IntraVENous Q4H PRN    dexAMETHasone (DECADRON) tablet 2 mg (Patient Supplied)  2 mg Oral DAILY    morphine injection 4 mg  4 mg IntraVENous Q1H PRN    senna-docusate (PERICOLACE) 8.6-50 mg per tablet 2 Tablet (Patient Supplied)  2 Tablet Oral BID    [START ON 3/7/2023] fentaNYL (DURAGESIC) 25 mcg/hr patch 1 Patch (Patient Supplied)  1 Patch TransDERmal Q48H        PHYSICAL EXAM     Wt Readings from Last 3 Encounters:   12/08/22 66.7 kg (147 lb)   01/04/18 63.5 kg (140 lb)       Visit Vitals  BP (!) 105/58   Pulse 87   Temp 98.8 °F (37.1 °C)   Resp 18   SpO2 90%       Supplemental O2  [] Yes  [] NO  Last bowel movement: Tuesday     Currently this patient has:  [] Peripheral IV [] PICC  [x] PORT [] ICD    [] Sigala Catheter [] NG Tube   [] PEG Tube    [] Rectal Tube [] Drain  [] Other:     Constitutional: pleasant female, lying in bed NAD, alert and oriented  Eyes: anicteric, EOMI  ENMT:   Cardiovascular: reg s1s2  Respiratory: clear anteriorly  Gastrointestinal: abdomen is mildly distended, diffusely tender to palpation, does have bowel sounds but definitely more hypoactive sounds  Musculoskeletal:nl  Skin:intact  Neurologic:speech fluent, insight intact  Psychiatric: normal affect  Other:       Pertinent Lab and or Imaging Tests:  Lab Results   Component Value Date/Time    Sodium 140 01/03/2018 11:59 AM    Potassium 3.7 01/03/2018 11:59 AM    Chloride 105 01/03/2018 11:59 AM    CO2 28 01/03/2018 11:59 AM    Anion gap 7 01/03/2018 11:59 AM    Glucose 96 01/03/2018 11:59 AM    BUN 15 01/03/2018 11:59 AM    Creatinine 0.80 01/03/2018 11:59 AM    BUN/Creatinine ratio 19 01/03/2018 11:59 AM    GFR est AA >60 01/03/2018 11:59 AM    GFR est non-AA >60 01/03/2018 11:59 AM    Calcium 8.9 01/03/2018 11:59 AM     Lab Results   Component Value Date/Time    Protein, total 7.6 01/03/2018 11:59 AM    Albumin 3.9 01/03/2018 11:59 AM           Total time:   Counseling / coordination time:   > 50% counseling / coordination?:

## 2023-03-06 NOTE — PROGRESS NOTES
Problem: Nausea/Vomiting (Adult)  Goal: *Absence of nausea/vomiting  Outcome: Progressing Towards Goal  Goal: *Palliation of nausea/vomiting (Palliative Care)  Outcome: Progressing Towards Goal     Problem: Patient Education: Go to Patient Education Activity  Goal: Patient/Family Education  Outcome: Progressing Towards Goal     Problem: Breathing Pattern - Ineffective  Goal: *Use of effective breathing techniques  Outcome: Progressing Towards Goal     Problem: Pain  Goal: *Control of acute pain  Outcome: Progressing Towards Goal     Problem: Pressure Injury - Risk of  Goal: *Prevention of pressure injury  Outcome: Progressing Towards Goal  Note: Pressure Injury Interventions: Activity Interventions: Pressure redistribution bed/mattress(bed type)    Mobility Interventions: Pressure redistribution bed/mattress (bed type)    Nutrition Interventions: Offer support with meals,snacks and hydration                     Problem: Grieving  Goal: *Able to express feelings of grief  Outcome: Progressing Towards Goal  Goal: *Able to identify stages of grieving process  Outcome: Progressing Towards Goal     Problem: Mood - Altered  Goal: *Alleviation of anxiety and depressive symptoms  Outcome: Progressing Towards Goal     Problem: Discharge Planning  Goal: *Participates in discharge planning  Outcome: Progressing Towards Goal     Problem: Patient Education: Go to Patient Education Activity  Goal: Patient/Family Education  Outcome: Progressing Towards Goal     Problem: Pressure Injury - Risk of  Goal: *Prevention of pressure injury  Description: Document Markie Scale and appropriate interventions in the flowsheet. Outcome: Progressing Towards Goal  Note: Pressure Injury Interventions:             Activity Interventions: Pressure redistribution bed/mattress(bed type)    Mobility Interventions: Pressure redistribution bed/mattress (bed type)    Nutrition Interventions: Offer support with meals,snacks and hydration Problem: Patient Education: Go to Patient Education Activity  Goal: Patient/Family Education  Outcome: Progressing Towards Goal     Problem: Grieving  Goal: *Able to express feelings of grief  Outcome: Progressing Towards Goal     Problem: Grieving  Goal: *Able to identify stages of grieving process  Outcome: Progressing Towards Goal     Problem: Mood - Altered  Goal: *Alleviation of anxiety and depressive symptoms  Outcome: Progressing Towards Goal     Problem: Discharge Planning  Goal: *Participates in discharge planning  Outcome: Progressing Towards Goal     Problem: Patient Education: Go to Patient Education Activity  Goal: Patient/Family Education  Outcome: Progressing Towards Goal

## 2023-03-06 NOTE — PROGRESS NOTES
Problem: Nausea/Vomiting (Adult)  Goal: *Absence of nausea/vomiting  Outcome: Progressing Towards Goal  Goal: *Palliation of nausea/vomiting (Palliative Care)  Outcome: Progressing Towards Goal     Problem: Patient Education: Go to Patient Education Activity  Goal: Patient/Family Education  Outcome: Progressing Towards Goal     Problem: Breathing Pattern - Ineffective  Goal: *Use of effective breathing techniques  Outcome: Progressing Towards Goal     Problem: Pain  Goal: *Control of acute pain  Outcome: Progressing Towards Goal     Problem: Pressure Injury - Risk of  Goal: *Prevention of pressure injury  Outcome: Progressing Towards Goal  Note: Pressure Injury Interventions:                                            Problem: Grieving  Goal: *Able to express feelings of grief  Outcome: Progressing Towards Goal  Goal: *Able to identify stages of grieving process  Outcome: Progressing Towards Goal     Problem: Mood - Altered  Goal: *Alleviation of anxiety and depressive symptoms  Outcome: Progressing Towards Goal     Problem: Discharge Planning  Goal: *Participates in discharge planning  Outcome: Progressing Towards Goal     Problem: Pressure Injury - Risk of  Goal: *Prevention of pressure injury  Description: Document Markie Scale and appropriate interventions in the flowsheet.   Outcome: Progressing Towards Goal  Note: Pressure Injury Interventions:

## 2023-03-06 NOTE — HOSPICE
0700 Report received from [address].  9222 Patient is sleeping with relaxed face. 0412 Patient is resting bed with relaxed face, refused scheduled medications because of nausea. 2590 Patient is sitting up in bed states pain level is 8. PRN dose of morphine 4 mg IV given. 1030 Patient is in bed face relaxed, patient states pain level is a 2.  1105 Prn dose of Dulcolax suppository given. 1200 Patient is lying in bed with eyes closed, face is relaxed. 1300 Patient is in bed face is relaxed with visitor at bedside. 1320 Patient called out to go to the restroom and stated that her pain level is a 7. PRN dose of morphine 4 mg IV and scheduled dose of zofran 4 mg given. 1400 Patient is resting with eyes closed face is relaxed. 1500 Patient sleeping with relaxed face. 1600 Patient is resting in bed eyes closed. 1700 Patient called out to go to the restroom. Scheduled medication of morphine 4 mg and prn dose compazine IV for nausea given. 1800 Patient is resting bed with relaxed face. 1900 Report given. NAME OF PATIENT:  Fito Sethi    LEVEL OF CARE:  GIP    REASON FOR GIP:   Pain, despite numerous changes in medications and Nausea and vomiting, despite changes to medications    *PATIENT REMAINS ELIGIBLE FOR GIP LEVEL OF CARE AS EVIDENCED BY: (MUST BE ADDRESSED OF PATIENT GIP)  Patient is receiving IV medications and needs frequent nursing assessments.     REASON FOR RESPITE:  NA    O2 SAFETY:  NA    FALL INTERVENTIONS PROVIDED:   Implemented/recommended use of non-skid footwear, Implemented/recommended use of fall risk identification flag to all team members, Implemented/recommended assistive devices and encouraged their use, Implemented/recommended resources for alarm system (personal alarm, bed alarm, call bell, etc.) , Implemented/recommended environmental changes (remove hazards, lower bed, improve lighting, etc.), and Implemented/recommended increased supervision/assistance    INTERDISPLINARY COMMUNICATION/COLLABORATION:  Physician and RN, CNA    NEW MEDICATION INITIATION DOCUMENTATION:  NA    Reason medication is being initiated:  NA    MD / Provider name consulted re: change in status / initiation of new medication:  NA    New Symptom(s):  NA    New Order(s):  NA    Name of the person notified of the changes:  NA    Name of person being taught:  NA    Instructions given:  NA    Side Effects taught:  NA    Response to teaching:  NA      COMFORTABLE DYING MEASURE:  Is Patient/family satisfied with symptom level?  yes    DISCHARGE PLAN: Home when symptoms are controlled.

## 2023-03-07 NOTE — PROGRESS NOTES
Problem: Nausea/Vomiting (Adult)  Goal: *Absence of nausea/vomiting  Outcome: Progressing Towards Goal  Goal: *Palliation of nausea/vomiting (Palliative Care)  Outcome: Progressing Towards Goal     Problem: Patient Education: Go to Patient Education Activity  Goal: Patient/Family Education  Outcome: Progressing Towards Goal     Problem: Pain  Goal: *Control of acute pain  Outcome: Progressing Towards Goal     Problem: Pressure Injury - Risk of  Goal: *Prevention of pressure injury  Outcome: Progressing Towards Goal  Note: Pressure Injury Interventions: Activity Interventions: Pressure redistribution bed/mattress(bed type)    Mobility Interventions: Pressure redistribution bed/mattress (bed type), Float heels    Nutrition Interventions: Document food/fluid/supplement intake    Friction and Shear Interventions: Minimize layers, HOB 30 degrees or less                Problem: Grieving  Goal: *Able to express feelings of grief  Outcome: Progressing Towards Goal  Goal: *Able to identify stages of grieving process  Outcome: Progressing Towards Goal     Problem: Mood - Altered  Goal: *Alleviation of anxiety and depressive symptoms  Outcome: Progressing Towards Goal     Problem: Discharge Planning  Goal: *Participates in discharge planning  Outcome: Progressing Towards Goal     Problem: Patient Education: Go to Patient Education Activity  Goal: Patient/Family Education  Outcome: Progressing Towards Goal     Problem: Pressure Injury - Risk of  Goal: *Prevention of pressure injury  Description: Document Markie Scale and appropriate interventions in the flowsheet. Outcome: Progressing Towards Goal  Note: Pressure Injury Interventions:             Activity Interventions: Pressure redistribution bed/mattress(bed type)    Mobility Interventions: Pressure redistribution bed/mattress (bed type), Float heels    Nutrition Interventions: Document food/fluid/supplement intake    Friction and Shear Interventions: Minimize layers, HOB 30 degrees or less                Problem: Patient Education: Go to Patient Education Activity  Goal: Patient/Family Education  Outcome: Progressing Towards Goal

## 2023-03-07 NOTE — PROGRESS NOTES
[facility] Help to Those in Need  [phone]    Patient Name: Alvin Mcduffie  YOB: 1955    Date of Provider Hospice Visit: 03/07/23    Level of Care:   [x] General Inpatient (GIP)    [] Routine   [] Respite    Current Location of Care:  [] [facility] [] [facility] [] [address] [] [address] [x] Hospice House THE [facility], patient referred from:  [] [facility] [] [facility] [] [address] [] [address] [] Home [x] Other:     Date of Original Hospice Admission: 3/5/2023  Hospice Medical Director at time of admission: Dr. Tommy Levin Diagnosis: Stage IV ovarian cancer  Diagnoses RELATED to the terminal prognosis: nausea, vomiting, constipation, abdominal pain, carcinomatosis, hx TIA 2018  Other Diagnoses: depression, hypothyroidism      HOSPICE SUMMARY   Do not cut and paste chart information other than imaging findings    Alvin Mcduffie is a 78y.o. year old who was admitted to [facility]. She has extensive history of ovarian cancer treatments beginning in January of 2020. S/p open debulking on 4/28/2020, long term chemo. Last given in July 2020 Patient had a CT scan 4/17/21 with multiple intraperitoneal nodules new from previous scan. The patient's principle diagnosis has resulted in ongoing abdominal pain, now with nausea that is difficult to control. She has been vomiting with intake of food or pills, only keeping down sips of water and tea this week. Refer to LCD     Functionally, the patient's Karnofsky and/or Palliative Performance Scale has declined over a period of months and is estimated at 40. The patient is dependent on the following ADLs:  She requires assistance for bathing    Objective information that support this patients limited prognosis includes: progression of disease by CT scan, unable to tolerate more chemo. Decline in PO intake, dysgeusia, anorexia, ongoing nausea. , uncontrolled abdominal pain    The patient/family chose comfort measures with the support of Hospice. HOSPICE DIAGNOSES   Active Symptoms:  1. Nausea and vomiting.  - not improved with home regimen  2. Constipation, difficulty managing at home  3. Cancer related abdominal pain, and unable to keep down liquid morphine at home  4. Stage IV ovarian cancer with peritoneal carcinomatosis     PLAN   Patient will continue [facility] level care as patient needs frequent nursing assessments, IV medication management-not tolerating anything orally at home and pain just not managed. Patient continues to experience a lot of abdominal discomfort despite scheduling IV morphine. We did a rectal exam yesterday and there was no stool in the vault. Once again, pain I think is related to carcinomatosis. Abdomen does appear more distended. She has been able to tolerate some ice chips. Not really able to eat or drink anything extensively. Despite the scheduled morphine, patient continues to have significant amount abdominal/back discomfort related to her cancer. I think she would best served by PCA as I do not think she will be able to have her pain managed with oral medications. .. Pain management-discussed extensively a PCA with the patient but also with [facility] team who were present during our discussion with patient. Ideally, typically Dilaudid is the easiest medication to obtain so we did a trial dose of Dilaudid with her but she experienced the nausea and vomiting afterwards-similar to her experience after surgery. So we will start her on fentanyl PCA at 35 mcg/h basal and 25 mcg bolus dose every 15 minutes-this is based on the morphine dose that she had been using as well as the as needed doses. We will stop her fentanyl transdermal.  I suspect we will need to make adjustments but I think this is a good starting point. This allows her to have a little more control of managing the symptoms. [facility] will investigate whether they can obtain a PCA pump at home.   Patient is okay with staying at the hospice house. Nausea and vomiting-continue scheduled Zofran for now, DC oral Decadron as she is not tolerating  Constipation-attempt suppository and make sure no evidence of impaction-no impaction noted  Plan reviewed with the patient as well as [facility] hospice team     and SW to support family needs  Disposition: return home when symptoms improved  Hospice Plan of care was reviewed in detail and agree with current plan of care    Discussed with bedside nurse and also with [facility] hospice RN  Prognosis estimated based on 03/07/23 clinical assessment is:   [] Hours to Days    [x] Days to Weeks    [] Other:    Communicated plan of care with: Hospice Case Manager; Hospice IDT; Care Team     GOALS OF CARE     Patient/Medical POA stated Goal of Care: goal of care is comfort     [x] I have reviewed and/or updated ACP information in the Advance Care Planning Navigator. This information is available in the [facility] [address] link in the patient's chart header. Primary Decision [facility] Agent):   Primary Decision Maker: Geovanna Diallo - Daughter - [phone]    Secondary Decision Maker: Nevin Flgregory - Brother - [phone]    Resuscitation Status: DNR  If DNR is there a Durable DNR on file? : [x] Yes [] No (If no, complete Durable DNR)    HISTORY     History obtained from: patient, hospice nurse    CHIEF COMPLAINT: nausea, dysgeusia, vomiting, poor PO intake, constipation  The patient is:   [x] Verbal  [] Nonverbal  [] Unresponsive    HPI/SUBJECTIVE:    78year old female at home supported by [employer]. She has about one week of difficult to control symptoms. Fentanyl patch and liquid morphine for pain has not been effective as she is not able to keep the morphine down. The patch was changed to Q48 hrs when it was noted that the 3rd day was when she had more pain.   She has been using enemas and senokot (2 tabs BID) but still has not had a BM in the past 5 days (since Tuesday)  She is vomiting up meds or food but is able to keep down sips of water and tea. Pain is 4/10 right now across her abdomen, it is worse with ambulation  No real change in distention, some slight bloating since summertime    3/6-continues to describe diffuse pain in her abdomen. Even with just palpation of my stethoscope cause discomfort. Continuing not eating or drinking well    3/7-patient continues to have significant pain in her abdomen. Feels like her abdomen is more distended.        REVIEW OF SYSTEMS     The following systems were: [x] reviewed  [] unable to be reviewed    Positive ROS include:  Constitutional: fatigue, weakness, in pain especially with movement  Ears/nose/mouth/throat:   Respiratory:  Gastrointestinal:poor appetite, nausea, vomiting, abdominal pain, constipation - last BM Tuesday  Everything tastes horrible  Musculoskeletal:pain,   Neurologic:poor sleep, not more than 1-2 hours per night  Adult Non-Verbal Pain Assessment Score: not applicable, pt is verbal and rates pain as 7 out of 10    Face  [] 0   No particular expression or smile  [] 1   Occasional grimace, tearing, frowning, wrinkled forehead  [] 2   Frequent grimace, tearing, frowning, wrinkled forehead    Activity (movement)  [] 0   Lying quietly, normal position  [] 1   Seeking attention through movement or slow, cautious movement  [] 2   Restless, excessive activity and/or withdrawal reflexes    Guarding  [] 0   Lying quietly, no positioning of hands over areas of body  [] 1   Splinting areas of the body, tense  [] 2   Rigid, stiff    Physiology (vital signs)  [] 0   Stable vital signs  [] 1   Change in any of the following: SBP > 20mm Hg; HR > 20/minute  [] 2   Change in any of the following: SBP > 30mm Hg; HR > 25/minute    Respiratory  [] 0   Baseline RR/SpO2, compliant with ventilator  [] 1   RR > 10 above baseline, or 5% drop SpO2, mild asynchrony with ventilator  [] 2   RR > 20 above baseline, or 10% drop SpO2, asynchrony with ventilator     FUNCTIONAL ASSESSMENT     Palliative Performance Scale (PPS):50%       PSYCHOSOCIAL/SPIRITUAL ASSESSMENT     Active Problems:    Ovarian cancer ([facility] [address] (3/5/2023)    Past Medical History:   Diagnosis Date    Anxiety     Hypotension     Thyroid disease       No past surgical history on file. Social History     Tobacco Use    Smoking status: Not on file    Smokeless tobacco: Not on file   Substance Use Topics    Alcohol use: Not on file     No family history on file.    Allergies   Allergen Reactions    Sulfa (Sulfonamide Antibiotics) Diarrhea and Nausea and Vomiting      Current Facility-Administered Medications   Medication Dose Route Frequency    fentaNYL (PF) 1,500 mcg/30 mL PCA   IntraVENous TITRATE    bisacodyL (DULCOLAX) suppository 10 mg  10 mg Rectal DAILY PRN    morphine injection 4 mg  4 mg IntraVENous Q4H    prochlorperazine (COMPAZINE) injection 10 mg  10 mg IntraVENous Q6H PRN    ondansetron (ZOFRAN) injection 4 mg  4 mg IntraVENous Q8H    glycopyrrolate (ROBINUL) injection 0.2 mg  0.2 mg IntraVENous Q4H PRN    dexAMETHasone (DECADRON) tablet 2 mg (Patient Supplied)  2 mg Oral DAILY    morphine injection 4 mg  4 mg IntraVENous Q1H PRN    senna-docusate (PERICOLACE) 8.6-50 mg per tablet 2 Tablet (Patient Supplied)  2 Tablet Oral BID        PHYSICAL EXAM     Wt Readings from Last 3 Encounters:   12/08/22 66.7 kg (147 lb)   01/04/18 63.5 kg (140 lb)       Visit Vitals  /65   Pulse 98   Temp 99.3 °F (37.4 °C)   Resp 20   SpO2 (!) 89%       Supplemental O2  [] Yes  [] NO  Last bowel movement: Tuesday     Currently this patient has:  [] Peripheral IV [] PICC  [x] PORT [] ICD    [] Sigala Catheter [] NG Tube   [] PEG Tube    [] Rectal Tube [] Drain  [] Other:     Constitutional: pleasant female, lying in bed and appears uncomfortable, alert and oriented  Eyes: anicteric, EOMI  ENMT:   Cardiovascular: reg s1s2  Respiratory: clear anteriorly  Gastrointestinal: abdomen is definitely more distended, hypoactive bowel sounds, diffusely tender even with light touch, no peritoneal signs s  Musculoskeletal:nl  Skin:intact  Neurologic:speech fluent, insight intact  Psychiatric: normal affect  Other:       Pertinent Lab and or Imaging Tests:  Lab Results   Component Value Date/Time    Sodium 140 01/03/2018 11:59 AM    Potassium 3.7 01/03/2018 11:59 AM    Chloride 105 01/03/2018 11:59 AM    CO2 28 01/03/2018 11:59 AM    Anion gap 7 01/03/2018 11:59 AM    Glucose 96 01/03/2018 11:59 AM    BUN 15 01/03/2018 11:59 AM    Creatinine 0.80 01/03/2018 11:59 AM    BUN/Creatinine ratio 19 01/03/2018 11:59 AM    GFR est AA >60 01/03/2018 11:59 AM    GFR est non-AA >60 01/03/2018 11:59 AM    Calcium 8.9 01/03/2018 11:59 AM     Lab Results   Component Value Date/Time    Protein, total 7.6 01/03/2018 11:59 AM    Albumin 3.9 01/03/2018 11:59 AM           Total time:   Counseling / coordination time:   > 50% counseling / coordination?:

## 2023-03-07 NOTE — HOSPICE
NAME OF PATIENT:  Chuyita Nunez    LEVEL OF CARE:  GIP    REASON FOR GIP:   Pain, despite numerous changes in medications, Nausea and vomiting, despite changes to medications, Medication adjustment that must be monitored 24/7, and Stabilizing treatment that cannot take place at home    *PATIENT REMAINS ELIGIBLE FOR GIP LEVEL OF CARE AS EVIDENCED BY: (MUST BE ADDRESSED OF PATIENT GIP)    O2 SAFETY:    N/a    FALL INTERVENTIONS PROVIDED:   Implemented/recommended use of non-skid footwear, Implemented/recommended use of fall risk identification flag to all team members, Implemented/recommended assistive devices and encouraged their use, Implemented/recommended resources for alarm system (personal alarm, bed alarm, call bell, etc.) , Implemented/recommended environmental changes (remove hazards, lower bed, improve lighting, etc.), and Implemented/recommended increased supervision/assistance    INTERDISPLINARY COMMUNICATION/COLLABORATION:  JON GILLIS    NEW MEDICATION INITIATION DOCUMENTATION:    N/a    Reason medication is being initiated:   n/a    MD / Provider name consulted re: change in status / initiation of new medication:   n/a    New Symptom(s):   n/a    New Order(s):   n/a    Name of the person notified of the changes:   n/a    Name of person being taught:   n/a    Instructions given:   n/a    Side Effects taught:   n/a    Response to teaching:   n/a      COMFORTABLE DYING MEASURE:  Is Patient/family satisfied with symptom level?  yes    DISCHARGE PLAN:  Plan discharge home with her friend when symptoms managed. [address] to continue home care upon discharge. 0700  report received from [facility] DR VARINDER GRIFFIN  0692  Patient complained of abdominal pain 7/10. Medicated with prn morphine. Assisted patient to bathroom with stand by assistance. Abdomin slightly distended and firm. Hypoactive distant bowel sounds noted. 0800  Patient resting with her eyes closed. 0850  medicated with scheduled IV morphine. Declined oral medication. 0930  Patient resting, minimal relief noted. 1000  Bedside rounds with [facility] Staff and Dr. Osmar Scanlon. 1020  medicated with 1mg IV dilaudid for pain 6/10 (trial dose). 1040  Patient having increased nausea. Medicated with prn IV compazine. Cool wash cloth to forehead. 1130  Patient resting quietly with her eyes closed. 1225 No changes, resting quietly. 1300  medicated with scheduled morphine and zofran  1400  Patient resting quietly, friend visiting bedside  1500  PCA started, settings verified with [facility] RN. Fentanyl patch removed. Patient stated she understood how to use as needed button. 1600  patient stated pain 5/10, using PCA appropriately. Tolerated italian ice well.  1700  patient resting quietly with her eyes closed. 1800  ambulated patient to bathroom with standby assist.    [place] 36  Patient's brother visiting bedside. 1855  patient complained of increased nausea. Medicated with prn compazine  1900  report to be given to oncoming nurse.

## 2023-03-07 NOTE — PROGRESS NOTES
1900 Received report from off going RN, [facility]. Assumed care of this 78 yr old female pt, quiet in bed.   1920 Pt called out, assisted her to BR, w/ steady gait. Pt voided qs and returned to bed. Assessment as charted . Pt moving self well in bed. Pt c/o abd and back pain, rubbing abd. Pt req. Pain med. 1940 Morphine 4 mg given  via PAC as ordered. Pt denies nausea at present. 2 rails up, call bell in reach and bed exit alarm is on.  2050 Quiet in bed, eyes closed, resp. Even, nonlabored. 2110 Awakened for scheduled pain meds. Assisted pt to BR, she voided qs. Night clothes on, and into bed. Meds given as ordered. No c/o at present. Reminded pt to call for help to BR. Call bell at side, bed alarm on.  2145 Falling asleep, scrolling on phone. 2300 Restful, eyes closed, resp. Even, non labored. No facial grimace. 0005 Sleeping soundly at present, resp. easy. 0100 Awakened for ordered meds, assisted pt up to BR and back. Pt states pain is 4/10 at present  0200 Quiet, eyes closed, resp. easy. No facial grimace. 0300 Sleeping quietly at present. 0400 Sleeping w/o c/o voiced. 0515 Awakened for sched. Meds. Assisted pt up to BR to void , voided qs and returned to bed. Pt c/o wooziness this time. Pt in bed, visibly uncomfortable, rubbing belly and repositioning self in bed. Meds given as ordered. . Call bell in reach, sipping ice and H2O. 2 rails are up.   0615 Found pt uncomfortable in bed, stated pain med has not helped much.  0624 Morphine 4 mg and Compazine 10 mg via PAC given for c/o 6-7/10 pain. Instructed pt to call if med didn't help. Bed alarm is on, call bell in reach and 2 rails are up.  0640 Softly snoring, no further c/o at present.                     NAME OF PATIENT:  Jacqueline Terra    LEVEL OF CARE:  gip    REASON FOR GIP:   Pain, despite numerous changes in medications and Nausea and vomiting, despite changes to medications    *PATIENT REMAINS ELIGIBLE FOR GIP LEVEL OF CARE AS EVIDENCED BY: (MUST BE ADDRESSED OF PATIENT GIP)      REASON FOR RESPITE:  N/a    O2 SAFETY:  N/a    FALL INTERVENTIONS PROVIDED:   Implemented/recommended resources for alarm system (personal alarm, bed alarm, call bell, etc.)     INTERDISPLINARY COMMUNICATION/COLLABORATION:  Physician, IGLESIA, Elizabeth Lara, and RN, CNA    NEW MEDICATION INITIATION DOCUMENTATION:  N/a    Reason medication is being initiated:  n/a    MD / Provider name consulted re: change in status / initiation of new medication:  n/a    New Symptom(s):  n/a    New Order(s):  n/a    Name of the person notified of the changes:  n/a    Name of person being taught:  n/a    Instructions given:  n/a    Side Effects taught:  n/a    Response to teaching:  n/a      COMFORTABLE DYING MEASURE:  Is Patient/family satisfied with symptom level?  yes    DISCHARGE PLAN:  home, eol

## 2023-03-08 NOTE — PROGRESS NOTES
Problem: Nausea/Vomiting (Adult)  Goal: *Absence of nausea/vomiting  Outcome: Progressing Towards Goal  Goal: *Palliation of nausea/vomiting (Palliative Care)  Outcome: Progressing Towards Goal     Problem: Patient Education: Go to Patient Education Activity  Goal: Patient/Family Education  Outcome: Progressing Towards Goal     Problem: Breathing Pattern - Ineffective  Goal: *Use of effective breathing techniques  Outcome: Progressing Towards Goal     Problem: Pain  Goal: *Control of acute pain  Outcome: Progressing Towards Goal     Problem: Pressure Injury - Risk of  Goal: *Prevention of pressure injury  Outcome: Progressing Towards Goal  Note: Pressure Injury Interventions:  Sensory Interventions: Assess changes in LOC, Check visual cues for pain, Keep linens dry and wrinkle-free, Minimize linen layers, Pressure redistribution bed/mattress (bed type)    Moisture Interventions: Absorbent underpads, Minimize layers    Activity Interventions: Pressure redistribution bed/mattress(bed type)    Mobility Interventions: Pressure redistribution bed/mattress (bed type)    Nutrition Interventions: Document food/fluid/supplement intake, Offer support with meals,snacks and hydration    Friction and Shear Interventions: Minimize layers                Problem: Grieving  Goal: *Able to express feelings of grief  Outcome: Progressing Towards Goal  Goal: *Able to identify stages of grieving process  Outcome: Progressing Towards Goal     Problem: Mood - Altered  Goal: *Alleviation of anxiety and depressive symptoms  Outcome: Progressing Towards Goal     Problem: Discharge Planning  Goal: *Participates in discharge planning  Outcome: Progressing Towards Goal     Problem: Patient Education: Go to Patient Education Activity  Goal: Patient/Family Education  Outcome: Progressing Towards Goal     Problem: Pressure Injury - Risk of  Goal: *Prevention of pressure injury  Description: Document Markie Scale and appropriate interventions in the flowsheet. Outcome: Progressing Towards Goal  Note: Pressure Injury Interventions:  Sensory Interventions: Assess changes in LOC, Check visual cues for pain, Keep linens dry and wrinkle-free, Minimize linen layers, Pressure redistribution bed/mattress (bed type)    Moisture Interventions: Absorbent underpads, Minimize layers    Activity Interventions: Pressure redistribution bed/mattress(bed type)    Mobility Interventions: Pressure redistribution bed/mattress (bed type)    Nutrition Interventions: Document food/fluid/supplement intake, Offer support with meals,snacks and hydration    Friction and Shear Interventions: Minimize layers                Problem: Patient Education: Go to Patient Education Activity  Goal: Patient/Family Education  Outcome: Progressing Towards Goal     Problem: Falls - Risk of  Goal: *Absence of Falls  Description: Document Balta Fall Risk and appropriate interventions in the flowsheet.   Outcome: Progressing Towards Goal  Note: Fall Risk Interventions:

## 2023-03-08 NOTE — HOSPICE
NAME OF PATIENT:  John Denney    LEVEL OF CARE:  [facility]    REASON FOR GIP:   Pain, despite numerous changes in medications, Nausea and vomiting, despite changes to medications, Medication adjustment that must be monitored 24/7, and Stabilizing treatment that cannot take place at home    *PATIENT REMAINS ELIGIBLE FOR GIP LEVEL OF CARE AS EVIDENCED BY: (MUST BE ADDRESSED OF PATIENT GIP)    O2 SAFETY:    N/a    FALL INTERVENTIONS PROVIDED:   Implemented/recommended use of non-skid footwear, Implemented/recommended use of fall risk identification flag to all team members, Implemented/recommended assistive devices and encouraged their use, Implemented/recommended resources for alarm system (personal alarm, bed alarm, call bell, etc.) , Implemented/recommended environmental changes (remove hazards, lower bed, improve lighting, etc.), and Implemented/recommended increased supervision/assistance    INTERDISPLINARY COMMUNICATION/COLLABORATION:  Physician and RN, CNA    NEW MEDICATION INITIATION DOCUMENTATION:    N/a    Reason medication is being initiated:   n/a    MD / Provider name consulted re: change in status / initiation of new medication:   n/a    New Symptom(s):   n/a    New Order(s):   n/a    Name of the person notified of the changes:   n/a    Name of person being taught:   n/a    Instructions given:   n/a    Side Effects taught:   n/a    Response to teaching:   n/a      COMFORTABLE DYING MEASURE:  Is Patient/family satisfied with symptom level?  yes    DISCHARGE PLAN:  Plan discharge back home with her friends when nausea and pain better controled. [facility] to continue care upon discharge. 0700  report received from [place] [id number] setting verified with off going nurse. 0730  Patient stated pain 6/10. Encouraged use of pca button. Abdomin appears slightly more distended this morning. Patient continues to have intermittent nausea. Bed alarm on and call bell within reach for safety.   [facility] Patient resting with her eyes closed. 0840  Bedside rounds with Dr. Filipe Corets. 0900  PCA settings adjusted for improved management of her abdominal pain. Verified with Patricia De La Torre RN.  2779  Patient ambulated to chair in room. Sitting up in recliner call bell with in reach. 1030  Patient resting quietly with her eyes closed. 40 [facility] syringe hung, setting verified with Patricia De La Torre RN. Medicated with scheduled IV compazine. Patient remains sitting up in recliner. 1200  patient returned to bed, positioned for comfort. Using call PCA as needed. 1300  patient resting quietly, friend visiting bedside. 6[facility] nurse visiting bedside. 1430  No changes, resting quietly. 1530  Patient resting quietly with her eyes closed. 1600 Patient ambulated to bathroom, complaining of increased severe abdominal pain. PCA settings 11 demands with 9 doses delivered since this morning. 1620  settings to be adjusted to improved patients pain control per Dr. Filipe Cortes. 1645  Setting adjusted and verified with Patricia De La Torre RN  0848  resting with her eyes open, states pain continues to be 7/10, encouraged her to use PCA button as needed. 1815  Patient resting with her eyes closed. [facility] visiting bedside. [id number]  Brother gone for the evening. Patient resting quietly.   1900  report to be given to on coming nurse

## 2023-03-08 NOTE — PROGRESS NOTES
1900 Report received from ELIS Morley.  6269 Verified PCA at bedside w/ ELIS Morley. Pt alert, slow to answer questions. Pt rates pain 5/10. Family member at bedside. Provided pt w/ ice and a popsicle. She denies further needs at this time. 2000 Pt resting in bed. Family member at bedside. 2050 Patient resting in bed, eyes closed, respirations unlabored. 2130 Assisted pt up to the bathroom. Returned to bed, encouraged pt to push PCA button. 2140 Administered scheduled Zofran, see MAR. Pt denies needs at this time. Call light within reach. 2230 Patient resting in bed, eyes closed, neutral facial expression. 2330 Patient resting in bed, eyes closed, respirations unlabored. 0000 Patient resting in bed, eyes closed, snoring softly. 0050 Patient resting in bed, eyes closed, snoring softly. 0130 Patient resting in bed, eyes closed, respirations unlabored. 0230 Assisted pt to bathroom, she voided clear yellow urine. Assisted pt back to bed, she reports pain rated 10/10. She had been sleeping and hadn't used the PCA button recently. Pt delivered PCA dose. PRN compazine administered for nausea. Encouraged pt to use another PCA dose in 15 minutes. She verbalizes understanding. Provided pt w/ ice, she denies further needs at this time. 0330 Patient resting in bed, eyes closed, respirations unlabored. 0430 Patient resting in bed, eyes closed, neutral facial expression. 6248 Patient resting in bed, eyes closed, respirations unlabored. 0545 Scheduled zofran administered, see MAR. Encouraged use of PCA. Pt denies needs at this time. 3849 Assisted pt up to bathroom, voided clear yellow urine. Smear of stool noted in brief, changed. Assisted back to bed, provided pt w/ ice and a popsicle. Pt denies further needs at this time. 0700 Report given to ekta GILLIS.       NAME OF PATIENT:  Chuyita Nunez    LEVEL OF CARE:  GIP    REASON FOR GIP:   Pain, despite numerous changes in medications, Nausea and vomiting, despite changes to medications, Medication adjustment that must be monitored 24/7, and Stabilizing treatment that cannot take place at home    *PATIENT REMAINS ELIGIBLE FOR GIP LEVEL OF CARE AS EVIDENCED BY: (MUST BE ADDRESSED OF PATIENT GIP) symptom management - uncontrolled pain, nausea, requiring frequent RN assessment and intervention      REASON FOR RESPITE:  N/A    O2 SAFETY:  N/A    FALL INTERVENTIONS PROVIDED:   Implemented/recommended resources for alarm system (personal alarm, bed alarm, call bell, etc.)  and Implemented/recommended environmental changes (remove hazards, lower bed, improve lighting, etc.)    INTERDISPLINARY COMMUNICATION/COLLABORATION:  Physician, IGLESIA, Carole Shelton, and RN, CNA    NEW MEDICATION INITIATION DOCUMENTATION:  N/A    Reason medication is being initiated:  N/A    MD / Provider name consulted re: change in status / initiation of new medication:  N/A    New Symptom(s):  N/A    New Order(s):  N/A    Name of the person notified of the changes:  N/A    Name of person being taught:  N/A    Instructions given:  N/A    Side Effects taught:  N/A    Response to teaching:  N/A      COMFORTABLE DYING MEASURE:  Is Patient/family satisfied with symptom level?  yes    DISCHARGE PLAN:  home once symptoms are managed

## 2023-03-08 NOTE — PROGRESS NOTES
[address] [facility] Help to Those in Need  [phone]    Patient Name: Clara Mendez  YOB: 1955    Date of Provider Hospice Visit: 03/08/23    Level of Care:   [x] General Inpatient (GIP)    [] Routine   [] Respite    Current Location of Care:  [] [facility] [] [facility] [] [facility] [] [facility] [x] Hospice House [facility], patient referred from:  [] [facility] [] [facility] [] [facility] [] [facility] [] Home [x] Other:     Date of Original Hospice Admission: 3/5/2023  Hospice Medical Director at time of admission: Dr. Mio Morgan Diagnosis: Stage IV ovarian cancer  Diagnoses RELATED to the terminal prognosis: nausea, vomiting, constipation, abdominal pain, carcinomatosis, hx TIA 2018  Other Diagnoses: depression, hypothyroidism      HOSPICE SUMMARY   Do not cut and paste chart information other than imaging findings    Clara Mendez is a 78y.o. year old who was admitted to [facility]. She has extensive history of ovarian cancer treatments beginning in January of 2020. S/p open debulking on 4/28/2020, long term chemo. Last given in July 2020 Patient had a CT scan 4/17/21 with multiple intraperitoneal nodules new from previous scan. The patient's principle diagnosis has resulted in ongoing abdominal pain, now with nausea that is difficult to control. She has been vomiting with intake of food or pills, only keeping down sips of water and tea this week. Refer to LCD     Functionally, the patient's Karnofsky and/or Palliative Performance Scale has declined over a period of months and is estimated at 40. The patient is dependent on the following ADLs:  She requires assistance for bathing    Objective information that support this patients limited prognosis includes: progression of disease by CT scan, unable to tolerate more chemo. Decline in PO intake, dysgeusia, anorexia, ongoing nausea. , uncontrolled abdominal pain    The patient/family chose comfort measures with the support of Hospice. HOSPICE DIAGNOSES   Active Symptoms:  1. Nausea and vomiting.  - not improved with home regimen  2. Constipation, difficulty managing at home  3. Cancer related abdominal pain, and unable to keep down liquid morphine at home  4. Stage IV ovarian cancer with peritoneal carcinomatosis     PLAN   Patient will continue [facility] level care as patient needs frequent nursing assessments, IV medication management-not tolerating anything orally at home and pain just not managed. Fentanyl PCA has been helpful for pain but still with nausea. Used multiple prn doses of compazine. Pain management-fentanyl PCA currently at 35 mcg/hr and 25 mcg every 15 min. Used approximately 14 prn doses since started which would be an additional 14 mcg/hr. Will adjust her basal rate to 45 mcg/hr and bolus dose of 35 mcg. Nausea and vomiting-change to compazine to 10 mg every 6 and adjust zofran to prn  Constipation-attempt suppository and make sure no evidence of impaction-no impaction noted  Anxiety-patient states much more anxious-ativan caused nightmares. Will d/c and have versed 2 mg IV every 15 min prn  Plan will be reviewed with [facility] hospice team  Plan reviewed with patient and bedside nursing team     and SW to support family needs  Disposition: return home when symptoms improved  Hospice Plan of care was reviewed in detail and agree with current plan of care    Discussed with bedside nurse and also with [facility] hospice RN  Prognosis estimated based on 03/08/23 clinical assessment is:   [] Hours to Days    [x] Days to Weeks    [] Other:    Communicated plan of care with: Hospice Case Manager; Hospice IDT; Care Team     GOALS OF CARE     Patient/Medical POA stated Goal of Care: goal of care is comfort     [x] I have reviewed and/or updated ACP information in the Advance Care Planning Navigator. This information is available in the [facility] [address] link in the patient's chart header.     Primary Decision Trudy (Health Care Agent):   Primary Decision Maker: Helene Tam - Daughter - [phone]    Secondary Decision Maker: Scooby Hernández - Brother - [phone]    Resuscitation Status: DNR  If DNR is there a Durable DNR on file? : [x] Yes [] No (If no, complete Durable DNR)    HISTORY     History obtained from: patient, hospice nurse    CHIEF COMPLAINT: nausea, dysgeusia, vomiting, poor PO intake, constipation  The patient is:   [x] Verbal  [] Nonverbal  [] Unresponsive    HPI/SUBJECTIVE:    78year old female at home supported by [employer]. She has about one week of difficult to control symptoms. Fentanyl patch and liquid morphine for pain has not been effective as she is not able to keep the morphine down. The patch was changed to Q48 hrs when it was noted that the 3rd day was when she had more pain. She has been using enemas and senokot (2 tabs BID) but still has not had a BM in the past 5 days (since Tuesday)  She is vomiting up meds or food but is able to keep down sips of water and tea. Pain is 4/10 right now across her abdomen, it is worse with ambulation  No real change in distention, some slight bloating since summertime    3/6-continues to describe diffuse pain in her abdomen. Even with just palpation of my stethoscope cause discomfort. Continuing not eating or drinking well    3/7-patient continues to have significant pain in her abdomen. Feels like her abdomen is more distended. 3/8-patient anxious but feels like pain is better managed overall.  Still with nausea       REVIEW OF SYSTEMS     The following systems were: [x] reviewed  [] unable to be reviewed    Positive ROS include:  Constitutional: fatigue, weakness, in pain especially with movement  Ears/nose/mouth/throat:   Respiratory:  Gastrointestinal:poor appetite, nausea, vomiting, abdominal pain, constipation - last BM Tuesday  Everything tastes horrible  Musculoskeletal:pain,   Neurologic:poor sleep, not more than 1-2 hours per night  Adult Non-Verbal Pain Assessment Score: not applicable, pt is verbal and rates pain as 4 out of 10    Face  [] 0   No particular expression or smile  [] 1   Occasional grimace, tearing, frowning, wrinkled forehead  [] 2   Frequent grimace, tearing, frowning, wrinkled forehead    Activity (movement)  [] 0   Lying quietly, normal position  [] 1   Seeking attention through movement or slow, cautious movement  [] 2   Restless, excessive activity and/or withdrawal reflexes    Guarding  [] 0   Lying quietly, no positioning of hands over areas of body  [] 1   Splinting areas of the body, tense  [] 2   Rigid, stiff    Physiology (vital signs)  [] 0   Stable vital signs  [] 1   Change in any of the following: SBP > 20mm Hg; HR > 20/minute  [] 2   Change in any of the following: SBP > 30mm Hg; HR > 25/minute    Respiratory  [] 0   Baseline RR/SpO2, compliant with ventilator  [] 1   RR > 10 above baseline, or 5% drop SpO2, mild asynchrony with ventilator  [] 2   RR > 20 above baseline, or 10% drop SpO2, asynchrony with ventilator     FUNCTIONAL ASSESSMENT     Palliative Performance Scale (PPS):50%       PSYCHOSOCIAL/SPIRITUAL ASSESSMENT     Active Problems:    Ovarian cancer ([facility] [address] (3/5/2023)    Past Medical History:   Diagnosis Date    Anxiety     Hypotension     Thyroid disease       No past surgical history on file. Social History     Tobacco Use    Smoking status: Not on file    Smokeless tobacco: Not on file   Substance Use Topics    Alcohol use: Not on file     No family history on file.    Allergies   Allergen Reactions    Sulfa (Sulfonamide Antibiotics) Diarrhea and Nausea and Vomiting      Current Facility-Administered Medications   Medication Dose Route Frequency    prochlorperazine (COMPAZINE) injection 10 mg  10 mg IntraVENous Q6H    ondansetron (ZOFRAN) injection 4 mg  4 mg IntraVENous Q6H PRN    fentaNYL (PF) 1,500 mcg/30 mL PCA   IntraVENous TITRATE    bisacodyL (DULCOLAX) suppository 10 mg  10 mg Rectal DAILY PRN    glycopyrrolate (ROBINUL) injection 0.2 mg  0.2 mg IntraVENous Q4H PRN        PHYSICAL EXAM     Wt Readings from Last 3 Encounters:   12/08/22 66.7 kg (147 lb)   01/04/18 63.5 kg (140 lb)       Visit Vitals  /64 (BP 1 Location: Right lower arm, BP Patient Position: Reclining; At rest)   Pulse 88   Temp 97.1 °F (36.2 °C)   Resp 20   SpO2 90%       Supplemental O2  [] Yes  [] NO  Last bowel movement: Tuesday     Currently this patient has:  [] Peripheral IV [] PICC  [x] PORT [] ICD    [] Sigala Catheter [] NG Tube   [] PEG Tube    [] Rectal Tube [] Drain  [] Other:     Constitutional: pleasant female, lying in bed and appears anxious but pain is improved  Eyes: anicteric, EOMI  ENMT:   Cardiovascular: reg s1s2  Respiratory: clear anteriorly  Gastrointestinal: abdomen is definitely more distended, slightly more firm, hypoactive bowel sounds, diffusely tender even with light touch, no peritoneal signs s  Musculoskeletal:nl  Skin:intact  Neurologic:speech fluent, insight intact  Psychiatric: normal affect, anxious  Other:       Pertinent Lab and or Imaging Tests:  Lab Results   Component Value Date/Time    Sodium 140 01/03/2018 11:59 AM    Potassium 3.7 01/03/2018 11:59 AM    Chloride 105 01/03/2018 11:59 AM    CO2 28 01/03/2018 11:59 AM    Anion gap 7 01/03/2018 11:59 AM    Glucose 96 01/03/2018 11:59 AM    BUN 15 01/03/2018 11:59 AM    Creatinine 0.80 01/03/2018 11:59 AM    BUN/Creatinine ratio 19 01/03/2018 11:59 AM    GFR est AA >60 01/03/2018 11:59 AM    GFR est non-AA >60 01/03/2018 11:59 AM    Calcium 8.9 01/03/2018 11:59 AM     Lab Results   Component Value Date/Time    Protein, total 7.6 01/03/2018 11:59 AM    Albumin 3.9 01/03/2018 11:59 AM           Total time:   Counseling / coordination time:   > 50% counseling / coordination?:

## 2023-03-08 NOTE — PROGRESS NOTES
Problem: Nausea/Vomiting (Adult)  Goal: *Absence of nausea/vomiting  Outcome: Progressing Towards Goal  Goal: *Palliation of nausea/vomiting (Palliative Care)  Outcome: Progressing Towards Goal     Problem: Patient Education: Go to Patient Education Activity  Goal: Patient/Family Education  Outcome: Progressing Towards Goal     Problem: Breathing Pattern - Ineffective  Goal: *Use of effective breathing techniques  Outcome: Progressing Towards Goal     Problem: Pain  Goal: *Control of acute pain  Outcome: Progressing Towards Goal     Problem: Pressure Injury - Risk of  Goal: *Prevention of pressure injury  Outcome: Progressing Towards Goal  Note: Pressure Injury Interventions:  Sensory Interventions: Assess changes in LOC, Check visual cues for pain, Keep linens dry and wrinkle-free, Minimize linen layers, Pressure redistribution bed/mattress (bed type)    Moisture Interventions: Absorbent underpads, Minimize layers    Activity Interventions: Pressure redistribution bed/mattress(bed type)    Mobility Interventions: Pressure redistribution bed/mattress (bed type)    Nutrition Interventions: Document food/fluid/supplement intake, Offer support with meals,snacks and hydration    Friction and Shear Interventions: Minimize layers        Problem: Grieving  Goal: *Able to express feelings of grief  Outcome: Progressing Towards Goal  Goal: *Able to identify stages of grieving process  Outcome: Progressing Towards Goal     Problem: Mood - Altered  Goal: *Alleviation of anxiety and depressive symptoms  Outcome: Progressing Towards Goal     Problem: Discharge Planning  Goal: *Participates in discharge planning  Outcome: Progressing Towards Goal     Problem: Patient Education: Go to Patient Education Activity  Goal: Patient/Family Education  Outcome: Progressing Towards Goal     Problem: Pressure Injury - Risk of  Goal: *Prevention of pressure injury  Description: Document Markie Scale and appropriate interventions in the flowsheet.   Outcome: Progressing Towards Goal  Note: Pressure Injury Interventions:  Sensory Interventions: Assess changes in LOC, Check visual cues for pain, Keep linens dry and wrinkle-free, Minimize linen layers, Pressure redistribution bed/mattress (bed type)    Moisture Interventions: Absorbent underpads, Minimize layers    Activity Interventions: Pressure redistribution bed/mattress(bed type)    Mobility Interventions: Pressure redistribution bed/mattress (bed type)    Nutrition Interventions: Document food/fluid/supplement intake, Offer support with meals,snacks and hydration    Friction and Shear Interventions: Minimize layers        Problem: Patient Education: Go to Patient Education Activity  Goal: Patient/Family Education  Outcome: Progressing Towards Goal

## 2023-03-09 NOTE — PROGRESS NOTES
1800 Report received from [facility]. PCA verified at bedside. Pt resting in bed w/ eyes closed. Family member arrived to visit. 1850 Pt resting in bed, awake, talking w/ visitor. She denies needs at this time. 1950 Shift assessment complete. Pt resting in bed, no visitors present at this time. Pt denies nausea. 2030 Pt resting quietly in bed. 2130 Pt resting in bed, neutral facial expression. 2230 Patient resting in bed, eyes closed, respirations unlabored. 2330 Pt resting quietly in bed. 0000 Patient resting in bed, eyes closed, snoring softly. 0054 Pt resting in bed awake. Administered scheduled compazine. Pt reports increased pain, encouraged PCA use. Assisted pt to bathroom, voided clear yellow urine. Assisted back to bed. Pt denies needs at this time. Lights dimmed. Bed alarm on, call light within reach. 0130 Pt resting quietly in bed.  0230 Pt resting in bed, neutral facial expression. 0320 Patient resting in bed, eyes closed, respirations unlabored. 0415 Pt resting in bed, neutral facial expression. 0445 Pt resting in bed, eyes closed, neutral facial expression. 0530 Pt resting in bed, awake. Pt rates pain 2/10, denies needs at this time. 7010 Pt assisted to bathroom by JON Damon. Administered scheduled compazine, see MAR. Pt given ice chips, denies further needs at this time. Bed alarm on, call light within reach. 0700 Report given to oncoming RN. NAME OF PATIENT:  Sofy Carlos    LEVEL OF CARE:  GIP    REASON FOR GIP:   Pain, despite numerous changes in medications and Medication adjustment that must be monitored 24/7    *PATIENT REMAINS ELIGIBLE FOR GIP LEVEL OF CARE AS EVIDENCED BY: (MUST BE ADDRESSED OF PATIENT GIP) requiring changes to PCA for pain management, requiring IV medication, frequent RN assessment and intervention.       REASON FOR RESPITE:  N/A    O2 SAFETY:  N/A    FALL INTERVENTIONS PROVIDED:   Implemented/recommended resources for alarm system (personal alarm, bed alarm, call bell, etc.)  and Implemented/recommended environmental changes (remove hazards, lower bed, improve lighting, etc.)    INTERDISPLINARY COMMUNICATION/COLLABORATION:  Physician, IGLESIA, Davonte Mejia, and RN, CNA    NEW MEDICATION INITIATION DOCUMENTATION:  N/A    Reason medication is being initiated:  N/A    MD / Provider name consulted re: change in status / initiation of new medication:  N/A    New Symptom(s):  N/A    New Order(s):  N/A    Name of the person notified of the changes:  N/A    Name of person being taught:  N/A    Instructions given:  N/A    Side Effects taught:  N/A    Response to teaching:  N/A      COMFORTABLE DYING MEASURE:  Is Patient/family satisfied with symptom level?  yes    DISCHARGE PLAN:  home when symptoms are managed

## 2023-03-09 NOTE — PROGRESS NOTES
[address] [facility] Help to Those in Need  [phone]    Patient Name: Renny Vicente  YOB: 1955    Date of Provider Hospice Visit: 03/09/23    Level of Care:   [x] General Inpatient (GIP)    [] Routine   [] Respite    Current Location of Care:  [] [facility] [] [facility] [] [id number] [address] [facility] [] [facility] [x] Hospice House THE [facility], patient referred from:  [] [facility] [] [facility] [] [id number] [address] [facility] [] [facility] [] Home [x] Other:     Date of Original Hospice Admission: 3/5/2023  Hospice Medical Director at time of admission: Dr. Reymundo Frazier Diagnosis: Stage IV ovarian cancer  Diagnoses RELATED to the terminal prognosis: nausea, vomiting, constipation, abdominal pain, carcinomatosis, hx TIA 2018  Other Diagnoses: depression, hypothyroidism      HOSPICE SUMMARY   Do not cut and paste chart information other than imaging findings    Renny Vicente is a 78y.o. year old who was admitted to [facility]. She has extensive history of ovarian cancer treatments beginning in January of 2020. S/p open debulking on 4/28/2020, long term chemo. Last given in July 2020 Patient had a CT scan 4/17/21 with multiple intraperitoneal nodules new from previous scan. The patient's principle diagnosis has resulted in ongoing abdominal pain, now with nausea that is difficult to control. She has been vomiting with intake of food or pills, only keeping down sips of water and tea this week. Refer to LCD     Functionally, the patient's Karnofsky and/or Palliative Performance Scale has declined over a period of months and is estimated at 40. The patient is dependent on the following ADLs:  She requires assistance for bathing    Objective information that support this patients limited prognosis includes: progression of disease by CT scan, unable to tolerate more chemo. Decline in PO intake, dysgeusia, anorexia, ongoing nausea. , uncontrolled abdominal pain    The patient/family chose comfort measures with the support of Hospice. HOSPICE DIAGNOSES   Active Symptoms:  1. Nausea and vomiting.  - not improved with home regimen  2. Constipation, difficulty managing at home  3. Cancer related abdominal pain, and unable to keep down liquid morphine at home  4. Stage IV ovarian cancer with peritoneal carcinomatosis     PLAN   Patient will continue [facility] level care as patient needs frequent nursing assessments, IV medication management-not tolerating anything orally at home and pain just not managed. Fentanyl PCA has been helpful for pain but still with nausea. Used multiple prn doses of compazine. Pain management-multiple adjustments in her PCA made yesterday. Patient now on 60 mcg an hour basal and 50 mcg every 15 minutes. She only needed 2 as needed doses overnight after making these adjustments. She definitely feels like her pain is better managed. Nausea and vomiting-continue compazine to 10 mg every 6 and adjust zofran to prn. This has been effective  Constipation-related to her cancer  Anxiety-patient states much more anxious-ativan caused nightmares. Will d/c and have versed 2 mg IV every 15 min prn  Plan will be reviewed with [facility] team  Plan reviewed with patient and bedside nursing team.  Patient definitely prefers ongoing care at the [facility] as she was worried about caregiving at home as well as the management of her symptoms. We will continue to interact with the [facility] hospice team.  She also asked me to talk with her brother. Addendum-I was able to talk with patient's brother later in the afternoon. Updated him on the plan of care, medications, prognosis which may be days to weeks at the most that she is not able to really eat and drink very much. Also discussed the fact that difficult to obtain IV opioids in the home setting. Anticipate patient may be here until end-of-life. He appreciated the call and said we could call him anytime and update him.      and SW to support family needs  Disposition: return home when symptoms improved  Hospice Plan of care was reviewed in detail and agree with current plan of care    Discussed with bedside nurse and also with [facility] RN  Prognosis estimated based on 03/09/23 clinical assessment is:   [] Hours to Days    [x] Days to Weeks    [] Other:    Communicated plan of care with: Hospice Case Manager; Hospice IDT; Care Team     GOALS OF CARE     Patient/Medical POA stated Goal of Care: goal of care is comfort     [x] I have reviewed and/or updated ACP information in the Advance Care Planning Navigator. This information is available in the [address] link in the patient's chart header. Primary Decision [facility] Agent):   Primary Decision Maker: Judy Gregg - Daughter - [phone]    Secondary Decision Maker: Kun Pepe - Brother - [phone]    Resuscitation Status: DNR  If DNR is there a Durable DNR on file? : [x] Yes [] No (If no, complete Durable DNR)    HISTORY     History obtained from: patient, hospice nurse    CHIEF COMPLAINT: nausea, dysgeusia, vomiting, poor PO intake, constipation  The patient is:   [x] Verbal  [] Nonverbal  [] Unresponsive    HPI/SUBJECTIVE:    78year old female at home supported by [employer]. She has about one week of difficult to control symptoms. Fentanyl patch and liquid morphine for pain has not been effective as she is not able to keep the morphine down. The patch was changed to Q48 hrs when it was noted that the 3rd day was when she had more pain. She has been using enemas and senokot (2 tabs BID) but still has not had a BM in the past 5 days (since Tuesday)  She is vomiting up meds or food but is able to keep down sips of water and tea. Pain is 4/10 right now across her abdomen, it is worse with ambulation  No real change in distention, some slight bloating since summertime    3/6-continues to describe diffuse pain in her abdomen.   Even with just palpation of my stethoscope cause discomfort. Continuing not eating or drinking well    3/7-patient continues to have significant pain in her abdomen. Feels like her abdomen is more distended. 3/8-patient anxious but feels like pain is better managed overall. Still with nausea    3/9-patient appears more comfortable this morning. A little tearful at times.   Nausea and pain seems somewhat improved       REVIEW OF SYSTEMS     The following systems were: [x] reviewed  [] unable to be reviewed    Positive ROS include:  Constitutional: fatigue, weakness, in pain especially with movement  Ears/nose/mouth/throat:   Respiratory:  Gastrointestinal:poor appetite, nausea, vomiting, abdominal pain, constipation - last BM Tuesday  Everything tastes horrible  Musculoskeletal:pain,   Neurologic:poor sleep, not more than 1-2 hours per night  Adult Non-Verbal Pain Assessment Score: not applicable, pt is verbal and rates pain as 4 out of 10    Face  [] 0   No particular expression or smile  [] 1   Occasional grimace, tearing, frowning, wrinkled forehead  [] 2   Frequent grimace, tearing, frowning, wrinkled forehead    Activity (movement)  [] 0   Lying quietly, normal position  [] 1   Seeking attention through movement or slow, cautious movement  [] 2   Restless, excessive activity and/or withdrawal reflexes    Guarding  [] 0   Lying quietly, no positioning of hands over areas of body  [] 1   Splinting areas of the body, tense  [] 2   Rigid, stiff    Physiology (vital signs)  [] 0   Stable vital signs  [] 1   Change in any of the following: SBP > 20mm Hg; HR > 20/minute  [] 2   Change in any of the following: SBP > 30mm Hg; HR > 25/minute    Respiratory  [] 0   Baseline RR/SpO2, compliant with ventilator  [] 1   RR > 10 above baseline, or 5% drop SpO2, mild asynchrony with ventilator  [] 2   RR > 20 above baseline, or 10% drop SpO2, asynchrony with ventilator     FUNCTIONAL ASSESSMENT     Palliative Performance Scale (PPS):50% PSYCHOSOCIAL/SPIRITUAL ASSESSMENT     Active Problems:    Ovarian cancer ([facility] [address] (3/5/2023)    Past Medical History:   Diagnosis Date    Anxiety     Hypotension     Thyroid disease       No past surgical history on file. Social History     Tobacco Use    Smoking status: Not on file    Smokeless tobacco: Not on file   Substance Use Topics    Alcohol use: Not on file     No family history on file. Allergies   Allergen Reactions    Sulfa (Sulfonamide Antibiotics) Diarrhea and Nausea and Vomiting      Current Facility-Administered Medications   Medication Dose Route Frequency    prochlorperazine (COMPAZINE) injection 10 mg  10 mg IntraVENous Q6H    ondansetron (ZOFRAN) injection 4 mg  4 mg IntraVENous Q6H PRN    midazolam (VERSED) injection 2 mg  2 mg IntraVENous Q15MIN PRN    fentaNYL (PF) 1,500 mcg/30 mL PCA   IntraVENous TITRATE    bisacodyL (DULCOLAX) suppository 10 mg  10 mg Rectal DAILY PRN    glycopyrrolate (ROBINUL) injection 0.2 mg  0.2 mg IntraVENous Q4H PRN        PHYSICAL EXAM     Wt Readings from Last 3 Encounters:   12/08/22 66.7 kg (147 lb)   01/04/18 63.5 kg (140 lb)       Visit Vitals  /68 (BP 1 Location: Left lower arm, BP Patient Position: Reclining; At rest)   Pulse 100   Temp 98.1 °F (36.7 °C)   Resp 18   SpO2 92%       Supplemental O2  [] Yes  [] NO  Last bowel movement: Tuesday     Currently this patient has:  [] Peripheral IV [] PICC  [x] PORT [] ICD    [] Sigala Catheter [] NG Tube   [] PEG Tube    [] Rectal Tube [] Drain  [] Other:     Constitutional: pleasant female, lying in bed and appears much calmer, alert and oriented  Eyes: anicteric, EOMI  ENMT:   Cardiovascular: reg s1s2  Respiratory: clear anteriorly  Gastrointestinal: abdomen is definitely more distended, slightly more firm, hypoactive bowel sounds, diffusely tender even with light touch, no peritoneal signs   Musculoskeletal:nl  Skin:intact  Neurologic:speech fluent, insight intact  Psychiatric: normal affect, calm other: Pertinent Lab and or Imaging Tests:  Lab Results   Component Value Date/Time    Sodium 140 01/03/2018 11:59 AM    Potassium 3.7 01/03/2018 11:59 AM    Chloride 105 01/03/2018 11:59 AM    CO2 28 01/03/2018 11:59 AM    Anion gap 7 01/03/2018 11:59 AM    Glucose 96 01/03/2018 11:59 AM    BUN 15 01/03/2018 11:59 AM    Creatinine 0.80 01/03/2018 11:59 AM    BUN/Creatinine ratio 19 01/03/2018 11:59 AM    GFR est AA >60 01/03/2018 11:59 AM    GFR est non-AA >60 01/03/2018 11:59 AM    Calcium 8.9 01/03/2018 11:59 AM     Lab Results   Component Value Date/Time    Protein, total 7.6 01/03/2018 11:59 AM    Albumin 3.9 01/03/2018 11:59 AM           Total time:   Counseling / coordination time:   > 50% counseling / coordination?:

## 2023-03-09 NOTE — PROGRESS NOTES
1900: Report received from ELIS Morley.  1905: Fentanyl PCA rate verified. Total dose for 12 hr day shift: 942.6 mcg  Total number of attempts: 15  Total number of doses delivered: 12  Pump cleared, 19 ml residual volume. White board updated. Ice chips given and lights dimmed per patient request.   1930: Tech in room, vitals taken, documented. 1945: Therapy dog [place] at facility to visit, patient agrees to visit from Milvia choi. 2000: Patient alert in bed, drowsy. Assessment completed, documented. Patient describes pain as 7/10 in abdomen, guarding with auscultation and palpation. States that pain has been better with medication adjustments. Abdomen firm, round. Generalized pale skin. No edema noted. No needs at this time, call bell within reach. 2100: Patient awake in bed, glasses on, looking at phone. No complaints or needs at this time. 2200: Patient sleeping, neutral facial expression, body relaxed. 2250: Patient resting in bed, eyes closed, unlabored respirations. 2350: Patient resting in bed, eyes closed, neutral facial expression, body relaxed. 0030: Pump in room beeping, volume to be infused for saline bag adjusted. Patient awake in bed, no needs at this time. 0115: Patient sleeping, arouses to voice. Scheduled compazine given. Patient returns to sleep.  0205: Patient sleeping, unlabored respirations. 0300: Patient resting quietly in bed, eyes closed, neutral facial expression, body relaxed. 0330: Patient in bed, grimacing, states pain woke her. Encouraged PCA use. Patient agrees, uses. Asked if there was anything I could get patient, anything I could help do to reposition her to make her more comfortable, patient states \"no, I don't think so\", is appreciative. 0430: Patient awake in bed, returned to bed after voiding. No needs at this time. 0530: Patient resting quietly in bed, eyes closed, neutral facial expression, body relaxed. 0600: Patient sleeping, arouses to voice. Scheduled compazine given. No needs at this time. 0630: Patient resting quietly in bed, eyes closed, unlabored respirations. 0700: Report given to oncoming RN. NAME OF PATIENT:  Malena Loving    LEVEL OF CARE:  [facility]    REASON FOR GIP:   Pain, despite numerous changes in medications, Nausea and vomiting, despite changes to medications, Medication adjustment that must be monitored 24/7, and Stabilizing treatment that cannot take place at home    *PATIENT REMAINS ELIGIBLE FOR [facility] LEVEL OF CARE AS EVIDENCED BY: Patient with uncontrolled pain and nausea requiring IV medications with adjustments as well as frequent nursing assessments. REASON FOR RESPITE:  N/A    O2 SAFETY:  N/A    FALL INTERVENTIONS PROVIDED:   Implemented/recommended use of non-skid footwear, Implemented/recommended use of fall risk identification flag to all team members, Implemented/recommended assistive devices and encouraged their use, Implemented/recommended resources for alarm system (personal alarm, bed alarm, call bell, etc.) , Implemented/recommended environmental changes (remove hazards, lower bed, improve lighting, etc.), and Implemented/recommended increased supervision/assistance    INTERDISPLINARY COMMUNICATION/COLLABORATION:  Physician, IGLESIA, Corrine Nogueira, and RN, CNA    NEW MEDICATION INITIATION DOCUMENTATION:  N/A    Reason medication is being initiated:  N/A    MD / Provider name consulted re: change in status / initiation of new medication:  N/A    New Symptom(s):  N/A    New Order(s):  N/A    Name of the person notified of the changes:  N/A    Name of person being taught:  N/A    Instructions given:  N/A    Side Effects taught:  N/A    Response to teaching:  N/A      COMFORTABLE DYING MEASURE:  Is Patient/family satisfied with symptom level?  yes    DISCHARGE PLAN: Patient will discharge home with home hospice services once symptoms are managed.

## 2023-03-09 NOTE — PROGRESS NOTES
Problem: Nausea/Vomiting (Adult)  Goal: *Absence of nausea/vomiting  Outcome: Progressing Towards Goal  Goal: *Palliation of nausea/vomiting (Palliative Care)  Outcome: Progressing Towards Goal     Problem: Patient Education: Go to Patient Education Activity  Goal: Patient/Family Education  Outcome: Progressing Towards Goal     Problem: Breathing Pattern - Ineffective  Goal: *Use of effective breathing techniques  Outcome: Progressing Towards Goal     Problem: Pain  Goal: *Control of acute pain  Outcome: Progressing Towards Goal     Problem: Pressure Injury - Risk of  Goal: *Prevention of pressure injury  Outcome: Progressing Towards Goal  Note: Pressure Injury Interventions:  Sensory Interventions: Check visual cues for pain, Float heels, Keep linens dry and wrinkle-free, Maintain/enhance activity level, Minimize linen layers, Pad between skin to skin, Pressure redistribution bed/mattress (bed type), Turn and reposition approx. every two hours (pillows and wedges if needed)    Moisture Interventions: Absorbent underpads, Maintain skin hydration (lotion/cream), Minimize layers, Moisture barrier    Activity Interventions: Pressure redistribution bed/mattress(bed type)    Mobility Interventions: Float heels, HOB 30 degrees or less, Pressure redistribution bed/mattress (bed type), Turn and reposition approx.  every two hours(pillow and wedges)    Nutrition Interventions: Document food/fluid/supplement intake, Offer support with meals,snacks and hydration    Friction and Shear Interventions: HOB 30 degrees or less, Minimize layers                Problem: Grieving  Goal: *Able to express feelings of grief  Outcome: Progressing Towards Goal  Goal: *Able to identify stages of grieving process  Outcome: Progressing Towards Goal     Problem: Mood - Altered  Goal: *Alleviation of anxiety and depressive symptoms  Outcome: Progressing Towards Goal     Problem: Discharge Planning  Goal: *Participates in discharge planning  Outcome: Progressing Towards Goal     Problem: Patient Education: Go to Patient Education Activity  Goal: Patient/Family Education  Outcome: Progressing Towards Goal     Problem: Pressure Injury - Risk of  Goal: *Prevention of pressure injury  Description: Document Markie Scale and appropriate interventions in the flowsheet. Outcome: Progressing Towards Goal  Note: Pressure Injury Interventions:  Sensory Interventions: Check visual cues for pain, Float heels, Keep linens dry and wrinkle-free, Maintain/enhance activity level, Minimize linen layers, Pad between skin to skin, Pressure redistribution bed/mattress (bed type), Turn and reposition approx. every two hours (pillows and wedges if needed)    Moisture Interventions: Absorbent underpads, Maintain skin hydration (lotion/cream), Minimize layers, Moisture barrier    Activity Interventions: Pressure redistribution bed/mattress(bed type)    Mobility Interventions: Float heels, HOB 30 degrees or less, Pressure redistribution bed/mattress (bed type), Turn and reposition approx. every two hours(pillow and wedges)    Nutrition Interventions: Document food/fluid/supplement intake, Offer support with meals,snacks and hydration    Friction and Shear Interventions: HOB 30 degrees or less, Minimize layers                Problem: Patient Education: Go to Patient Education Activity  Goal: Patient/Family Education  Outcome: Progressing Towards Goal     Problem: Falls - Risk of  Goal: *Absence of Falls  Description: Document Balta Fall Risk and appropriate interventions in the flowsheet.   Outcome: Progressing Towards Goal     Problem: Patient Education: Go to Patient Education Activity  Goal: Patient/Family Education  Outcome: Progressing Towards Goal

## 2023-03-09 NOTE — PROGRESS NOTES
Problem: Nausea/Vomiting (Adult)  Goal: *Absence of nausea/vomiting  Outcome: Progressing Towards Goal  Goal: *Palliation of nausea/vomiting (Palliative Care)  Outcome: Progressing Towards Goal     Problem: Pain  Goal: *Control of acute pain  Outcome: Progressing Towards Goal     Problem: Pressure Injury - Risk of  Goal: *Prevention of pressure injury  Outcome: Progressing Towards Goal  Note: Pressure Injury Interventions:  Sensory Interventions: Assess changes in LOC, Check visual cues for pain, Keep linens dry and wrinkle-free, Minimize linen layers, Pressure redistribution bed/mattress (bed type)    Moisture Interventions: Absorbent underpads, Minimize layers    Activity Interventions: Pressure redistribution bed/mattress(bed type)    Mobility Interventions: Pressure redistribution bed/mattress (bed type)    Nutrition Interventions: Document food/fluid/supplement intake, Offer support with meals,snacks and hydration    Friction and Shear Interventions: Minimize layers

## 2023-03-09 NOTE — PROGRESS NOTES
0700: report received from [place], [address]: PCA pump dual verified with [employer] [place], RN. Pt awake but lethargic. Unable to answer question about pain at this time, states she \"is not quite awake yet. \" Full assessment done, see flow sheets. Reinforced use of call bell and PCA button as needed for pain. Bed alarm set. 0800: pt resting quietly with eyes closed. Body relaxed. Bed alarm on.   0855: new fentanyl PCA syringe hung and dual verified with Ghislaine Haddad RN. Pt states pain 4/10. RN assisted patient up to bathroom to void. Pt requesting to have a shower today. 0930: RN assisted patient with shower. CNA changed bed linens. Pt mostly steady on feet with some episodes of increased weakness. 1000: [facility] RN here to see patient. Pt reports no needs at this time. Call bell within reach. Bed alarm on.   1100: pt resting quietly, call bell within reach. 8073: Dr Bonifacio Bonilla to bedside. Pt reports pain 4-6/10 so far today. Encouraged use of PCA button. 1155: pt assisted up to bathroom by RN. Returned to bed, scheduled compazine given per [facility]. Fresh ice chips provided for patient. 1230: sister Gladis Banuelos at bedside. brought patient some clean clothes. 1330: friend at bedside visiting with patient. 1400: pt resting quietly in bed. Denies any needs at this time. PCA button and call bell within reach, bed alarm on.   1500: friend at bedside. Pt reports pain 3/10 at this time. No needs at this time. Call bell within reach. 1615: pt resting quietly, no needs at this time. Call bell within reach. 1720: pt resting quietly, using PCA button intermittently for pain control. 1800: scheduled compazine given per MAR. Report given to José Miguel Moon RN and PCA dual verified.      NAME OF PATIENT:  [facility]    LEVEL OF CARE:  GIP    REASON FOR GIP:   Medication adjustment that must be monitored 24/7 and Stabilizing treatment that cannot take place at home    *PATIENT REMAINS ELIGIBLE FOR [facility] LEVEL OF CARE AS EVIDENCED BY: (MUST BE ADDRESSED OF PATIENT GIP)  Pt is requiring frequent nursing assessments to monitor for for pain and nausea. Patient is requiring IV meds at this time to manage symptoms. REASON FOR RESPITE:  N/a    O2 SAFETY:  N/a    FALL INTERVENTIONS PROVIDED:   Implemented/recommended use of non-skid footwear, Implemented/recommended use of fall risk identification flag to all team members, Implemented/recommended assistive devices and encouraged their use, Implemented/recommended resources for alarm system (personal alarm, bed alarm, call bell, etc.) , Implemented/recommended environmental changes (remove hazards, lower bed, improve lighting, etc.), and Implemented/recommended increased supervision/assistance    INTERDISPLINARY COMMUNICATION/COLLABORATION:  Physician, IGLESIA, Zari Dill, and RN, CNA    NEW MEDICATION INITIATION DOCUMENTATION:  Continue current medication regimen    Reason medication is being initiated:  n/a    MD / Provider name consulted re: change in status / initiation of new medication:  n/a    New Symptom(s):  n/a    New Order(s):  n/a    Name of the person notified of the changes:  n/a    Name of person being taught:  n/a    Instructions given:  n/a    Side Effects taught:  n/a    Response to teaching:  n/a      COMFORTABLE DYING MEASURE:  Is Patient/family satisfied with symptom level?  yes    DISCHARGE PLAN:  return home once symptoms are managed and continue to be followed by [facility].

## 2023-03-10 NOTE — PROGRESS NOTES
Problem: Nausea/Vomiting (Adult)  Goal: *Absence of nausea/vomiting  Outcome: Progressing Towards Goal  Goal: *Palliation of nausea/vomiting (Palliative Care)  Outcome: Progressing Towards Goal     Problem: Patient Education: Go to Patient Education Activity  Goal: Patient/Family Education  Outcome: Progressing Towards Goal     Problem: Breathing Pattern - Ineffective  Goal: *Use of effective breathing techniques  Outcome: Progressing Towards Goal     Problem: Pain  Goal: *Control of acute pain  Outcome: Progressing Towards Goal     Problem: Pressure Injury - Risk of  Goal: *Prevention of pressure injury  Outcome: Progressing Towards Goal  Note: Pressure Injury Interventions:  Sensory Interventions: Check visual cues for pain, Float heels, Keep linens dry and wrinkle-free, Maintain/enhance activity level, Minimize linen layers, Pressure redistribution bed/mattress (bed type), Turn and reposition approx. every two hours (pillows and wedges if needed)    Moisture Interventions: Absorbent underpads, Check for incontinence Q2 hours and as needed, Maintain skin hydration (lotion/cream), Minimize layers    Activity Interventions: Pressure redistribution bed/mattress(bed type), Increase time out of bed    Mobility Interventions: Float heels, HOB 30 degrees or less, Pressure redistribution bed/mattress (bed type), Turn and reposition approx.  every two hours(pillow and wedges)    Nutrition Interventions: Document food/fluid/supplement intake, Offer support with meals,snacks and hydration    Friction and Shear Interventions: Minimize layers, HOB 30 degrees or less                Problem: Grieving  Goal: *Able to express feelings of grief  Outcome: Progressing Towards Goal  Goal: *Able to identify stages of grieving process  Outcome: Progressing Towards Goal     Problem: Mood - Altered  Goal: *Alleviation of anxiety and depressive symptoms  Outcome: Progressing Towards Goal     Problem: Discharge Planning  Goal: *Participates in discharge planning  Outcome: Progressing Towards Goal     Problem: Patient Education: Go to Patient Education Activity  Goal: Patient/Family Education  Outcome: Progressing Towards Goal     Problem: Pressure Injury - Risk of  Goal: *Prevention of pressure injury  Description: Document Markie Scale and appropriate interventions in the flowsheet. Outcome: Progressing Towards Goal  Note: Pressure Injury Interventions:  Sensory Interventions: Check visual cues for pain, Float heels, Keep linens dry and wrinkle-free, Maintain/enhance activity level, Minimize linen layers, Pressure redistribution bed/mattress (bed type), Turn and reposition approx. every two hours (pillows and wedges if needed)    Moisture Interventions: Absorbent underpads, Check for incontinence Q2 hours and as needed, Maintain skin hydration (lotion/cream), Minimize layers    Activity Interventions: Pressure redistribution bed/mattress(bed type), Increase time out of bed    Mobility Interventions: Float heels, HOB 30 degrees or less, Pressure redistribution bed/mattress (bed type), Turn and reposition approx. every two hours(pillow and wedges)    Nutrition Interventions: Document food/fluid/supplement intake, Offer support with meals,snacks and hydration    Friction and Shear Interventions: Minimize layers, HOB 30 degrees or less                Problem: Patient Education: Go to Patient Education Activity  Goal: Patient/Family Education  Outcome: Progressing Towards Goal     Problem: Falls - Risk of  Goal: *Absence of Falls  Description: Document Balta Fall Risk and appropriate interventions in the flowsheet.   Outcome: Progressing Towards Goal  Note: Fall Risk Interventions:                                Problem: Patient Education: Go to Patient Education Activity  Goal: Patient/Family Education  Outcome: Progressing Towards Goal

## 2023-03-10 NOTE — PROGRESS NOTES
[address] [facility] Help to Those in Need  [phone]    Patient Name: Alvin Mcduffie  YOB: 1955    Date of Provider Hospice Visit: 03/10/23    Level of Care:   [x] General Inpatient (GIP)    [] Routine   [] Respite    Current Location of Care:  [] [facility] [] [facility] [] [address] [] [address] [x] Hospice House [facility], patient referred from:  [] [facility] [] [facility] [] [address] [] [address] [] Home [x] Other:     Date of Original Hospice Admission: 3/5/2023  Hospice Medical Director at time of admission: Dr. Tommy Levin Diagnosis: Stage IV ovarian cancer  Diagnoses RELATED to the terminal prognosis: nausea, vomiting, constipation, abdominal pain, carcinomatosis, hx TIA 2018  Other Diagnoses: depression, hypothyroidism      HOSPICE SUMMARY   Do not cut and paste chart information other than imaging findings    Alvin Mcduffie is a 78y.o. year old who was admitted to [facility]. She has extensive history of ovarian cancer treatments beginning in January of 2020. S/p open debulking on 4/28/2020, long term chemo. Last given in July 2020 Patient had a CT scan 4/17/21 with multiple intraperitoneal nodules new from previous scan. The patient's principle diagnosis has resulted in ongoing abdominal pain, now with nausea that is difficult to control. She has been vomiting with intake of food or pills, only keeping down sips of water and tea this week. Refer to LCD     Functionally, the patient's Karnofsky and/or Palliative Performance Scale has declined over a period of months and is estimated at 40. The patient is dependent on the following ADLs:  She requires assistance for bathing    Objective information that support this patients limited prognosis includes: progression of disease by CT scan, unable to tolerate more chemo. Decline in PO intake, dysgeusia, anorexia, ongoing nausea. , uncontrolled abdominal pain    The patient/family chose comfort measures with the support of Hospice. HOSPICE DIAGNOSES   Active Symptoms:  1. Nausea and vomiting.  - not improved with home regimen  2. Constipation, difficulty managing at home  3. Cancer related abdominal pain, and unable to keep down liquid morphine at home  4. Stage IV ovarian cancer with peritoneal carcinomatosis     PLAN   Patient will continue GIP level care as patient needs frequent nursing assessments, IV medication management-not tolerating anything orally at home and pain just not managed. Patient appears much more comfortable this morning. Adjustments in medication has seemed to help. .    Pain management-Fentanyl PCA remains at 60 mcg/h basal and 50 mcg bolus dose every 15 minutes. She required 2 as needed doses overnight. This is much improved compared to the prior 48 hours. Nausea and vomiting-continue compazine to 10 mg every 6 and adjust zofran to prn. Much more effective than Zofran. Patient continues to only tolerate ice chips. Constipation-related to her cancer  Anxiety-patient states much more anxious-ativan caused nightmares. Will d/c and have versed 2 mg IV every 15 min prn. No as needed doses needed  Plan will be reviewed with [facility] hosp[facility] team  Plan reviewed with patient and bedside nursing team.  Once again, patient's preference is to remain at the [facility] hospice house and given the fact that we are unable to obtain IV medication in the home setting, patient will remain at the [facility] under [facility] level care as she cannot tolerate oral medication without having nausea/vomiting and then significant symptom burden. Patient wanted me to talk with her daughter-they have been a little bit estranged and later in the day, daughter actually came to the bedside. We had a long discussion about patient's plan of care, prognosis, medication management, disease progression. She appreciated the discussion and we are glad the 2 of them have been able to talk.   Continue to provide support and active listening       and SW to support family needs  Disposition: return home when symptoms improved  Hospice Plan of care was reviewed in detail and agree with current plan of care    Discussed with bedside nurse and also with [facility] RN  Prognosis estimated based on 03/10/23 clinical assessment is:   [] Hours to Days    [x] Days to Weeks    [] Other:    Communicated plan of care with: Hospice Case Manager; Hospice IDT; Care Team     GOALS OF CARE     Patient/Medical POA stated Goal of Care: goal of care is comfort     [x] I have reviewed and/or updated ACP information in the Advance Care Planning Navigator. This information is available in the [address] link in the patient's chart header. Primary Decision [facility] Agent):   Primary Decision Maker: Alida Guo - Daughter - [phone]    Secondary Decision Maker: Anayeli Mora - Brother - [phone]    Resuscitation Status: DNR  If DNR is there a Durable DNR on file? : [x] Yes [] No (If no, complete Durable DNR)    HISTORY     History obtained from: patient, hospice nurse    CHIEF COMPLAINT: nausea, dysgeusia, vomiting, poor PO intake, constipation  The patient is:   [x] Verbal  [] Nonverbal  [] Unresponsive    HPI/SUBJECTIVE:    78year old female at home supported by [employer]. She has about one week of difficult to control symptoms. Fentanyl patch and liquid morphine for pain has not been effective as she is not able to keep the morphine down. The patch was changed to Q48 hrs when it was noted that the 3rd day was when she had more pain. She has been using enemas and senokot (2 tabs BID) but still has not had a BM in the past 5 days (since Tuesday)  She is vomiting up meds or food but is able to keep down sips of water and tea.    Pain is 4/10 right now across her abdomen, it is worse with ambulation  No real change in distention, some slight bloating since summertime    3/6-continues to describe diffuse pain in her abdomen. Even with just palpation of my stethoscope cause discomfort. Continuing not eating or drinking well    3/7-patient continues to have significant pain in her abdomen. Feels like her abdomen is more distended. 3/8-patient anxious but feels like pain is better managed overall. Still with nausea    3/9-patient appears more comfortable this morning. A little tearful at times. Nausea and pain seems somewhat improved    3/10-patient appears much more comfortable this morning. She actually was able to have a little bit of a smile. Still only tolerating ice chips.        REVIEW OF SYSTEMS     The following systems were: [x] reviewed  [] unable to be reviewed    Positive ROS include:  Constitutional: fatigue, weakness, in pain especially with movement  Ears/nose/mouth/throat:   Respiratory:  Gastrointestinal:poor appetite, nausea, vomiting, abdominal pain, constipation - last BM Tuesday  Everything tastes horrible  Musculoskeletal:pain,   Neurologic:poor sleep, not more than 1-2 hours per night  Adult Non-Verbal Pain Assessment Score: not applicable, pt is verbal and rates pain as 2 out of 10    Face  [] 0   No particular expression or smile  [] 1   Occasional grimace, tearing, frowning, wrinkled forehead  [] 2   Frequent grimace, tearing, frowning, wrinkled forehead    Activity (movement)  [] 0   Lying quietly, normal position  [] 1   Seeking attention through movement or slow, cautious movement  [] 2   Restless, excessive activity and/or withdrawal reflexes    Guarding  [] 0   Lying quietly, no positioning of hands over areas of body  [] 1   Splinting areas of the body, tense  [] 2   Rigid, stiff    Physiology (vital signs)  [] 0   Stable vital signs  [] 1   Change in any of the following: SBP > 20mm Hg; HR > 20/minute  [] 2   Change in any of the following: SBP > 30mm Hg; HR > 25/minute    Respiratory  [] 0   Baseline RR/SpO2, compliant with ventilator  [] 1   RR > 10 above baseline, or 5% drop SpO2, mild asynchrony with ventilator  [] 2   RR > 20 above baseline, or 10% drop SpO2, asynchrony with ventilator     FUNCTIONAL ASSESSMENT     Palliative Performance Scale (PPS):50%       PSYCHOSOCIAL/SPIRITUAL ASSESSMENT     Active Problems:    Ovarian cancer ([facility] [address] (3/5/2023)    Past Medical History:   Diagnosis Date    Anxiety     Hypotension     Thyroid disease       No past surgical history on file. Social History     Tobacco Use    Smoking status: Not on file    Smokeless tobacco: Not on file   Substance Use Topics    Alcohol use: Not on file     No family history on file.    Allergies   Allergen Reactions    Sulfa (Sulfonamide Antibiotics) Diarrhea and Nausea and Vomiting      Current Facility-Administered Medications   Medication Dose Route Frequency    prochlorperazine (COMPAZINE) injection 10 mg  10 mg IntraVENous Q6H    ondansetron (ZOFRAN) injection 4 mg  4 mg IntraVENous Q6H PRN    midazolam (VERSED) injection 2 mg  2 mg IntraVENous Q15MIN PRN    fentaNYL (PF) 1,500 mcg/30 mL PCA   IntraVENous TITRATE    bisacodyL (DULCOLAX) suppository 10 mg  10 mg Rectal DAILY PRN    glycopyrrolate (ROBINUL) injection 0.2 mg  0.2 mg IntraVENous Q4H PRN        PHYSICAL EXAM     Wt Readings from Last 3 Encounters:   12/08/22 66.7 kg (147 lb)   01/04/18 63.5 kg (140 lb)       Visit Vitals  BP (!) 147/79 (BP 1 Location: Left lower arm, BP Patient Position: At rest)   Pulse (!) 101   Temp 98.3 °F (36.8 °C)   Resp 16   SpO2 93%       Supplemental O2  [] Yes  [] NO  Last bowel movement: Tuesday     Currently this patient has:  [] Peripheral IV [] PICC  [x] PORT [] ICD    [] Sigala Catheter [] NG Tube   [] PEG Tube    [] Rectal Tube [] Drain  [] Other:     Constitutional: pleasant female, lying in bed and appears much calmer, alert and oriented, smiling at times  Eyes: anicteric, EOMI  ENMT:   Cardiovascular: reg s1s2  Respiratory: clear anteriorly  Gastrointestinal: abdomen is definitely more distended, slightly more firm, hypoactive bowel sounds, able to palpate her abdomen today without her wincing/grimacing.   Musculoskeletal:nl  Skin:intact  Neurologic:speech fluent, insight intact  Psychiatric: normal affect, calm       Pertinent Lab and or Imaging Tests:  Lab Results   Component Value Date/Time    Sodium 140 01/03/2018 11:59 AM    Potassium 3.7 01/03/2018 11:59 AM    Chloride 105 01/03/2018 11:59 AM    CO2 28 01/03/2018 11:59 AM    Anion gap 7 01/03/2018 11:59 AM    Glucose 96 01/03/2018 11:59 AM    BUN 15 01/03/2018 11:59 AM    Creatinine 0.80 01/03/2018 11:59 AM    BUN/Creatinine ratio 19 01/03/2018 11:59 AM    GFR est AA >60 01/03/2018 11:59 AM    GFR est non-AA >60 01/03/2018 11:59 AM    Calcium 8.9 01/03/2018 11:59 AM     Lab Results   Component Value Date/Time    Protein, total 7.6 01/03/2018 11:59 AM    Albumin 3.9 01/03/2018 11:59 AM           Total time:   Counseling / coordination time:   > 50% counseling / coordination?:

## 2023-03-10 NOTE — PROGRESS NOTES
[address] [facility] care of patient. Report received from [place], [facility]. Pt resting in bed with visitor at side. PRC pump dual verified. [id number] [address] visiting with pt. Update provided. 1600  Pt sleeping. No indication of discomfort noted. 1700  Pt sleeping. Neutral facial expression, unlabored respirations. 1800  Pt ambulated to BR with assistance. Pt very weak and unsteady, nearly fell. Increased confusion noted. 1830  Scheduled Compazine administered. Pt confused, delayed speech, picking at PCA tubing and linens. Order received from Dr. Yonas Ascencio for Sigala Catheter insertion as needed. 1900  Report given to oncoming nurse.

## 2023-03-10 NOTE — PROGRESS NOTES
Problem: Nausea/Vomiting (Adult)  Goal: *Absence of nausea/vomiting  Outcome: Progressing Towards Goal  Goal: *Palliation of nausea/vomiting (Palliative Care)  Outcome: Progressing Towards Goal     Problem: Patient Education: Go to Patient Education Activity  Goal: Patient/Family Education  Outcome: Progressing Towards Goal     Problem: Breathing Pattern - Ineffective  Goal: *Use of effective breathing techniques  Outcome: Progressing Towards Goal     Problem: Pain  Goal: *Control of acute pain  Outcome: Progressing Towards Goal     Problem: Pressure Injury - Risk of  Goal: *Prevention of pressure injury  Outcome: Progressing Towards Goal  Note: Pressure Injury Interventions:  Sensory Interventions: Assess changes in LOC, Check visual cues for pain, Keep linens dry and wrinkle-free, Minimize linen layers, Pressure redistribution bed/mattress (bed type)    Moisture Interventions: Absorbent underpads, Minimize layers    Activity Interventions: Pressure redistribution bed/mattress(bed type)    Mobility Interventions: Pressure redistribution bed/mattress (bed type)    Nutrition Interventions: Document food/fluid/supplement intake, Offer support with meals,snacks and hydration    Friction and Shear Interventions: Minimize layers        Problem: Grieving  Goal: *Able to express feelings of grief  Outcome: Progressing Towards Goal  Goal: *Able to identify stages of grieving process  Outcome: Progressing Towards Goal     Problem: Mood - Altered  Goal: *Alleviation of anxiety and depressive symptoms  Outcome: Progressing Towards Goal     Problem: Discharge Planning  Goal: *Participates in discharge planning  Outcome: Progressing Towards Goal     Problem: Patient Education: Go to Patient Education Activity  Goal: Patient/Family Education  Outcome: Progressing Towards Goal     Problem: Pressure Injury - Risk of  Goal: *Prevention of pressure injury  Description: Document Markie Scale and appropriate interventions in the flowsheet. Outcome: Progressing Towards Goal  Note: Pressure Injury Interventions:  Sensory Interventions: Assess changes in LOC, Check visual cues for pain, Keep linens dry and wrinkle-free, Minimize linen layers, Pressure redistribution bed/mattress (bed type)    Moisture Interventions: Absorbent underpads, Minimize layers    Activity Interventions: Pressure redistribution bed/mattress(bed type)    Mobility Interventions: Pressure redistribution bed/mattress (bed type)    Nutrition Interventions: Document food/fluid/supplement intake, Offer support with meals,snacks and hydration    Friction and Shear Interventions: Minimize layers        Problem: Patient Education: Go to Patient Education Activity  Goal: Patient/Family Education  Outcome: Progressing Towards Goal     Problem: Falls - Risk of  Goal: *Absence of Falls  Description: Document Balta Fall Risk and appropriate interventions in the flowsheet.   Outcome: Progressing Towards Goal  Note: Fall Risk Interventions:           Problem: Patient Education: Go to Patient Education Activity  Goal: Patient/Family Education  Outcome: Progressing Towards Goal

## 2023-03-10 NOTE — PROGRESS NOTES
0700: report received from José Miguel Long, [facility]. PCA dual verified with José Miguel Long RN and pump cleared. Pt had 3 attempts and 3 deliveries during night shift. Pt awakened during nurse handoff but went back to sleep. Bed alarm on, call bell and PCA button within reach. 0800: pt resting quietly with eyes closed, neutral facial expression. Bed alarm on.   0900: pt resting quietly with eyes closed, respirations even and unlabored. Bed alarm on.   1000: pt awake in bed, states her pain is 2-3/10. Full assessment done, see flow sheets. RN assisted patient up to bathroom to void. Pt back to bed, bed alarm on. Fresh ice provided for patient. PCA button and call bell within reach. [id number]: Dr Ivette Cohen to bedside for rounds. 1050: daughter at bedside, Dr Ivette Cohen notified. 1119: new fentanyl PCA syringe hung and dual verified with Zuly No RN. Dr Ivette Cohen at bedside with patient and daughter. 1200: daughter leaving. CNA brought pt soda per her request.   1213: scheduled compazine given per MAR. Pt denies any other needs at this time. Call bell and PCA button within reach. Bed alarm on.   1300: pt resting quietly with eyes closed. Bed alarm on.   1415: report given to Lina Liriano RN and PCA pump dual verified. NAME OF PATIENT:  Albin Melgar    LEVEL OF CARE:  [facility]    REASON FOR GIP:   Medication adjustment that must be monitored 24/7 and Stabilizing treatment that cannot take place at home    *PATIENT REMAINS ELIGIBLE FOR GIP LEVEL OF CARE AS EVIDENCED BY: (MUST BE ADDRESSED OF PATIENT GIP)  Pt is requiring frequent nursing assessments and IV meds to manage symptoms of pain and n/v. Pt is unable to tolerate PO meds d/t increased n/v with PO intake.      REASON FOR RESPITE:  N/a    O2 SAFETY:  Pt is on room air    FALL INTERVENTIONS PROVIDED:   Implemented/recommended use of non-skid footwear, Implemented/recommended use of fall risk identification flag to all team members, Implemented/recommended assistive devices and encouraged their use, Implemented/recommended resources for alarm system (personal alarm, bed alarm, call bell, etc.) , Implemented/recommended environmental changes (remove hazards, lower bed, improve lighting, etc.), and Implemented/recommended increased supervision/assistance    INTERDISPLINARY COMMUNICATION/COLLABORATION:  Physician, MSW, [place], and RN, CNA    NEW MEDICATION INITIATION DOCUMENTATION:  Consulted AT MD to report change in pt status, Obtained Order from Provider for initiation of symptom relief medication /other medication needed, and Documentation completed in Clinical Note in [address] [facility]    Reason medication is being initiated:  n/a    MD / Provider name consulted re: change in status / initiation of new medication:  n/a    New Symptom(s):  n/a    New Order(s):  n/a    Name of the person notified of the changes:  n/a    Name of person being taught:  n/a    Instructions given:  n/a    Side Effects taught:  n/a    Response to teaching:  n/a      COMFORTABLE DYING MEASURE:  Is Patient/family satisfied with symptom level?  yes    DISCHARGE PLAN:  return home once symptoms are managed and continue to be followed by [facility].

## 2023-03-11 NOTE — PROGRESS NOTES
NAME OF PATIENT:  Francisco Sender    LEVEL OF CARE:  [facility]    REASON FOR GIP:   Pain, nausea    *PATIENT REMAINS ELIGIBLE FOR GIP LEVEL OF CARE AS EVIDENCED BY: (MUST BE ADDRESSED OF PATIENT GIP)      REASON FOR RESPITE:      O2 SAFETY:      FALL INTERVENTIONS PROVIDED:   High fall risk    INTERDISPLINARY COMMUNICATION/COLLABORATION:      NEW MEDICATION INITIATION DOCUMENTATION:      Reason medication is being initiated:      MD / Provider name consulted re: change in status / initiation of new medication:      New Symptom(s):      New Order(s):  See orders and MAR    Name of the person notified of the changes:  [facility] nurse, family, pt    Name of person being taught:      Instructions given:      Side Effects taught:      Response to teaching:        COMFORTABLE DYING MEASURE:  Is Patient/family satisfied with symptom level?   Y    DISCHARGE PLAN:

## 2023-03-11 NOTE — PROGRESS NOTES
Problem: Nausea/Vomiting (Adult)  Goal: *Absence of nausea/vomiting  3/11/2023 0315 by Jessica Lemus RN  Outcome: Progressing Towards Goal  3/11/2023 0313 by Jessica Lemus RN  Outcome: Progressing Towards Goal  Goal: *Palliation of nausea/vomiting (Palliative Care)  3/11/2023 0315 by Jessica Lemus RN  Outcome: Progressing Towards Goal  3/11/2023 0313 by Jessica Lemus RN  Outcome: Progressing Towards Goal     Problem: Patient Education: Go to Patient Education Activity  Goal: Patient/Family Education  3/11/2023 0315 by Jessica Lemus RN  Outcome: Progressing Towards Goal  3/11/2023 0313 by Jessica Lemus RN  Outcome: Progressing Towards Goal     Problem: Breathing Pattern - Ineffective  Goal: *Use of effective breathing techniques  3/11/2023 0315 by Jessica Lemus RN  Outcome: Progressing Towards Goal  3/11/2023 0313 by Jessica Lemus RN  Outcome: Progressing Towards Goal     Problem: Pain  Goal: *Control of acute pain  3/11/2023 0315 by Jessica Lemus RN  Outcome: Progressing Towards Goal  3/11/2023 0313 by Jessica Lemus RN  Outcome: Progressing Towards Goal     Problem: Pressure Injury - Risk of  Goal: *Prevention of pressure injury  3/11/2023 0315 by Jessica Lemus RN  Outcome: Progressing Towards Goal  Note: Pressure Injury Interventions:  Sensory Interventions: Assess changes in LOC, Check visual cues for pain, Keep linens dry and wrinkle-free, Minimize linen layers, Pressure redistribution bed/mattress (bed type)    Moisture Interventions: Absorbent underpads, Minimize layers    Activity Interventions: Pressure redistribution bed/mattress(bed type)    Mobility Interventions: Pressure redistribution bed/mattress (bed type)    Nutrition Interventions: Document food/fluid/supplement intake, Offer support with meals,snacks and hydration    Friction and Shear Interventions: Minimize layers      3/11/2023 0313 by Jessica Lemus RN  Outcome: Progressing Towards Goal  Note: Pressure Injury Interventions:  Sensory Interventions: Check visual cues for pain, Float heels, Keep linens dry and wrinkle-free, Maintain/enhance activity level, Minimize linen layers, Pressure redistribution bed/mattress (bed type), Turn and reposition approx. every two hours (pillows and wedges if needed)    Moisture Interventions: Absorbent underpads, Check for incontinence Q2 hours and as needed, Maintain skin hydration (lotion/cream), Minimize layers    Activity Interventions: Pressure redistribution bed/mattress(bed type), Increase time out of bed    Mobility Interventions: Float heels, HOB 30 degrees or less, Pressure redistribution bed/mattress (bed type), Turn and reposition approx.  every two hours(pillow and wedges)    Nutrition Interventions: Document food/fluid/supplement intake, Offer support with meals,snacks and hydration    Friction and Shear Interventions: Minimize layers, HOB 30 degrees or less       Problem: Grieving  Goal: *Able to express feelings of grief  3/11/2023 0315 by Gregorio Espinal RN  Outcome: Progressing Towards Goal  3/11/2023 0313 by Gregorio Espinal RN  Outcome: Progressing Towards Goal  Goal: *Able to identify stages of grieving process  3/11/2023 0315 by Gregorio Espinal RN  Outcome: Progressing Towards Goal  3/11/2023 0313 by Gregorio Espinal RN  Outcome: Progressing Towards Goal     Problem: Mood - Altered  Goal: *Alleviation of anxiety and depressive symptoms  3/11/2023 0315 by Gregorio Espinal RN  Outcome: Progressing Towards Goal  3/11/2023 0313 by Gregorio Espinal RN  Outcome: Progressing Towards Goal     Problem: Discharge Planning  Goal: *Participates in discharge planning  3/11/2023 0315 by Gregorio Espinal RN  Outcome: Progressing Towards Goal  3/11/2023 0313 by Gregorio Espinal RN  Outcome: Progressing Towards Goal     Problem: Patient Education: Go to Patient Education Activity  Goal: Patient/Family Education  3/11/2023 0315 by Gregorio Espinal RN  Outcome: Progressing Towards Goal  3/11/2023 0313 by Francy Polanco RN  Outcome: Progressing Towards Goal     Problem: Pressure Injury - Risk of  Goal: *Prevention of pressure injury  Description: Document Markie Scale and appropriate interventions in the flowsheet. 3/11/2023 0315 by Francy Polanco RN  Outcome: Progressing Towards Goal  3/11/2023 0313 by Francy Polanco RN  Outcome: Progressing Towards Goal     Problem: Patient Education: Go to Patient Education Activity  Goal: Patient/Family Education  3/11/2023 0315 by Francy Polanco RN  Outcome: Progressing Towards Goal  3/11/2023 0313 by Francy Polanco RN  Outcome: Progressing Towards Goal     Problem: Falls - Risk of  Goal: *Absence of Falls  Description: Document [facility] Harper Fall Risk and appropriate interventions in the flowsheet.   3/11/2023 0315 by Francy Polanco RN  Outcome: Progressing Towards Goal  3/11/2023 0313 by Francy Polanco RN  Outcome: Progressing Towards Goal     Problem: Patient Education: Go to Patient Education Activity  Goal: Patient/Family Education  3/11/2023 0315 by Francy Polanco RN  Outcome: Progressing Towards Goal  3/11/2023 0313 by Francy Polanco RN  Outcome: Progressing Towards Goal

## 2023-03-11 NOTE — PROGRESS NOTES
Problem: Nausea/Vomiting (Adult)  Goal: *Absence of nausea/vomiting  Outcome: Progressing Towards Goal  Goal: *Palliation of nausea/vomiting (Palliative Care)  Outcome: Progressing Towards Goal     Problem: Patient Education: Go to Patient Education Activity  Goal: Patient/Family Education  Outcome: Progressing Towards Goal     Problem: Breathing Pattern - Ineffective  Goal: *Use of effective breathing techniques  Outcome: Progressing Towards Goal     Problem: Pain  Goal: *Control of acute pain  Outcome: Progressing Towards Goal     Problem: Pressure Injury - Risk of  Goal: *Prevention of pressure injury  Outcome: Progressing Towards Goal  Note: Pressure Injury Interventions:  Sensory Interventions: Assess changes in LOC, Float heels, Keep linens dry and wrinkle-free, Minimize linen layers    Moisture Interventions: Minimize layers    Activity Interventions: Pressure redistribution bed/mattress(bed type)    Mobility Interventions: Float heels, HOB 30 degrees or less, Pressure redistribution bed/mattress (bed type)    Nutrition Interventions: Document food/fluid/supplement intake    Friction and Shear Interventions: Minimize layers                Problem: Grieving  Goal: *Able to express feelings of grief  Outcome: Progressing Towards Goal  Goal: *Able to identify stages of grieving process  Outcome: Progressing Towards Goal     Problem: Mood - Altered  Goal: *Alleviation of anxiety and depressive symptoms  Outcome: Progressing Towards Goal     Problem: Discharge Planning  Goal: *Participates in discharge planning  Outcome: Progressing Towards Goal     Problem: Patient Education: Go to Patient Education Activity  Goal: Patient/Family Education  Outcome: Progressing Towards Goal     Problem: Pressure Injury - Risk of  Goal: *Prevention of pressure injury  Description: Document Markie Scale and appropriate interventions in the flowsheet.   Outcome: Progressing Towards Goal     Problem: Patient Education: Go to Patient Education Activity  Goal: Patient/Family Education  Outcome: Progressing Towards Goal     Problem: Falls - Risk of  Goal: *Absence of Falls  Description: Document [facility] and appropriate interventions in the flowsheet.   Outcome: Progressing Towards Goal     Problem: Patient Education: Go to Patient Education Activity  Goal: Patient/Family Education  Outcome: Progressing Towards Goal -coag neg staph is likely contaminant

## 2023-03-11 NOTE — PROGRESS NOTES
1900 Report received from Maria Eugenia Figueroa PCA verified at bedside w/ Pilar Miner RN. Pt denies needs at this time. 2015 Pt resting quietly in bed. 2055 Patient resting in bed, eyes closed, respirations unlabored. 2145 Patient resting in bed, eyes closed, neutral facial expression. 2230 Pt resting quietly in bed, eyes closed. 2310 Patient resting in bed, eyes closed, respirations unlabored. 0000 Pt resting quietly in bed, eyes closed, neutral facial expression. 0030 Assisted pt to bedside commode, she voided moderate amount of clear yellow urine, brief changed. Pt unsteady getting up to commode. Assisted back to bed. Scheduled compazine administered, see MAR. O2 NC removed per pt preference. Pt states she feels confused, is having difficulty answering questions. Encouraged pt to rest. Lights dimmed. Bed alarm on, call light within reach. 0115 Pt resting quietly in bed.  0145 Pt resting quietly in bed, awake. Denies needs at this time. 0245 Patient resting in bed, eyes closed, respirations unlabored. 0345 Patient resting in bed, eyes closed, neutral facial expression. 0430 Patient resting in bed, eyes closed, respirations unlabored. 0530 Pt resting in bed, eyes closed, neutral facial expression. 0615 Pt awake, resting in bed. Given ice chips. Administered scheduled  compazine, see MAR. Pt unable to rate pain on a numeric scale but states pain is well controlled at this time, she declines to use the PCA button right now. Pt denies further needs. Bed alarm on, call light within reach. 0700 Report given to oncoming RN.       NAME OF PATIENT:  Cassidy Dozier    LEVEL OF CARE:  GIP    REASON FOR GIP:   Medication adjustment that must be monitored 24/7 and Stabilizing treatment that cannot take place at home    *PATIENT REMAINS ELIGIBLE FOR GIP LEVEL OF CARE AS EVIDENCED BY: (MUST BE ADDRESSED OF PATIENT GIP) requiring IV medication, adjustments to PCA for pain control, frequent RN assessment and interventions      REASON FOR RESPITE:  N/A    O2 SAFETY:  Concentrator positioning (6\" from furniture/drapes)    FALL INTERVENTIONS PROVIDED:   Implemented/recommended assistive devices and encouraged their use, Implemented/recommended resources for alarm system (personal alarm, bed alarm, call bell, etc.) , Implemented/recommended environmental changes (remove hazards, lower bed, improve lighting, etc.), and Implemented/recommended increased supervision/assistance    INTERDISPLINARY COMMUNICATION/COLLABORATION:  Physician, IGLESIA, Magan Rodriguez, and RN, CNA    NEW MEDICATION INITIATION DOCUMENTATION:  N/A    Reason medication is being initiated:  N/A    MD / Provider name consulted re: change in status / initiation of new medication:  N/A    New Symptom(s):  N/A    New Order(s):  N/A    Name of the person notified of the changes:  N/A    Name of person being taught:  N/A    Instructions given:  N/A    Side Effects taught:  N/A    Response to teaching:  N/A      COMFORTABLE DYING MEASURE:  Is Patient/family satisfied with symptom level?  yes    DISCHARGE PLAN:  home

## 2023-03-11 NOTE — PROGRESS NOTES
0700- report received from nurse Tracey Artis on [facility] pt  0710- bedside verification of Fent PCA completed with this nurse and Keshia Otero. Basal and bolus running as per order. Pt in bed, denies any needs at this time. Bed lwo, side rails up x3, call bell in reach, PCA button in reach, bed alarm on.  0800- Pt in bed resting. Pt is calm. States the around the clock pain pump has helped with her feelings of suffering. Pt denies any needs at this time. Pt appears to be more sullen and confused at times than she has been over the last few days. Attempted to offer assistance to pt, conversation, music, tv, pt denies any at this time. Pt has has uncontrollable pain and nausea. She is only able to tolerate 1-2 ice chips at a time. Pt still requires around the clock nursing and medical interventions that cant be done in a home setting. Pt continues to need PCA for her pain. Will continue to assess for needs and changes. 0900- pt in bed resting, eyes open. Pt denies any needs at this time. PCA pump running as per order, needed for pt uncontrollable pain. Bed low, side rails up x3, call bell in reach, pca button in reach. 1010- noted sound of bed alarm. Entered room, pt noted to be sitting on the side of the bed. Pt states, I forget sometimes. Pt assisted to bedside commode. Pt tolerated poorly due to pain. Pt states pain is 11/10, pt pushed her PCA button. Pt returned to bed, tolerated poorly. Bed low, side rails up x3, call bell in reach, bed alarm on.  1020- pt PCA syringe changed, 0 waste noted, verified waste and new syringe with Walicruz Hernandez RN. Discussed PCA with pt, encouraged pushing her button when needed. Pt states pain now is down to a 6/10. States she wants to rest.  1055- Pain Re-Assessment- pt in bed eyes open, pt states pain is now a 5/10, some improvement with PCA button. Pt encouraged to push button for pain and to call for assistance. Pt denies any needs at this time.     1115- pain-reassessment- pt in bed eyes open with visitor in room. Pt states pain is now a 4/10. Pt denies any needs at this time. 1138- received call from [facility], she stated she will be in to see pt later today. Gave pt update on [facility] nurse. Pt requested ice chips and soda. 65- Dr Khadra Carter in pt room for rounds. Pt noted to be agitated during visit. Noted pain with touch of abdomen. PCA running with basal and bolus. New orders noted for Haldol PRN. 1330- pt in bed resting, eyes open. Sisters at bedside. Spoke to sister Alo Shen, she asked about pt being confused and times of agitation, we discussed clustering visits so the pt has time to nap. Bed low, side rails upx3, call bell and PCA button in place. Bed alarm on.  1415- pt in bed, visitors have left. Pt states she is going to rest.  PCA intact and running. Lights dimmed, shades pulled. Bed low, side rails up x3, call bell in reach, PCA button in reach. Bed alarm on.   1420- visitors stated pts cell phone is missing. Checked the bed, around the pt, and bedside table. JON Arevalo checked the linen bag. Pt states her sister too her bag of dirty clothes home with her when she left. Will continue to look for cell phone. 1530- pt is in bed, visitors art bedside. No c/o at this time. PCA running. Bed low, side rails up x3, bed alarm in reach, pca button in reach. 1630- pt in bed, lights dimmed, pt resting, bed low, side rails up x3, call bell in reach. Pt pain is uncontrolled and pt needs PCA around the clock. 1730- pt is in bed eyes closed. PCA and call bell in reach. Bed low, bed alarm on. Side rails up x3.  8718- pt in bed resting, eyes closed. PCA running. Bed low, siderails up x3, call bell in reach, pca button in reach.   1910- report given to [address] [facility]

## 2023-03-11 NOTE — PROGRESS NOTES
[facility] Help to Those in Need  [phone]    Patient Name: Phoenix Ventura  YOB: 1955    Date of Provider Hospice Visit: 03/11/23    Level of Care:   [x] General Inpatient (GIP)    [] Routine   [] Respite    Current Location of Care:  [] [facility] [] [facility] [] [facility] [] [facility] [x] Hospice House [facility] patient referred from:  [] [facility] [] [facility] [] [facility] [] [facility] [] Home [x] Other:     Date of Original Hospice Admission: 3/5/2023  Hospice Medical Director at time of admission: Dr. Leonidas Israel Diagnosis: Stage IV ovarian cancer  Diagnoses RELATED to the terminal prognosis: nausea, vomiting, constipation, abdominal pain, carcinomatosis, hx TIA 2018  Other Diagnoses: depression, hypothyroidism      HOSPICE SUMMARY   Do not cut and paste chart information other than imaging findings    Phoenix Ventura is a 78y.o. year old who was admitted to [facility]. She has extensive history of ovarian cancer treatments beginning in January of 2020. S/p open debulking on 4/28/2020, long term chemo. Last given in July 2020 Patient had a CT scan 4/17/21 with multiple intraperitoneal nodules new from previous scan. The patient's principle diagnosis has resulted in ongoing abdominal pain, now with nausea that is difficult to control. She has been vomiting with intake of food or pills, only keeping down sips of water and tea this week. Refer to LCD     Functionally, the patient's Karnofsky and/or Palliative Performance Scale has declined over a period of months and is estimated at 40. The patient is dependent on the following ADLs:  She requires assistance for bathing    Objective information that support this patients limited prognosis includes: progression of disease by CT scan, unable to tolerate more chemo. Decline in PO intake, dysgeusia, anorexia, ongoing nausea. , uncontrolled abdominal pain    The patient/family chose comfort measures with the support of Hospice. HOSPICE DIAGNOSES   Active Symptoms:  1. Nausea and vomiting.  - not improved with home regimen  2. Constipation, difficulty managing at home  3. Cancer related abdominal pain, and unable to keep down liquid morphine at home  4. Stage IV ovarian cancer with peritoneal carcinomatosis     PLAN   Patient will continue [facility] level care as patient needs frequent nursing assessments, IV medication management-not tolerating anything orally at home and pain just not managed. Patient is now declining in the form of progressive confusion and associated irritability, particularly when waking from sleep which is also now more frequent. Adding Haldol, 2mg IV every 4 hours as needed in case restlessness or agitation as delirium progresses. Will avoid scheduling at this time as pt still paying attention to medicines that are being given and very particular, but not able to fully process a conversation about benefits, or participate in decision making. Pain management-Fentanyl PCA remains at 60 mcg/h basal and 50 mcg bolus dose every 15 minutes. Nausea and vomiting-continue compazine to 10 mg every 6 and zofran prn. Much more effective than Zofran. Patient continues to only tolerate ice chips. Constipation-related to large tumor burden in her abdomen. Last bowel movement 2 weeks ago. Anxiety-patient previously reported Ativan caused nightmares. Alternatively we have versed 2 mg IV available every 15 min prn. No as needed doses needed  Plan will be reviewed with [facility]  Plan reviewed with patient and bedside nursing team.  Once again, patient's preference is to remain at the community hospice house and given the fact that we are unable to obtain IV medication in the home setting, patient will remain at the community hospice house under [facility] level care as she cannot tolerate oral medication without having nausea/vomiting and then significant symptom burden.   Patient had a close friend at bedside whom we had permission to speak in front of. Friend expressed support of patient and had no questions or concerns on this date.  and SW to support family needs  Disposition: anticipate ongoing decline, and death in the [address] Hospice Plan of care was reviewed in detail and agree with current plan of care    Discussed with bedside nurse and also with [facility] RN  Prognosis estimated based on 03/11/23 clinical assessment is:   [] Hours to Days    [x] Days to Weeks    [] Other:    Communicated plan of care with: Hospice Case Manager; Hospice IDT; Care Team     GOALS OF CARE     Patient/Medical POA stated Goal of Care: goal of care is comfort     [x] I have reviewed and/or updated ACP information in the Advance Care Planning Navigator. This information is available in the [address] link in the patient's chart header. Primary Decision [facility] Agent):   Primary Decision Maker: Charo Mc - Daughter - [phone]    Secondary Decision Maker: Joyce Garza - Brother - [phone]    Resuscitation Status: DNR  If DNR is there a Durable DNR on file? : [x] Yes [] No (If no, complete Durable DNR)    HISTORY     History obtained from: patient, hospice nurse    CHIEF COMPLAINT: nausea, dysgeusia, vomiting, poor PO intake, constipation  The patient is:   [x] Verbal  [] Nonverbal  [] Unresponsive    HPI/SUBJECTIVE:    78year old female at home supported by [employer]. She has about one week of difficult to control symptoms. Fentanyl patch and liquid morphine for pain has not been effective as she is not able to keep the morphine down. The patch was changed to Q48 hrs when it was noted that the 3rd day was when she had more pain. She has been using enemas and senokot (2 tabs BID) but still has not had a BM in the past 5 days (since Tuesday)  She is vomiting up meds or food but is able to keep down sips of water and tea.    Pain is 4/10 right now across her abdomen, it is worse with ambulation  No real change in distention, some slight bloating since summertime    3/6-continues to describe diffuse pain in her abdomen. Even with just palpation of my stethoscope cause discomfort. Continuing not eating or drinking well    3/7-patient continues to have significant pain in her abdomen. Feels like her abdomen is more distended. 3/8-patient anxious but feels like pain is better managed overall. Still with nausea    3/9-patient appears more comfortable this morning. A little tearful at times. Nausea and pain seems somewhat improved    3/10-patient appears much more comfortable this morning. She actually was able to have a little bit of a smile. Still only tolerating ice chips.     3/11- patient drowsy but wakes from sleep, slowed processing evident, irritable today, major shift in personality per RN       REVIEW OF SYSTEMS     The following systems were: [x] reviewed  [] unable to be reviewed    Positive ROS include:  Constitutional: fatigue, weakness, in pain especially with movement  Ears/nose/mouth/throat:   Respiratory:  Gastrointestinal:poor appetite, nausea, vomiting, abdominal pain, constipation - last BM Tuesday  Everything tastes horrible  Musculoskeletal:pain,   Neurologic:poor sleep, not more than 1-2 hours per night  Adult Non-Verbal Pain Assessment Score: not applicable, pt is verbal but had difficulty rating pain today    Face  [] 0   No particular expression or smile  [] 1   Occasional grimace, tearing, frowning, wrinkled forehead  [] 2   Frequent grimace, tearing, frowning, wrinkled forehead    Activity (movement)  [] 0   Lying quietly, normal position  [] 1   Seeking attention through movement or slow, cautious movement  [] 2   Restless, excessive activity and/or withdrawal reflexes    Guarding  [] 0   Lying quietly, no positioning of hands over areas of body  [] 1   Splinting areas of the body, tense  [] 2   Rigid, stiff    Physiology (vital signs)  [] 0   Stable vital signs  [] 1   Change in any of the following: SBP > 20mm Hg; HR > 20/minute  [] 2   Change in any of the following: SBP > 30mm Hg; HR > 25/minute    Respiratory  [] 0   Baseline RR/SpO2, compliant with ventilator  [] 1   RR > 10 above baseline, or 5% drop SpO2, mild asynchrony with ventilator  [] 2   RR > 20 above baseline, or 10% drop SpO2, asynchrony with ventilator     FUNCTIONAL ASSESSMENT     Palliative Performance Scale (PPS):50%       PSYCHOSOCIAL/SPIRITUAL ASSESSMENT     Active Problems:    Ovarian cancer ([facility] [place] [address] (3/5/2023)    Past Medical History:   Diagnosis Date    Anxiety     Hypotension     Thyroid disease       No past surgical history on file. Social History     Tobacco Use    Smoking status: Not on file    Smokeless tobacco: Not on file   Substance Use Topics    Alcohol use: Not on file     No family history on file.    Allergies   Allergen Reactions    Sulfa (Sulfonamide Antibiotics) Diarrhea and Nausea and Vomiting      Current Facility-Administered Medications   Medication Dose Route Frequency    haloperidol lactate (HALDOL) injection 2 mg  2 mg IntraVENous Q4H PRN    prochlorperazine (COMPAZINE) injection 10 mg  10 mg IntraVENous Q6H    ondansetron (ZOFRAN) injection 4 mg  4 mg IntraVENous Q6H PRN    midazolam (VERSED) injection 2 mg  2 mg IntraVENous Q15MIN PRN    fentaNYL (PF) 1,500 mcg/30 mL PCA   IntraVENous TITRATE    bisacodyL (DULCOLAX) suppository 10 mg  10 mg Rectal DAILY PRN    glycopyrrolate (ROBINUL) injection 0.2 mg  0.2 mg IntraVENous Q4H PRN        PHYSICAL EXAM     Wt Readings from Last 3 Encounters:   12/08/22 66.7 kg (147 lb)   01/04/18 63.5 kg (140 lb)       Visit Vitals  /68 (BP 1 Location: Left upper arm, BP Patient Position: At rest;Supine;Lying)   Pulse 86   Temp 97.9 °F (36.6 °C)   Resp 20   SpO2 92%       Supplemental O2  [] Yes  [x] NO  Last bowel movement: 2 weeks ago    Currently this patient has:  [] Peripheral IV [] PICC  [x] PORT [] ICD    [] Sigala Catheter [] NG Tube   [] PEG Tube    [] Rectal Tube [] Drain  [] Other:     Constitutional: age appropriate female, lying in bed, awake, but slower to respond today, feels confused but aware of her confusion  Eyes: anicteric, EOMI  ENMT:   Cardiovascular: reg s1s2  Respiratory: clear anteriorly  Gastrointestinal: abdomen is distended, slightly more firm, hypoactive bowel sounds, expressed some discomfort in response to palpation around umbilicus  Musculoskeletal:nl  Skin:intact  Neurologic:speech fluent, insight more limited today, moves all 4, no tremor or rigidity  Psychiatric: normal affect, irritable      Pertinent Lab and or Imaging Tests:  Lab Results   Component Value Date/Time    Sodium 140 01/03/2018 11:59 AM    Potassium 3.7 01/03/2018 11:59 AM    Chloride 105 01/03/2018 11:59 AM    CO2 28 01/03/2018 11:59 AM    Anion gap 7 01/03/2018 11:59 AM    Glucose 96 01/03/2018 11:59 AM    BUN 15 01/03/2018 11:59 AM    Creatinine 0.80 01/03/2018 11:59 AM    BUN/Creatinine ratio 19 01/03/2018 11:59 AM    GFR est AA >60 01/03/2018 11:59 AM    GFR est non-AA >60 01/03/2018 11:59 AM    Calcium 8.9 01/03/2018 11:59 AM     Lab Results   Component Value Date/Time    Protein, total 7.6 01/03/2018 11:59 AM    Albumin 3.9 01/03/2018 11:59 AM           Total time:   Counseling / coordination time:   > 50% counseling / coordination?:

## 2023-03-12 NOTE — PROGRESS NOTES
0700  Report received from [place] [id number]  Dual verification of Fentanyl PCA rate with [place], RN. Pt awake, calm, denies pain, delayed speech. 0740  Assessment complete. Pt rates pain at 0/10, denies nausea/vomiting, dyspnea. Appears more withdrawn and confused than previously noted. 0840  Pt resting quietly with eyes closed, neutral facial expression, unlabored respirations. Appears to be sleeping comfortably. 0840  Pt sleeping. Neutral facial expression, unlabored respirations. 0940  Pt sleeping. No indication of discomfort noted. 1545 Wright Kilo [place] visiting with pt.  1110  New Fentanyl PCA syringe hung and rate verified with Francisca Walls RN. 0 waste. 200  Patient's friends arrived to visit. 1145  Assisted pt to [facility] commode to void. Pt utilized PCA button for pain management prior to transfer. Pt very weak and unsteady with confusion and delayed speech noted. 1215  Scheduled Compazine administered. 12  Pt's family arrived to visit. [phone]  Pt resting in bed, family having previously left [facility]. Pt quiet, withdrawn, appears confused, denies discomfort. 12  Pt resting quietly with visitor at bedside. No needs. 1545  Pt sleeping. Neutral facial expression, unlabored respirations noted. 1645  Pt sleeping. No indication of discomfort noted. 80  Pt's friend Gustabo Lela arrived to visit. Pt sleeping. NAME OF PATIENT:  Brittaney Keith    LEVEL OF CARE:  GIP    REASON FOR GIP:   Pain, despite numerous changes in medications, Nausea and vomiting, despite changes to medications, Medication adjustment that must be monitored 24/7, and Stabilizing treatment that cannot take place at home    *PATIENT REMAINS ELIGIBLE FOR GIP LEVEL OF CARE AS EVIDENCED BY: (MUST BE ADDRESSED OF PATIENT GIP)  Pt requires frequent nursing assessments, medication adjustments, and IV medications to manage symptoms of pain, nausea, vomiting, and dyspnea.       REASON FOR RESPITE:  NA    O2 SAFETY:  Concentrator positioning (6\" from furniture/drapes), Tanks stored in sánchez , No petroleum based products on face while oxygen in use, and Oxygen sign on the door    FALL INTERVENTIONS PROVIDED:   Implemented/recommended use of non-skid footwear, Implemented/recommended use of fall risk identification flag to all team members, Implemented/recommended assistive devices and encouraged their use, Implemented/recommended resources for alarm system (personal alarm, bed alarm, call bell, etc.) , Implemented/recommended environmental changes (remove hazards, lower bed, improve lighting, etc.), and Implemented/recommended increased supervision/assistance    INTERDISPLINARY COMMUNICATION/COLLABORATION:  Physician, IGLESIA, Derek Morales, and RN, CNA    NEW MEDICATION INITIATION DOCUMENTATION:  NA    Reason medication is being initiated:  NA    MD / Provider name consulted re: change in status / initiation of new medication:  NA    New Symptom(s):  NA    New Order(s):  NA    Name of the person notified of the changes:  NA    Name of person being taught:  NA    Instructions given:  NA    Side Effects taught:  NA    Response to teaching:  NA    COMFORTABLE DYING MEASURE:  Is Patient/family satisfied with symptom level?  yes    DISCHARGE PLAN:  end of life vs home with hospice

## 2023-03-12 NOTE — HOSPICE
1900 Report received from Yin Barr S [facility] PCA settings verified with ELIS Barr.  2000 Patient resting on bed with a visitor at the bedside. Vitals and assessment completed. Patient unable to rate her pain but states its ok. 2035 Patients visitor has left for the night. Bed alarm set and lights dimmed. 2105 Patient resting quietly with eyes closed, respirations even and unlabored. 2200 Patient resting with eyes closed and neutral facial expression. 2300 Patient resting quietly with eyes closed, respirations unlabored. 2345 Administered scheduled IV compazine. Patient became startled, requested to be woken up when administering meds. Declines to use the bathroom. Patient winced, stated yes when asked if she's having pain however declined to use her PCA. 0040 Patient resting quietly on her right side with eyes closed, neutral facial expression observed. 9575 Patient resting with eyes closed, respirations unlabored. 0200 Daylight Savings Time began. 0320 Patient resting with eyes closed and neutral facial expression. 6358 Patient resting quietly with eyes closed, respirations even and unlabored. 8027 Patient resting quietly in bed, eyes are closed, respirations unlabored. 0600 Patient called out to use the bsc. Two nurses at bedside, patient unsteady, but prefers to be independent. Patient voided tea color urine. Returned to bed. Administered scheduled IV compazine.   0700 Report given to Thomas Rdz RN.      NAME OF PATIENT:  Brittaney Keith    LEVEL OF CARE:  GIP    REASON FOR GIP:   Pain, despite numerous changes in medications, Nausea and vomiting, despite changes to medications, and Medication adjustment that must be monitored 24/7    *PATIENT REMAINS ELIGIBLE FOR GIP LEVEL OF CARE AS EVIDENCED BY: (MUST BE ADDRESSED OF PATIENT GIP)      REASON FOR RESPITE:  N/a    O2 SAFETY:  Concentrator positioning (6\" from furniture/drapes), No petroleum based products on face while oxygen in use, and Oxygen sign on the door    FALL INTERVENTIONS PROVIDED:   Implemented/recommended use of non-skid footwear, Implemented/recommended resources for alarm system (personal alarm, bed alarm, call bell, etc.) , and Implemented/recommended environmental changes (remove hazards, lower bed, improve lighting, etc.)    INTERDISPLINARY COMMUNICATION/COLLABORATION:  Physician, IGLESIA, Cristobal Betancur, and RN, CNA    NEW MEDICATION INITIATION DOCUMENTATION:  N/a    Reason medication is being initiated:  n/a    MD / Provider name consulted re: change in status / initiation of new medication:  n/a    New Symptom(s):  n/a    New Order(s):  n/a    Name of the person notified of the changes:  n/a    Name of person being taught:  n/a    Instructions given:  n/a    Side Effects taught:  n/a    Response to teaching:  n/a      COMFORTABLE DYING MEASURE:  Is Patient/family satisfied with symptom level?  yes    DISCHARGE PLAN:  EOL vs home

## 2023-03-12 NOTE — PROGRESS NOTES
1900 Report received from Maria Eugenia Alicia Verified PCA at bedside w/ Ladi Buchanan RN. Pt's visitors left, bed alarm turned on. Pt denies needs at this time, appears sleepy, delayed responding to questions. Visitors at bedside. Call light within reach. 2030 Pt resting quietly in bed. Visitors left for the evening. 2130 Patient resting in bed, eyes closed, respirations unlabored. 2230 Patient resting in bed, eyes closed, neutral facial expression. 2300 Patient resting in bed, eyes closed, respirations unlabored. 2345 Pt resting in bed, neutral facial expression. 0030 Administered scheduled compazine, see MAR. Pt endorses increased pain, encouraged PCA use. Pt declines to use bedside commode at this time, encouraged her to call when she feels the need to void. She verbalizes understanding, denies needs at this time. Call light within reach, bed alarm on.  0130 Patient resting in bed, eyes closed, respirations unlabored. 0230 Patient resting in bed, eyes closed, neutral facial expression. 0310 Patient resting in bed, eyes closed, respirations unlabored. 0350 Pt found to be sitting on the edge of the bed, states she needs to go to the bathroom. Assisted pt up to bedside commode w/ Winnie Gonzalez RN. Pt voided moderate amount of dark mona urine. Brief changed. Assisted pt back to bed, encouraged use of PCA button. Pt denies further needs at this time. Bed alarm on, call light within reach. 0430 Patient resting quietly in bed.  0500 Pt resting in bed, awake. Denies needs at this time. 0600 Pt resting quietly in bed.  0620 Scheduled compazine administered, see MAR. Pt denies need to use PCA button at this time, denies needs at this time. 0700 Report given to oncoming RN.           NAME OF PATIENT:  Malena Loving    LEVEL OF CARE:  GIP    REASON FOR GIP:   Medication adjustment that must be monitored 24/7 and Stabilizing treatment that cannot take place at home    *PATIENT REMAINS ELIGIBLE FOR GIP LEVEL OF CARE AS EVIDENCED BY: (MUST BE ADDRESSED OF PATIENT GIP) requiring IV medications to maintain symptom management for pain and N/V. Requiring frequent RN assessment and intervention.       REASON FOR RESPITE:  N/A    O2 SAFETY:  N/A    FALL INTERVENTIONS PROVIDED:   Implemented/recommended resources for alarm system (personal alarm, bed alarm, call bell, etc.)  and Implemented/recommended environmental changes (remove hazards, lower bed, improve lighting, etc.)    INTERDISPLINARY COMMUNICATION/COLLABORATION:  Physician, IGLESIA, Yaritza Knight, and RN, CNA    NEW MEDICATION INITIATION DOCUMENTATION:  N/A    Reason medication is being initiated:  N/A    MD / Provider name consulted re: change in status / initiation of new medication:  N/A    New Symptom(s):  N/A    New Order(s):  N/A    Name of the person notified of the changes:  N/A    Name of person being taught:  N/A    Instructions given:  N/A    Side Effects taught:  N/A    Response to teaching:  N/A      COMFORTABLE DYING MEASURE:  Is Patient/family satisfied with symptom level?  yes    DISCHARGE PLAN:  home

## 2023-03-12 NOTE — PROGRESS NOTES
Problem: Nausea/Vomiting (Adult)  Goal: *Absence of nausea/vomiting  Outcome: Progressing Towards Goal     Problem: Breathing Pattern - Ineffective  Goal: *Use of effective breathing techniques  Outcome: Progressing Towards Goal     Problem: Pain  Goal: *Control of acute pain  Outcome: Progressing Towards Goal     Problem: Pressure Injury - Risk of  Goal: *Prevention of pressure injury  Outcome: Progressing Towards Goal  Note: Pressure Injury Interventions:  Sensory Interventions: Assess changes in LOC, Check visual cues for pain, Float heels, Minimize linen layers    Moisture Interventions: Absorbent underpads, Maintain skin hydration (lotion/cream)    Activity Interventions: Pressure redistribution bed/mattress(bed type)    Mobility Interventions: Float heels, HOB 30 degrees or less, Pressure redistribution bed/mattress (bed type)    Nutrition Interventions: Document food/fluid/supplement intake    Friction and Shear Interventions: Apply protective barrier, creams and emollients, HOB 30 degrees or less, Lift sheet, Minimize layers                Problem: Grieving  Goal: *Able to express feelings of grief  Outcome: Progressing Towards Goal     Problem: Mood - Altered  Goal: *Alleviation of anxiety and depressive symptoms  Outcome: Progressing Towards Goal     Problem: Discharge Planning  Goal: *Participates in discharge planning  Outcome: Progressing Towards Goal     Problem: Pressure Injury - Risk of  Goal: *Prevention of pressure injury  Description: Document Amrkie Scale and appropriate interventions in the flowsheet.   Outcome: Progressing Towards Goal  Note: Pressure Injury Interventions:  Sensory Interventions: Assess changes in LOC, Check visual cues for pain, Float heels, Minimize linen layers    Moisture Interventions: Absorbent underpads, Maintain skin hydration (lotion/cream)    Activity Interventions: Pressure redistribution bed/mattress(bed type)    Mobility Interventions: Float heels, HOB 30 degrees or less, Pressure redistribution bed/mattress (bed type)    Nutrition Interventions: Document food/fluid/supplement intake    Friction and Shear Interventions: Apply protective barrier, creams and emollients, HOB 30 degrees or less, Lift sheet, Minimize layers                Problem: Falls - Risk of  Goal: *Absence of Falls  Description: Document Balta Fall Risk and appropriate interventions in the flowsheet.   Outcome: Progressing Towards Goal  Note: Fall Risk Interventions:

## 2023-03-12 NOTE — PROGRESS NOTES
Problem: Nausea/Vomiting (Adult)  Goal: *Absence of nausea/vomiting  Outcome: Progressing Towards Goal     Problem: Pain  Goal: *Control of acute pain  Outcome: Progressing Towards Goal     Problem: Pressure Injury - Risk of  Goal: *Prevention of pressure injury  Outcome: Progressing Towards Goal  Note: Pressure Injury Interventions:  Sensory Interventions: Assess changes in LOC, Check visual cues for pain, Float heels, Minimize linen layers    Moisture Interventions: Absorbent underpads, Maintain skin hydration (lotion/cream)    Activity Interventions: Pressure redistribution bed/mattress(bed type)    Mobility Interventions: Float heels, HOB 30 degrees or less, Pressure redistribution bed/mattress (bed type)    Nutrition Interventions: Document food/fluid/supplement intake    Friction and Shear Interventions: HOB 30 degrees or less, Lift sheet, Minimize layers, Apply protective barrier, creams and emollients

## 2023-03-13 NOTE — PROGRESS NOTES
[facility]  [phone]    Patient Name: Pierre Workman  YOB: 1955    Date of Provider Hospice Visit: 03/13/23    Level of Care:   [x] General Inpatient (GIP)    [] Routine   [] Respite    Current Location of Care:  [] [facility] [] [facility] [] [id number] [] [facility] [x] Hospice House THE [facility], patient referred from:  [] [facility] [] [facility] [] [address] [facility] [] [facility] [] Home [x] Other:     Date of Original Hospice Admission: 3/5/2023  Hospice Medical Director at time of admission: Dr. Whitney Jean Diagnosis: Stage IV ovarian cancer  Diagnoses RELATED to the terminal prognosis: nausea, vomiting, constipation, abdominal pain, carcinomatosis, hx TIA 2018  Other Diagnoses: depression, hypothyroidism      HOSPICE SUMMARY   Do not cut and paste chart information other than imaging findings    Pierre Workman is a 78y.o. year old who was admitted to [facility]. She has extensive history of ovarian cancer treatments beginning in January of 2020. S/p open debulking on 4/28/2020, long term chemo. Last given in July 2020 Patient had a CT scan 4/17/21 with multiple intraperitoneal nodules new from previous scan. The patient's principle diagnosis has resulted in ongoing abdominal pain, now with nausea that is difficult to control. She has been vomiting with intake of food or pills, only keeping down sips of water and tea this week. Refer to LCD     Functionally, the patient's Karnofsky and/or Palliative Performance Scale has declined over a period of months and is estimated at 40. The patient is dependent on the following ADLs:  She requires assistance for bathing    Objective information that support this patients limited prognosis includes: progression of disease by CT scan, unable to tolerate more chemo. Decline in PO intake, dysgeusia, anorexia, ongoing nausea. , uncontrolled abdominal pain    The patient/family chose comfort measures with the support of Hospice. HOSPICE DIAGNOSES   Active Symptoms:  1. Nausea and vomiting.  - not improved with home regimen  2. Constipation, difficulty managing at home  3. Cancer related abdominal pain, and unable to keep down liquid morphine at home  4. Stage IV ovarian cancer with peritoneal carcinomatosis     PLAN   Patient will [facility] level care as patient needs frequent nursing assessments, IV medication management-not tolerating anything orally at home and pain just not managed. Patient is now declining in the form of progressive confusion and associated irritability, particularly when waking from sleep which is also now more frequent. Chart reviewed from the weekend and patient definitely declining based on when I saw her on Friday. Showing evidence of confusion/decline. Pain management-Fentanyl PCA remains at 60 mcg/h basal and 50 mcg bolus dose every 15 minutes. Has required 3 as needed doses this morning but she also got up to take a shower. Nausea and vomiting-continue compazine to 10 mg every 6 and zofran prn. Much more effective than Zofran. Patient continues to only tolerate ice chips. Constipation-related to large tumor burden in her abdomen. Last bowel movement 2 weeks ago. Anxiety/delirium-patient previously reported Ativan caused nightmares. Alternatively we have versed 2 mg IV available every 15 min prn. No as needed doses needed. As needed Haldol added over the weekend as well. Plan will be reviewed with [facility] hospice team  Plan reviewed with patient and bedside nursing team.  Once again, patient's preference is to remain at the community hospice house and given the fact that we are unable to obtain IV medication in the home setting, patient will remain at the community hospice house under [facility] level care as she cannot tolerate oral medication without having nausea/vomiting and then significant symptom burden. Patient definitely a little more frustrated today.   May need to consider scheduling either Haldol or Versed if this becomes more symptomatic but for now she still is able to interact with friends that are visiting. Was able to meet with patient's sister both in the room and outside the room. Reviewed plan of care, medications, prognosis. She is very thankful to the hospice team.    Carlos Esteban and DELMA to support family needs  Disposition: anticipate ongoing decline, and death in the [address] Hospice Plan of care was reviewed in detail and agree with current plan of care    Discussed with bedside nurse and also with [facility] RN  Prognosis estimated based on 03/13/23 clinical assessment is:   [] Hours to Days    [x] Days to Weeks    [] Other:    Communicated plan of care with: Hospice Case Manager; Hospice IDT; Care Team     GOALS OF CARE     Patient/Medical POA stated Goal of Care: goal of care is comfort     [x] I have reviewed and/or updated ACP information in the Advance Care Planning Navigator. This information is available in the [facility] [address] link in the patient's chart header. Primary Decision [facility] Agent):   Primary Decision Maker: Lita Hogue - Daughter - [phone]    Secondary Decision Maker: John Price - Brother - [phone]    Resuscitation Status: DNR  If DNR is there a Durable DNR on file? : [x] Yes [] No (If no, complete Durable DNR)    HISTORY     History obtained from: patient, hospice nurse    CHIEF COMPLAINT: nausea, dysgeusia, vomiting, poor PO intake, constipation  The patient is:   [x] Verbal  [] Nonverbal  [] Unresponsive    HPI/SUBJECTIVE:    78year old female at home supported by [employer]. She has about one week of difficult to control symptoms. Fentanyl patch and liquid morphine for pain has not been effective as she is not able to keep the morphine down. The patch was changed to Q48 hrs when it was noted that the 3rd day was when she had more pain.   She has been using enemas and senokot (2 tabs BID) but still has not had a BM in the past 5 days (since Tuesday)  She is vomiting up meds or food but is able to keep down sips of water and tea. Pain is 4/10 right now across her abdomen, it is worse with ambulation  No real change in distention, some slight bloating since summertime    3/6-continues to describe diffuse pain in her abdomen. Even with just palpation of my stethoscope cause discomfort. Continuing not eating or drinking well    3/7-patient continues to have significant pain in her abdomen. Feels like her abdomen is more distended. 3/8-patient anxious but feels like pain is better managed overall. Still with nausea    3/9-patient appears more comfortable this morning. A little tearful at times. Nausea and pain seems somewhat improved    3/10-patient appears much more comfortable this morning. She actually was able to have a little bit of a smile. Still only tolerating ice chips. 3/11- patient drowsy but wakes from sleep, slowed processing evident, irritable today, major shift in personality per RN    3/13-patient arouses easily. Definitely struggling with processing and coming up with her words. A little bit more irritable today than when I saw her on Friday.        REVIEW OF SYSTEMS     The following systems were: [x] reviewed  [] unable to be reviewed    Positive ROS include:  Constitutional: fatigue, weakness, in pain especially with movement  Ears/nose/mouth/throat:   Respiratory:  Gastrointestinal:poor appetite, nausea, vomiting, abdominal pain, constipation - last BM Tuesday  Everything tastes horrible  Musculoskeletal:pain,   Neurologic:poor sleep, not more than 1-2 hours per night  Adult Non-Verbal Pain Assessment Score: not applicable, pt is verbal but had difficulty rating pain today-she is able to tell me her pain seems managed except when she was up and moving around    Face  [] 0   No particular expression or smile  [] 1   Occasional grimace, tearing, frowning, wrinkled forehead  [] 2   Frequent grimace, tearing, frowning, wrinkled forehead    Activity (movement)  [] 0   Lying quietly, normal position  [] 1   Seeking attention through movement or slow, cautious movement  [] 2   Restless, excessive activity and/or withdrawal reflexes    Guarding  [] 0   Lying quietly, no positioning of hands over areas of body  [] 1   Splinting areas of the body, tense  [] 2   Rigid, stiff    Physiology (vital signs)  [] 0   Stable vital signs  [] 1   Change in any of the following: SBP > 20mm Hg; HR > 20/minute  [] 2   Change in any of the following: SBP > 30mm Hg; HR > 25/minute    Respiratory  [] 0   Baseline RR/SpO2, compliant with ventilator  [] 1   RR > 10 above baseline, or 5% drop SpO2, mild asynchrony with ventilator  [] 2   RR > 20 above baseline, or 10% drop SpO2, asynchrony with ventilator     FUNCTIONAL ASSESSMENT     Palliative Performance Scale (PPS):50%       PSYCHOSOCIAL/SPIRITUAL ASSESSMENT     Active Problems:    Ovarian cancer ([facility] [address] (3/5/2023)    Past Medical History:   Diagnosis Date    Anxiety     Hypotension     Thyroid disease       No past surgical history on file. Social History     Tobacco Use    Smoking status: Not on file    Smokeless tobacco: Not on file   Substance Use Topics    Alcohol use: Not on file     No family history on file.    Allergies   Allergen Reactions    Sulfa (Sulfonamide Antibiotics) Diarrhea and Nausea and Vomiting      Current Facility-Administered Medications   Medication Dose Route Frequency    haloperidol lactate (HALDOL) injection 2 mg  2 mg IntraVENous Q4H PRN    prochlorperazine (COMPAZINE) injection 10 mg  10 mg IntraVENous Q6H    ondansetron (ZOFRAN) injection 4 mg  4 mg IntraVENous Q6H PRN    midazolam (VERSED) injection 2 mg  2 mg IntraVENous Q15MIN PRN    fentaNYL (PF) 1,500 mcg/30 mL PCA   IntraVENous TITRATE    bisacodyL (DULCOLAX) suppository 10 mg  10 mg Rectal DAILY PRN    glycopyrrolate (ROBINUL) injection 0.2 mg  0.2 mg IntraVENous Q4H PRN        PHYSICAL EXAM     Wt Readings from Last 3 Encounters:   12/08/22 66.7 kg (147 lb)   01/04/18 63.5 kg (140 lb)       Visit Vitals  BP (!) 112/58   Pulse 92   Temp 98.1 °F (36.7 °C)   Resp 16   SpO2 93%       Supplemental O2  [] Yes  [x] NO  Last bowel movement: 2 weeks ago    Currently this patient has:  [] Peripheral IV [] PICC  [x] PORT [] ICD    [] Sigala Catheter [] NG Tube   [] PEG Tube    [] Rectal Tube [] Drain  [] Other:     Constitutional: age appropriate female, lying in bed, awake, but slower to respond today, feels confused but aware of her confusion  Eyes: anicteric, EOMI  ENMT:   Cardiovascular: reg s1s2  Respiratory: clear anteriorly  Gastrointestinal: abdomen is distended, slightly more firm, hypoactive bowel sounds, expressed some discomfort in response to palpation around umbilicus  Musculoskeletal:nl  Skin:intact  Neurologic:speech fluent, insight more limited today, moves all 4, no tremor or rigidity  Psychiatric: normal affect, irritable      Pertinent Lab and or Imaging Tests:  Lab Results   Component Value Date/Time    Sodium 140 01/03/2018 11:59 AM    Potassium 3.7 01/03/2018 11:59 AM    Chloride 105 01/03/2018 11:59 AM    CO2 28 01/03/2018 11:59 AM    Anion gap 7 01/03/2018 11:59 AM    Glucose 96 01/03/2018 11:59 AM    BUN 15 01/03/2018 11:59 AM    Creatinine 0.80 01/03/2018 11:59 AM    BUN/Creatinine ratio 19 01/03/2018 11:59 AM    GFR est AA >60 01/03/2018 11:59 AM    GFR est non-AA >60 01/03/2018 11:59 AM    Calcium 8.9 01/03/2018 11:59 AM     Lab Results   Component Value Date/Time    Protein, total 7.6 01/03/2018 11:59 AM    Albumin 3.9 01/03/2018 11:59 AM           Total time:   Counseling / coordination time:   > 50% counseling / coordination?:

## 2023-03-13 NOTE — PROGRESS NOTES
0700  Report received from Jorge Luis, [address] [place] [address]  Verified PCA with ELIS Kang. Pt resting quietly in bed, ice water provided per request.  0815  Assisted pt to  commode to void, then to shower. Linens and clothing changed. Pt confused, irritable, no dyspnea noted. PCA button utilized before and after care. 0845  PCA syringe replaced and rate verified with Darrian Jacobs RN. No waste. 0945  Pt sitting up in bed visiting with friends. 1030  Responded to pt's call light. Pt unable to explain reason for call. Pt confused, appears uncomfortable. Utilized PCA button. Pt belching, hands at mouth. Nodded yes when asked if feeling nauseous. PRN Zofran administered. 1120  Medication effective. Pt sitting up in bed, remains confused, struggles to speak, but denies pain and nausea. 1220  Scheduled Compazine administered. [address] [place],[address] with Dr. Rishi Street and [place] RN. No new orders  1420  Pt transferred to [facility] commode to void with assistance. 1520  Pt resting quietly in bed, denies needs. 26  Pt visiting with family/friends at bedside. 1715   Pt transferred to [facility] commode to void with assistance. 1745  Pt sitting up in bed surrounded by family. Rates pain at 6/10, utilized PCA button for relief. 1800  Scheduled Compazine administered. Family left [facility]. Pt appears tired and confused. 1900  Pt sleeping. Report given to oncoming nurse.   PCA rate verified with Jing Ly RN.    NAME OF PATIENT:  Anette Aguilera    LEVEL OF CARE:  GIP    REASON FOR GIP:   Pain, despite numerous changes in medications, Nausea and vomiting, despite changes to medications, Medication adjustment that must be monitored 24/7, and Stabilizing treatment that cannot take place at home    *PATIENT REMAINS ELIGIBLE FOR GIP LEVEL OF CARE AS EVIDENCED BY: (MUST BE ADDRESSED OF PATIENT GIP)      REASON FOR RESPITE:  NA    O2 SAFETY:  Concentrator positioning (6\" from furniture/drapes), Tanks stored in sánchez , No petroleum based products on face while oxygen in use, and Oxygen sign on the door    FALL INTERVENTIONS PROVIDED:   Implemented/recommended use of non-skid footwear, Implemented/recommended use of fall risk identification flag to all team members, Implemented/recommended assistive devices and encouraged their use, Implemented/recommended resources for alarm system (personal alarm, bed alarm, call bell, etc.) , Implemented/recommended environmental changes (remove hazards, lower bed, improve lighting, etc.), and Implemented/recommended increased supervision/assistance    INTERDISPLINARY COMMUNICATION/COLLABORATION:  Physician, IGLESIA, Cecilia Charles, and RN, JON    NEW MEDICATION INITIATION DOCUMENTATION:  NA    Reason medication is being initiated:  NA    MD / Provider name consulted re: change in status / initiation of new medication:  NA    New Symptom(s):  NA    New Order(s):  NA    Name of the person notified of the changes:  NA    Name of person being taught:  NA    Instructions given:  NA    Side Effects taught:  NA    Response to teaching:  NA    COMFORTABLE DYING MEASURE:  Is Patient/family satisfied with symptom level?  yes    DISCHARGE PLAN:  end of life vs return home if symptoms managed.

## 2023-03-13 NOTE — PROGRESS NOTES
Problem: Nausea/Vomiting (Adult)  Goal: *Absence of nausea/vomiting  Outcome: Progressing Towards Goal  Goal: *Palliation of nausea/vomiting (Palliative Care)  Outcome: Progressing Towards Goal     Problem: Patient Education: Go to Patient Education Activity  Goal: Patient/Family Education  Outcome: Progressing Towards Goal     Problem: Breathing Pattern - Ineffective  Goal: *Use of effective breathing techniques  Outcome: Progressing Towards Goal     Problem: Pain  Goal: *Control of acute pain  Outcome: Progressing Towards Goal     Problem: Pressure Injury - Risk of  Goal: *Prevention of pressure injury  Outcome: Progressing Towards Goal  Note: Pressure Injury Interventions:  Sensory Interventions: Assess changes in LOC, Check visual cues for pain, Keep linens dry and wrinkle-free, Minimize linen layers, Pressure redistribution bed/mattress (bed type)    Moisture Interventions: Absorbent underpads, Minimize layers    Activity Interventions: Pressure redistribution bed/mattress(bed type)    Mobility Interventions: Pressure redistribution bed/mattress (bed type)    Nutrition Interventions: Document food/fluid/supplement intake, Offer support with meals,snacks and hydration    Friction and Shear Interventions: Minimize layers          Problem: Grieving  Goal: *Able to express feelings of grief  Outcome: Progressing Towards Goal  Goal: *Able to identify stages of grieving process  Outcome: Progressing Towards Goal     Problem: Mood - Altered  Goal: *Alleviation of anxiety and depressive symptoms  Outcome: Progressing Towards Goal     Problem: Discharge Planning  Goal: *Participates in discharge planning  Outcome: Progressing Towards Goal     Problem: Patient Education: Go to Patient Education Activity  Goal: Patient/Family Education  Outcome: Progressing Towards Goal     Problem: Pressure Injury - Risk of  Goal: *Prevention of pressure injury  Description: Document Markie Scale and appropriate interventions in the flowsheet. Outcome: Progressing Towards Goal  Note: Pressure Injury Interventions:  Sensory Interventions: Assess changes in LOC, Check visual cues for pain, Keep linens dry and wrinkle-free, Minimize linen layers, Pressure redistribution bed/mattress (bed type)    Moisture Interventions: Absorbent underpads, Minimize layers    Activity Interventions: Pressure redistribution bed/mattress(bed type)    Mobility Interventions: Pressure redistribution bed/mattress (bed type)    Nutrition Interventions: Document food/fluid/supplement intake, Offer support with meals,snacks and hydration    Friction and Shear Interventions: Minimize layers       Problem: Patient Education: Go to Patient Education Activity  Goal: Patient/Family Education  Outcome: Progressing Towards Goal     Problem: Falls - Risk of  Goal: *Absence of Falls  Description: Document Balta Fall Risk and appropriate interventions in the flowsheet.   Outcome: Progressing Towards Goal     Problem: Patient Education: Go to Patient Education Activity  Goal: Patient/Family Education  Outcome: Progressing Towards Goal     Problem: Infection - Risk of, Central Venous Catheter-Associated Bloodstream Infection  Goal: *Absence of infection signs and symptoms  Outcome: Progressing Towards Goal

## 2023-03-13 NOTE — PROGRESS NOTES
1900-Handoff report received from Lina Liriano, [facility].   1935-Fentanyl PCA pump rate verified with Lina Liriano, RN  1940-Patient sitting up in bed with family at bedside visiting and speaking on the phone. 2000-Patient resting quietly in bed with visitor at bedside. 2100-Patient resting quietly in bed with eyes closed softly snoring. 2200-Patient resting quietly in bed with eyes closed. No signs or discomfort or distress. 2300-Patient resting quietly in bed with eyes closed. 0000-Scheduled IV Compazine administered   0100-Patient resting quietly in bed with eyes closed. 0115-Assisted patient to the [facility] to urinate. 0200-Patient sitting up in bed with eyes open drinking water.    0300-Patient resting quietly in bed with eyes open  0400-Patient resting quietly in bed with eyes closed snoring softly   0445-Changed Fentanyl PCA syringe with 2nd RN   0500-Patient resting quietly in bed with eyes open   0515-Changed port dressing  0600-Scheduled IV Compazine administered   0700-Handoff report given to Johanna Edgar RN    NAME OF PATIENT:  Albin Melgar    LEVEL OF CARE:  GIP    REASON FOR GIP:   Nausea and vomiting, despite changes to medications and Terminal agitation, despite changes to medications    *PATIENT REMAINS ELIGIBLE FOR [facility] LEVEL OF CARE AS EVIDENCED BY: (MUST BE ADDRESSED OF PATIENT GIP)      REASON FOR RESPITE:  N/A    O2 SAFETY:  PRN    FALL INTERVENTIONS PROVIDED:   Implemented/recommended use of non-skid footwear, Implemented/recommended use of fall risk identification flag to all team members, Implemented/recommended assistive devices and encouraged their use, Implemented/recommended resources for alarm system (personal alarm, bed alarm, call bell, etc.) , Implemented/recommended environmental changes (remove hazards, lower bed, improve lighting, etc.), and Implemented/recommended increased supervision/assistance    INTERDISPLINARY COMMUNICATION/COLLABORATION:  Physician, MSW, Maverick Lopez, and RN, JON ZAVALA MEDICATION INITIATION DOCUMENTATION:  N/A    Reason medication is being initiated:  N/A    MD / Provider name consulted re: change in status / initiation of new medication:  N/A    New Symptom(s):  N/A    New Order(s):  N/A    Name of the person notified of the changes:  N/A    Name of person being taught:  N/A    Instructions given:  N/A    Side Effects taught:  N/A    Response to teaching:  N/A      COMFORTABLE DYING MEASURE:  Is Patient/family satisfied with symptom level?  yes    DISCHARGE PLAN: EOL

## 2023-03-13 NOTE — PROGRESS NOTES
Problem: Nausea/Vomiting (Adult)  Goal: *Absence of nausea/vomiting  Outcome: Progressing Towards Goal     Problem: Pain  Goal: *Control of acute pain  Outcome: Progressing Towards Goal     Problem: Pressure Injury - Risk of  Goal: *Prevention of pressure injury  Outcome: Progressing Towards Goal  Note: Pressure Injury Interventions:  Sensory Interventions: (P) Assess changes in LOC, Check visual cues for pain, Keep linens dry and wrinkle-free, Minimize linen layers    Moisture Interventions: (P) Absorbent underpads, Minimize layers    Activity Interventions: (P) Pressure redistribution bed/mattress(bed type)    Mobility Interventions: (P) Pressure redistribution bed/mattress (bed type)    Nutrition Interventions: Document food/fluid/supplement intake, Offer support with meals,snacks and hydration    Friction and Shear Interventions: Minimize layers                Problem: Falls - Risk of  Goal: *Absence of Falls  Description: Document Balta Fall Risk and appropriate interventions in the flowsheet.   Outcome: Progressing Towards Goal  Note: Fall Risk Interventions:

## 2023-03-14 NOTE — PROGRESS NOTES
Problem: Nausea/Vomiting (Adult)  Goal: *Absence of nausea/vomiting  Outcome: Progressing Towards Goal  Goal: *Palliation of nausea/vomiting (Palliative Care)  Outcome: Progressing Towards Goal     Problem: Patient Education: Go to Patient Education Activity  Goal: Patient/Family Education  Outcome: Progressing Towards Goal     Problem: Breathing Pattern - Ineffective  Goal: *Use of effective breathing techniques  Outcome: Progressing Towards Goal     Problem: Pain  Goal: *Control of acute pain  Outcome: Progressing Towards Goal     Problem: Pressure Injury - Risk of  Goal: *Prevention of pressure injury  Outcome: Progressing Towards Goal  Note: Pressure Injury Interventions:  Sensory Interventions: Assess changes in LOC, Avoid rigorous massage over bony prominences, Check visual cues for pain, Float heels, Keep linens dry and wrinkle-free, Minimize linen layers, Pad between skin to skin, Pressure redistribution bed/mattress (bed type)    Moisture Interventions: Absorbent underpads, Apply protective barrier, creams and emollients, Check for incontinence Q2 hours and as needed, Maintain skin hydration (lotion/cream), Minimize layers, Moisture barrier    Activity Interventions: Pressure redistribution bed/mattress(bed type)    Mobility Interventions: Float heels, HOB 30 degrees or less, Pressure redistribution bed/mattress (bed type)    Nutrition Interventions: Document food/fluid/supplement intake, Offer support with meals,snacks and hydration    Friction and Shear Interventions: Apply protective barrier, creams and emollients, HOB 30 degrees or less, Minimize layers                Problem: Grieving  Goal: *Able to express feelings of grief  Outcome: Progressing Towards Goal  Goal: *Able to identify stages of grieving process  Outcome: Progressing Towards Goal     Problem: Mood - Altered  Goal: *Alleviation of anxiety and depressive symptoms  Outcome: Progressing Towards Goal     Problem: Discharge Planning  Goal: *Participates in discharge planning  Outcome: Progressing Towards Goal     Problem: Falls - Risk of  Goal: *Absence of Falls  Description: Document Alisha Mason Fall Risk and appropriate interventions in the flowsheet.   Outcome: Progressing Towards Goal  Note: Fall Risk Interventions:  Mobility Interventions: Bed/chair exit alarm, Communicate number of staff needed for ambulation/transfer, Patient to call before getting OOB    Mentation Interventions: Adequate sleep, hydration, pain control, Bed/chair exit alarm, Door open when patient unattended, More frequent rounding, Room close to nurse's station         Elimination Interventions: Bed/chair exit alarm, Call light in reach, Patient to call for help with toileting needs, Stay With Me (per policy), Toileting schedule/hourly rounds

## 2023-03-14 NOTE — PROGRESS NOTES
0700- Report received from [place], [facility], [facility]. Care assumed. [facility]. Dx: Metastatic Ovarian Cancer. DNR  0715- Fentanyl PCA settings reviewed and compared to orders with Mac Champion RN. Pt rating pain 6/10 at this time, states its localized to abdomen. Encouraged pt to utilize PCA button as often as needed- demonstrated proper use. Pt denied breakthrough pain medication at this time. 0730- Shift assessment completed. Pt on room air. Pt alert and oriented, however, periods of confusion vs. delayed processing noted. Pt needs to process information a bit longer and speech is delayed as well. Can essentially get thoughts and words out appropriately if can ample time. R chest port intact and patent. Pt sipping water and a coke with no nausea. 0740- Pt assisted to [facility] to void. Pt had no dyspnea or dizziness. Able to pivot a short distance with assistance x 1, then back to bed. 0840- Pt lying in bed, looking out the window. Pt states she is \"in a good place right now\". Call light and PCA button in reach. Bed alarm on.  0940- Pt resting in bed with eyes closed. Brow relaxed. Respirations even and unlabored. 1040- Pt sitting up in bed, visiting with two friends. Pt is smiling and rates pain 3-4/10 at this time. Denies nausea. 2146- Dr. Brian Yeung at bedside to evaluate pt  and discuss POC with RN and pt.   1146- Scheduled Compazine 10 mg IV administered. Pt is very drowsy and like to take a nap. 1345- Pt sitting up in bed visiting with a friend. Pt denies breakthrough pain or nausea. Encouraged to use PCA button. 6 Cecil Phan at bedside to evaluate pt. Provided an update and recent MAR.  1500- Pt up to [facility] to void with assistance x 1, then back to bed. Tolerated fairly. 1600- Pt visiting with friend at bedside. 1700- Pt states pain is \"ok\" at this time. Rates 4/10. Pt appears relaxed. 1800- Pt declined water and denies pain at this time. 1900- Report given to Analilia Gray RN.  Fentanyl PCA settings reviewed at bedside with oncoming RN.     NAME OF PATIENT:  Liam Blanchard     LEVEL OF CARE:  GIP     REASON FOR GIP:   Nausea and vomiting, despite changes to medications and Terminal agitation, despite changes to medications     *PATIENT REMAINS ELIGIBLE FOR GIP LEVEL OF CARE AS EVIDENCED BY: (MUST BE ADDRESSED OF PATIENT GIP)        REASON FOR RESPITE:  N/A     O2 SAFETY:  PRN     FALL INTERVENTIONS PROVIDED:   Implemented/recommended use of non-skid footwear, Implemented/recommended use of fall risk identification flag to all team members, Implemented/recommended assistive devices and encouraged their use, Implemented/recommended resources for alarm system (personal alarm, bed alarm, call bell, etc.) , Implemented/recommended environmental changes (remove hazards, lower bed, improve lighting, etc.), and Implemented/recommended increased supervision/assistance     INTERDISPLINARY COMMUNICATION/COLLABORATION:  Physician, IGLESIA, Richard Blanco, and RN, CNA     NEW MEDICATION INITIATION DOCUMENTATION:  N/A     Reason medication is being initiated:  N/A     MD / Provider name consulted re: change in status / initiation of new medication:  N/A     New Symptom(s):  N/A     New Order(s):  N/A     Name of the person notified of the changes:  N/A     Name of person being taught:  N/A     Instructions given:  N/A     Side Effects taught:  N/A     Response to teaching:  N/A        COMFORTABLE DYING MEASURE:  Is Patient/family satisfied with symptom level?  yes     DISCHARGE PLAN: EOL

## 2023-03-14 NOTE — PROGRESS NOTES
Problem: Nausea/Vomiting (Adult)  Goal: *Absence of nausea/vomiting  Outcome: Progressing Towards Goal  Goal: *Palliation of nausea/vomiting (Palliative Care)  Outcome: Progressing Towards Goal     Problem: Patient Education: Go to Patient Education Activity  Goal: Patient/Family Education  Outcome: Progressing Towards Goal     Problem: Breathing Pattern - Ineffective  Goal: *Use of effective breathing techniques  Outcome: Progressing Towards Goal     Problem: Pain  Goal: *Control of acute pain  Outcome: Progressing Towards Goal     Problem: Pressure Injury - Risk of  Goal: *Prevention of pressure injury  Outcome: Progressing Towards Goal  Note: Pressure Injury Interventions:  Sensory Interventions: Assess changes in LOC, Keep linens dry and wrinkle-free, Maintain/enhance activity level, Minimize linen layers, Pad between skin to skin, Use 30-degree side-lying position    Moisture Interventions: Absorbent underpads, Limit adult briefs    Activity Interventions: Pressure redistribution bed/mattress(bed type)    Mobility Interventions: Float heels, HOB 30 degrees or less, Pressure redistribution bed/mattress (bed type)    Nutrition Interventions: Document food/fluid/supplement intake    Friction and Shear Interventions: Apply protective barrier, creams and emollients, HOB 30 degrees or less, Minimize layers                Problem: Grieving  Goal: *Able to express feelings of grief  Outcome: Progressing Towards Goal  Goal: *Able to identify stages of grieving process  Outcome: Progressing Towards Goal     Problem: Mood - Altered  Goal: *Alleviation of anxiety and depressive symptoms  Outcome: Progressing Towards Goal     Problem: Discharge Planning  Goal: *Participates in discharge planning  Outcome: Progressing Towards Goal     Problem: Patient Education: Go to Patient Education Activity  Goal: Patient/Family Education  Outcome: Progressing Towards Goal     Problem: Pressure Injury - Risk of  Goal: *Prevention of pressure injury  Description: Document Markie Scale and appropriate interventions in the flowsheet. Outcome: Progressing Towards Goal  Note: Pressure Injury Interventions:  Sensory Interventions: Assess changes in LOC, Keep linens dry and wrinkle-free, Maintain/enhance activity level, Minimize linen layers, Pad between skin to skin, Use 30-degree side-lying position    Moisture Interventions: Absorbent underpads, Limit adult briefs    Activity Interventions: Pressure redistribution bed/mattress(bed type)    Mobility Interventions: Float heels, HOB 30 degrees or less, Pressure redistribution bed/mattress (bed type)    Nutrition Interventions: Document food/fluid/supplement intake    Friction and Shear Interventions: Apply protective barrier, creams and emollients, HOB 30 degrees or less, Minimize layers                Problem: Patient Education: Go to Patient Education Activity  Goal: Patient/Family Education  Outcome: Progressing Towards Goal     Problem: Falls - Risk of  Goal: *Absence of Falls  Description: Document Balta Fall Risk and appropriate interventions in the flowsheet.   Outcome: Progressing Towards Goal  Note: Fall Risk Interventions:  Mobility Interventions: Bed/chair exit alarm, Communicate number of staff needed for ambulation/transfer, Patient to call before getting OOB    Mentation Interventions: Adequate sleep, hydration, pain control, Bed/chair exit alarm, Door open when patient unattended, More frequent rounding, Room close to nurse's station         Elimination Interventions: Bed/chair exit alarm, Call light in reach, Patient to call for help with toileting needs, Stay With Me (per policy), Toileting schedule/hourly rounds              Problem: Patient Education: Go to Patient Education Activity  Goal: Patient/Family Education  Outcome: Progressing Towards Goal     Problem: Infection - Risk of, Central Venous Catheter-Associated Bloodstream Infection  Goal: *Absence of infection signs and symptoms  Outcome: Progressing Towards Goal

## 2023-03-14 NOTE — PROGRESS NOTES
[facility] Help to Those in Need  [phone]    Patient Name: Merry Lam  YOB: 1955    Date of Provider Hospice Visit: 03/14/23    Level of Care:   [x] General Inpatient (GIP)    [] Routine   [] Respite    Current Location of Care:  [] [facility] [] [facility] [] [facility] [] [facility] [x] Hospice House [facility], patient referred from:  [] [facility] [] [facility] [] [facility] [] [facility] [] Home [x] Other:     Date of Original Hospice Admission: 3/5/2023  Hospice Medical Director at time of admission: Dr. Rain Garcia Diagnosis: Stage IV ovarian cancer  Diagnoses RELATED to the terminal prognosis: nausea, vomiting, constipation, abdominal pain, carcinomatosis, hx TIA 2018  Other Diagnoses: depression, hypothyroidism      HOSPICE SUMMARY   Do not cut and paste chart information other than imaging findings    Merry Lam is a 78y.o. year old who was admitted to [address] [place] [address]. She has extensive history of ovarian cancer treatments beginning in January of 2020. S/p open debulking on 4/28/2020, long term chemo. Last given in July 2020 Patient had a CT scan 4/17/21 with multiple intraperitoneal nodules new from previous scan. The patient's principle diagnosis has resulted in ongoing abdominal pain, now with nausea that is difficult to control. She has been vomiting with intake of food or pills, only keeping down sips of water and tea this week. Refer to LCD     Functionally, the patient's Karnofsky and/or Palliative Performance Scale has declined over a period of months and is estimated at 40. The patient is dependent on the following ADLs:  She requires assistance for bathing    Objective information that support this patients limited prognosis includes: progression of disease by CT scan, unable to tolerate more chemo. Decline in PO intake, dysgeusia, anorexia, ongoing nausea. , uncontrolled abdominal pain    The patient/family chose comfort measures with the support of Hospice. HOSPICE DIAGNOSES   Active Symptoms:  1. Nausea and vomiting.  - not improved with home regimen  2. Constipation, difficulty managing at home  3. Cancer related abdominal pain, and unable to keep down liquid morphine at home  4. Stage IV ovarian cancer with peritoneal carcinomatosis     PLAN   Patient will continue GIP level care as patient needs frequent nursing assessments, IV medication management-not tolerating anything orally at home and pain just not managed. Patient continues show evidence of further decline. She is showing some evidence of confusion, slow to respond, little bit of agitation at times. She required 3 as needed doses of her fentanyl PCA overnight. Had a little bit of ice chips and Coca-Cola this morning. Pain management-Fentanyl PCA remains at 60 mcg/h basal and 50 mcg bolus dose every 15 minutes. Has required 3 as needed last night  Nausea and vomiting-continue compazine to 10 mg every 6 and zofran prn. Much more effective than Zofran. Compazine remains helpful patient continues to only tolerate ice chips. Constipation-related to large tumor burden in her abdomen. Last bowel movement 2 weeks ago but do not anticipate any further stool given her cancer  Anxiety/delirium-patient previously reported Ativan caused nightmares. Alternatively we have versed 2 mg IV available every 15 min prn. No as needed doses needed. As needed Haldol also available  Plan will be reviewed with [facility] hospice team  Plan reviewed with patient and bedside nursing team.  Once again, patient's preference is to remain at the community hospice house and given the fact that we are unable to obtain IV medication in the home setting, patient will remain at the community hospice house under [facility] level care as she cannot tolerate oral medication without having nausea/vomiting and then significant symptom burden.     and SW to support family needs  Disposition: anticipate ongoing decline, and death in the [facility]. Hospice Plan of care was reviewed in detail and agree with current plan of care    Discussed with bedside nurse and also with [facility] RN  Prognosis estimated based on 03/14/23 clinical assessment is:   [] Hours to Days    [x] Days to Weeks    [] Other:    Communicated plan of care with: Hospice Case Manager; Hospice IDT; Care Team     GOALS OF CARE     Patient/Medical POA stated Goal of Care: goal of care is comfort     [x] I have reviewed and/or updated ACP information in the Advance Care Planning Navigator. This information is available in the [address] link in the patient's chart header. Primary Decision [facility] Agent):   Primary Decision Maker: Delfina Ching - Daughter - [phone]    Secondary Decision Maker: Bebeto Bartholomew - Brother - [phone]    Resuscitation Status: DNR  If DNR is there a Durable DNR on file? : [x] Yes [] No (If no, complete Durable DNR)    HISTORY     History obtained from: patient, hospice nurse    CHIEF COMPLAINT: nausea, dysgeusia, vomiting, poor PO intake, constipation  The patient is:   [x] Verbal  [] Nonverbal  [] Unresponsive    HPI/SUBJECTIVE:    78year old female at home supported by [employer]. She has about one week of difficult to control symptoms. Fentanyl patch and liquid morphine for pain has not been effective as she is not able to keep the morphine down. The patch was changed to Q48 hrs when it was noted that the 3rd day was when she had more pain. She has been using enemas and senokot (2 tabs BID) but still has not had a BM in the past 5 days (since Tuesday)  She is vomiting up meds or food but is able to keep down sips of water and tea. Pain is 4/10 right now across her abdomen, it is worse with ambulation  No real change in distention, some slight bloating since summertime    3/6-continues to describe diffuse pain in her abdomen. Even with just palpation of my stethoscope cause discomfort. Continuing not eating or drinking well    3/7-patient continues to have significant pain in her abdomen. Feels like her abdomen is more distended. 3/8-patient anxious but feels like pain is better managed overall. Still with nausea    3/9-patient appears more comfortable this morning. A little tearful at times. Nausea and pain seems somewhat improved    3/10-patient appears much more comfortable this morning. She actually was able to have a little bit of a smile. Still only tolerating ice chips. 3/11- patient drowsy but wakes from sleep, slowed processing evident, irritable today, major shift in personality per RN    3/13-patient arouses easily. Definitely struggling with processing and coming up with her words. A little bit more irritable today than when I saw her on Friday. 3/14-patient appears little more ashen. A little slow to respond. Definitely getting weaker.   Discussed the possibility of a Sigala catheter and she will think about it       REVIEW OF SYSTEMS     The following systems were: [x] reviewed  [] unable to be reviewed    Positive ROS include:  Constitutional: fatigue, weakness, in pain especially with movement  Ears/nose/mouth/throat:   Respiratory:  Gastrointestinal:poor appetite, nausea, vomiting, abdominal pain, constipation - last BM Tuesday  Everything tastes horrible  Musculoskeletal:pain,   Neurologic:poor sleep, not more than 1-2 hours per night  Adult Non-Verbal Pain Assessment Score: not applicable, pt is verbal but had difficulty rating pain today-states having some discomfort in her lower abdomen this morning    Face  [] 0   No particular expression or smile  [] 1   Occasional grimace, tearing, frowning, wrinkled forehead  [] 2   Frequent grimace, tearing, frowning, wrinkled forehead    Activity (movement)  [] 0   Lying quietly, normal position  [] 1   Seeking attention through movement or slow, cautious movement  [] 2   Restless, excessive activity and/or withdrawal reflexes    Guarding  [] 0   Lying quietly, no positioning of hands over areas of body  [] 1   Splinting areas of the body, tense  [] 2   Rigid, stiff    Physiology (vital signs)  [] 0   Stable vital signs  [] 1   Change in any of the following: SBP > 20mm Hg; HR > 20/minute  [] 2   Change in any of the following: SBP > 30mm Hg; HR > 25/minute    Respiratory  [] 0   Baseline RR/SpO2, compliant with ventilator  [] 1   RR > 10 above baseline, or 5% drop SpO2, mild asynchrony with ventilator  [] 2   RR > 20 above baseline, or 10% drop SpO2, asynchrony with ventilator     FUNCTIONAL ASSESSMENT     Palliative Performance Scale (PPS):40       PSYCHOSOCIAL/SPIRITUAL ASSESSMENT     Active Problems:    Ovarian cancer ([facility] [address] (3/5/2023)    Past Medical History:   Diagnosis Date    Anxiety     Hypotension     Thyroid disease       No past surgical history on file. Social History     Tobacco Use    Smoking status: Not on file    Smokeless tobacco: Not on file   Substance Use Topics    Alcohol use: Not on file     No family history on file.    Allergies   Allergen Reactions    Sulfa (Sulfonamide Antibiotics) Diarrhea and Nausea and Vomiting      Current Facility-Administered Medications   Medication Dose Route Frequency    haloperidol lactate (HALDOL) injection 2 mg  2 mg IntraVENous Q4H PRN    prochlorperazine (COMPAZINE) injection 10 mg  10 mg IntraVENous Q6H    ondansetron (ZOFRAN) injection 4 mg  4 mg IntraVENous Q6H PRN    midazolam (VERSED) injection 2 mg  2 mg IntraVENous Q15MIN PRN    fentaNYL (PF) 1,500 mcg/30 mL PCA   IntraVENous TITRATE    bisacodyL (DULCOLAX) suppository 10 mg  10 mg Rectal DAILY PRN    glycopyrrolate (ROBINUL) injection 0.2 mg  0.2 mg IntraVENous Q4H PRN        PHYSICAL EXAM     Wt Readings from Last 3 Encounters:   12/08/22 66.7 kg (147 lb)   01/04/18 63.5 kg (140 lb)       Visit Vitals  /64   Pulse 92   Temp (!) 96.3 °F (35.7 °C)   Resp 18   SpO2 93%       Supplemental O2  [] Yes  [x] NO  Last bowel movement: 2 weeks ago    Currently this patient has:  [] Peripheral IV [] PICC  [x] PORT [] ICD    [] Sigala Catheter [] NG Tube   [] PEG Tube    [] Rectal Tube [] Drain  [] Other:     Constitutional: age appropriate female, lying in bed, awake, but slower to respond today, a little more ashen, slow to respond at times  Eyes: anicteric, EOMI  ENMT:   Cardiovascular: reg s1s2  Respiratory: clear anteriorly  Gastrointestinal: abdomen is distended, slightly more firm, hypoactive bowel sounds, expressed some discomfort in response to palpation around umbilicus  Musculoskeletal:nl  Skin:intact  Neurologic:speech fluent, insight more limited today, moves all 4, no tremor or rigidity  Psychiatric: normal affect, irritable      Pertinent Lab and or Imaging Tests:  Lab Results   Component Value Date/Time    Sodium 140 01/03/2018 11:59 AM    Potassium 3.7 01/03/2018 11:59 AM    Chloride 105 01/03/2018 11:59 AM    CO2 28 01/03/2018 11:59 AM    Anion gap 7 01/03/2018 11:59 AM    Glucose 96 01/03/2018 11:59 AM    BUN 15 01/03/2018 11:59 AM    Creatinine 0.80 01/03/2018 11:59 AM    BUN/Creatinine ratio 19 01/03/2018 11:59 AM    GFR est AA >60 01/03/2018 11:59 AM    GFR est non-AA >60 01/03/2018 11:59 AM    Calcium 8.9 01/03/2018 11:59 AM     Lab Results   Component Value Date/Time    Protein, total 7.6 01/03/2018 11:59 AM    Albumin 3.9 01/03/2018 11:59 AM           Total time:   Counseling / coordination time:   > 50% counseling / coordination?:

## 2023-03-15 NOTE — PROGRESS NOTES
0700:  Report received from [address]. Patient awake in bed, agitated, confused. Patient requests to not have anyone in room until she speaks to Dr. Martin Service when he comes in. Reminded patient to use her call bell to call for assistance if she needs me  0730: Patient asking for assistance to call her sister. Patient called sister but no answer. Patient tearful, fearful, attempted to provide support and comfort, but patient became increasingly agitated. 3631: Dr. Martin Service at the bedside with patient. Patient confused, tearful, expressing fear. PRN midazolam 2mg administered. 0830:  Patient remains resting in bed, reports feeling \"much better\" after medication. Patient is calm and does not appear agitated. 0845: Patient remains calm, requesting ice chips and water refilled. 12: Patient's friend at the bedside. 0900: Patient's sister at the bedside. Patient appears calm and cooperative at this time. 0945: Legacy nurse at the bedside with patient and sister. 1030: Patient resting in bed with sister at the bedside, Patient slow to respond, having difficulty gathering her thoughts, but was able to share that she feels that her pain and nausea are well controlled, appears mildly anxious. 1130: Patient resting in bed quietly, awake with family at the bedside  1230: Patient resting in bed quietly with eyes closed, appears sleeping. 1330: Assisted patient to bedside commode, patient is x1 assist but not accepting of assistance. Patient agitated with any assistance. Patient's sister at the bedside  [phone]: PRN versed 2mg IV administered for agitation. 1400: Patient' daughter, Eileen Cardoza, and sister, Briana Luke, at the bedside. Patient resting with eyes closed, appears sleeping   1500: Patient's daughter leaving, patient remains sleeping  1615: Patient sleeping with sister, Dee Dee Zambrano, at the bedside  1730: Patient's brother and sister in law at the bedside to sit with patient.  Patient's friend, Umer Mcclendon, allowed to visit this evening at the bedside per the family  1805: Scheduled compazine 1-mg IV administered. Patient resting in bed quietly with family at the bedside, awake but drowsy. 1845: Patient resting quietly in bed, talking to her brother at the bedside. Patient has no needs at this time. 1900: Report given to oncoming nurse.

## 2023-03-15 NOTE — PROGRESS NOTES
[facility] Those [facility]  [phone]    Patient Name: Cassidy Dozier  YOB: 1955    Date of Provider Hospice Visit: 03/15/23    Level of Care:   [x] General Inpatient (GIP)    [] Routine   [] Respite    Current Location of Care:  [] [address] [place] [address] [] [facility] [] [facility] [] [facility] [x] Hospice House [facility], patient referred from:  [] [address] [place] [address] [] [facility] [] [facility] [] [facility] [] Home [x] Other:     Date of Original Hospice Admission: 3/5/2023  Hospice Medical Director at time of admission: Dr. Mora Alicia Diagnosis: Stage IV ovarian cancer  Diagnoses RELATED to the terminal prognosis: nausea, vomiting, constipation, abdominal pain, carcinomatosis, hx TIA 2018  Other Diagnoses: depression, hypothyroidism      HOSPICE SUMMARY   Do not cut and paste chart information other than imaging findings    Cassidy Dozier is a 78y.o. year old who was admitted to [facility]. She has extensive history of ovarian cancer treatments beginning in January of 2020. S/p open debulking on 4/28/2020, long term chemo. Last given in July 2020 Patient had a CT scan 4/17/21 with multiple intraperitoneal nodules new from previous scan. The patient's principle diagnosis has resulted in ongoing abdominal pain, now with nausea that is difficult to control. She has been vomiting with intake of food or pills, only keeping down sips of water and tea this week. Refer to LCD     Functionally, the patient's Karnofsky and/or Palliative Performance Scale has declined over a period of months and is estimated at 40. The patient is dependent on the following ADLs:  She requires assistance for bathing    Objective information that support this patients limited prognosis includes: progression of disease by CT scan, unable to tolerate more chemo. Decline in PO intake, dysgeusia, anorexia, ongoing nausea. , uncontrolled abdominal pain    The patient/family chose comfort measures with the support of Hospice. HOSPICE DIAGNOSES   Active Symptoms:  1. Nausea and vomiting.  - not improved with home regimen  2. Constipation, difficulty managing at home  3. Cancer related abdominal pain, and unable to keep down liquid morphine at home  4. Stage IV ovarian cancer with peritoneal carcinomatosis     PLAN   Patient will continue [facility] level care as patient needs frequent nursing assessments, IV medication management-not tolerating anything orally at home and pain just not managed. Patient continues show evidence of further decline. Patient very anxious and tearful this morning. She seems scared  Pain management-Fentanyl PCA remains at 60 mcg/h basal and 50 mcg bolus dose every 15 minutes. Pain seems managed but very anxious/restless  Nausea and vomiting-continue compazine to 10 mg every 6 and zofran prn. Much more effective than Zofran. Compazine remains helpful patient continues to only tolerate ice chips. Constipation-related to large tumor burden in her abdomen. Last bowel movement 2 weeks ago but do not anticipate any further stool given her cancer  Anxiety/delirium-dose of versed given this morning and did help. May need to consider scheduling versed and/or haldol  Plan will be reviewed with [facility] hospice team  Plan reviewed with patient and bedside nursing team.  Talked with sister both on the phone and then at bedside.  and SW to support family needs  Disposition: anticipate ongoing decline, and death in the [address] Hospice Plan of care was reviewed in detail and agree with current plan of care    Discussed with bedside nurse and also with [facility] hospice RN  Prognosis estimated based on 03/15/23 clinical assessment is:   [] Hours to Days    [x] Days to Weeks    [] Other:    Communicated plan of care with: Hospice Case Manager;  Hospice IDT; Care Team     GOALS OF CARE     Patient/Medical POA stated Goal of Care: goal of care is comfort     [x] I have reviewed and/or updated ACP information in the Advance Care Planning Navigator. This information is available in the [address] link in the patient's chart header. Primary Decision [facility] Agent):   Primary Decision Maker: Mimi Garcia - Daughter - [phone]    Secondary Decision Maker: Nolan Wilhelm - Brother - [phone]    Resuscitation Status: DNR  If DNR is there a Durable DNR on file? : [x] Yes [] No (If no, complete Durable DNR)    HISTORY     History obtained from: patient, hospice nurse    CHIEF COMPLAINT: nausea, dysgeusia, vomiting, poor PO intake, constipation  The patient is:   [x] Verbal  [] Nonverbal  [] Unresponsive    HPI/SUBJECTIVE:    78year old female at home supported by [employer]. She has about one week of difficult to control symptoms. Fentanyl patch and liquid morphine for pain has not been effective as she is not able to keep the morphine down. The patch was changed to Q48 hrs when it was noted that the 3rd day was when she had more pain. She has been using enemas and senokot (2 tabs BID) but still has not had a BM in the past 5 days (since Tuesday)  She is vomiting up meds or food but is able to keep down sips of water and tea. Pain is 4/10 right now across her abdomen, it is worse with ambulation  No real change in distention, some slight bloating since summertime    3/6-continues to describe diffuse pain in her abdomen. Even with just palpation of my stethoscope cause discomfort. Continuing not eating or drinking well    3/7-patient continues to have significant pain in her abdomen. Feels like her abdomen is more distended. 3/8-patient anxious but feels like pain is better managed overall. Still with nausea    3/9-patient appears more comfortable this morning. A little tearful at times. Nausea and pain seems somewhat improved    3/10-patient appears much more comfortable this morning. She actually was able to have a little bit of a smile.   Still only tolerating ice chips. 3/11- patient drowsy but wakes from sleep, slowed processing evident, irritable today, major shift in personality per RN    3/13-patient arouses easily. Definitely struggling with processing and coming up with her words. A little bit more irritable today than when I saw her on Friday. 3/14-patient appears little more ashen. A little slow to respond. Definitely getting weaker. Discussed the possibility of a Sigala catheter and she will think about it    3/15-patient very anxious and tearful this morning.         REVIEW OF SYSTEMS     The following systems were: [x] reviewed  [] unable to be reviewed    Positive ROS include:  Constitutional: fatigue, weakness, in pain especially with movement  Ears/nose/mouth/throat:   Respiratory:  Gastrointestinal:poor appetite, nausea, vomiting, abdominal pain, constipation - last BM Tuesday  Everything tastes horrible  Musculoskeletal:pain,   Neurologic:poor sleep, not more than 1-2 hours per night  Adult Non-Verbal Pain Assessment Score: not applicable, pt is verbal but had difficulty rating pain today-states having some discomfort in her lower abdomen this morning    Face  [] 0   No particular expression or smile  [] 1   Occasional grimace, tearing, frowning, wrinkled forehead  [] 2   Frequent grimace, tearing, frowning, wrinkled forehead    Activity (movement)  [] 0   Lying quietly, normal position  [] 1   Seeking attention through movement or slow, cautious movement  [] 2   Restless, excessive activity and/or withdrawal reflexes    Guarding  [] 0   Lying quietly, no positioning of hands over areas of body  [] 1   Splinting areas of the body, tense  [] 2   Rigid, stiff    Physiology (vital signs)  [] 0   Stable vital signs  [] 1   Change in any of the following: SBP > 20mm Hg; HR > 20/minute  [] 2   Change in any of the following: SBP > 30mm Hg; HR > 25/minute    Respiratory  [] 0   Baseline RR/SpO2, compliant with ventilator  [] 1   RR > 10 above baseline, or 5% drop SpO2, mild asynchrony with ventilator  [] 2   RR > 20 above baseline, or 10% drop SpO2, asynchrony with ventilator     FUNCTIONAL ASSESSMENT     Palliative Performance Scale (PPS):40       PSYCHOSOCIAL/SPIRITUAL ASSESSMENT     Active Problems:    Ovarian cancer ([facility] [address] (3/5/2023)    Past Medical History:   Diagnosis Date    Anxiety     Hypotension     Thyroid disease       No past surgical history on file. Social History     Tobacco Use    Smoking status: Not on file    Smokeless tobacco: Not on file   Substance Use Topics    Alcohol use: Not on file     No family history on file.    Allergies   Allergen Reactions    Sulfa (Sulfonamide Antibiotics) Diarrhea and Nausea and Vomiting      Current Facility-Administered Medications   Medication Dose Route Frequency    haloperidol lactate (HALDOL) injection 2 mg  2 mg IntraVENous Q4H PRN    prochlorperazine (COMPAZINE) injection 10 mg  10 mg IntraVENous Q6H    ondansetron (ZOFRAN) injection 4 mg  4 mg IntraVENous Q6H PRN    midazolam (VERSED) injection 2 mg  2 mg IntraVENous Q15MIN PRN    fentaNYL (PF) 1,500 mcg/30 mL PCA   IntraVENous TITRATE    bisacodyL (DULCOLAX) suppository 10 mg  10 mg Rectal DAILY PRN    glycopyrrolate (ROBINUL) injection 0.2 mg  0.2 mg IntraVENous Q4H PRN        PHYSICAL EXAM     Wt Readings from Last 3 Encounters:   12/08/22 66.7 kg (147 lb)   01/04/18 63.5 kg (140 lb)       Visit Vitals  /60 (BP 1 Location: Left lower arm, BP Patient Position: Semi fowlers)   Pulse 91   Temp 99.1 °F (37.3 °C)   Resp 21   SpO2 95%       Supplemental O2  [] Yes  [x] NO  Last bowel movement: 2 weeks ago    Currently this patient has:  [] Peripheral IV [] PICC  [x] PORT [] ICD    [] Sigala Catheter [] NG Tube   [] PEG Tube    [] Rectal Tube [] Drain  [] Other:     Constitutional: age appropriate female, lying in bed, awake, anxious and tearful  Eyes: anicteric, EOMI  ENMT: slightly dry oral mucosa  Cardiovascular: reg s1s2  Respiratory: clear anteriorly  Gastrointestinal: abdomen is distended, slightly more firm, hypoactive bowel sounds, expressed some discomfort in response to palpation around umbilicus  Musculoskeletal:nl  Skin:intact  Neurologic:speech fluent, insight more limited today, moves all 4, no tremor or rigidity  Psychiatric: tearful and anxious      Pertinent Lab and or Imaging Tests:  Lab Results   Component Value Date/Time    Sodium 140 01/03/2018 11:59 AM    Potassium 3.7 01/03/2018 11:59 AM    Chloride 105 01/03/2018 11:59 AM    CO2 28 01/03/2018 11:59 AM    Anion gap 7 01/03/2018 11:59 AM    Glucose 96 01/03/2018 11:59 AM    BUN 15 01/03/2018 11:59 AM    Creatinine 0.80 01/03/2018 11:59 AM    BUN/Creatinine ratio 19 01/03/2018 11:59 AM    GFR est AA >60 01/03/2018 11:59 AM    GFR est non-AA >60 01/03/2018 11:59 AM    Calcium 8.9 01/03/2018 11:59 AM     Lab Results   Component Value Date/Time    Protein, total 7.6 01/03/2018 11:59 AM    Albumin 3.9 01/03/2018 11:59 AM           Total time:   Counseling / coordination time:   > 50% counseling / coordination?:

## 2023-03-15 NOTE — PROGRESS NOTES
Problem: Nausea/Vomiting (Adult)  Goal: *Absence of nausea/vomiting  Outcome: Progressing Towards Goal  Goal: *Palliation of nausea/vomiting (Palliative Care)  Outcome: Progressing Towards Goal     Problem: Patient Education: Go to Patient Education Activity  Goal: Patient/Family Education  Outcome: Progressing Towards Goal     Problem: Breathing Pattern - Ineffective  Goal: *Use of effective breathing techniques  Outcome: Progressing Towards Goal     Problem: Pain  Goal: *Control of acute pain  Outcome: Progressing Towards Goal     Problem: Pressure Injury - Risk of  Goal: *Prevention of pressure injury  Outcome: Progressing Towards Goal  Note: Pressure Injury Interventions:  Sensory Interventions: Assess changes in LOC, Avoid rigorous massage over bony prominences, Check visual cues for pain, Monitor skin under medical devices, Pressure redistribution bed/mattress (bed type), Keep linens dry and wrinkle-free, Minimize linen layers    Moisture Interventions: Absorbent underpads, Maintain skin hydration (lotion/cream), Minimize layers    Activity Interventions: Pressure redistribution bed/mattress(bed type)    Mobility Interventions: Float heels, Pressure redistribution bed/mattress (bed type)    Nutrition Interventions: Offer support with meals,snacks and hydration    Friction and Shear Interventions: Lift sheet, Minimize layers                Problem: Grieving  Goal: *Able to express feelings of grief  Outcome: Progressing Towards Goal  Goal: *Able to identify stages of grieving process  Outcome: Progressing Towards Goal     Problem: Mood - Altered  Goal: *Alleviation of anxiety and depressive symptoms  Outcome: Progressing Towards Goal     Problem: Discharge Planning  Goal: *Participates in discharge planning  Outcome: Progressing Towards Goal     Problem: Patient Education: Go to Patient Education Activity  Goal: Patient/Family Education  Outcome: Progressing Towards Goal     Problem: Pressure Injury - Risk of  Goal: *Prevention of pressure injury  Description: Document Markie Scale and appropriate interventions in the flowsheet. Outcome: Progressing Towards Goal  Note: Pressure Injury Interventions:  Sensory Interventions: Assess changes in LOC, Avoid rigorous massage over bony prominences, Check visual cues for pain, Monitor skin under medical devices, Pressure redistribution bed/mattress (bed type), Keep linens dry and wrinkle-free, Minimize linen layers    Moisture Interventions: Absorbent underpads, Maintain skin hydration (lotion/cream), Minimize layers    Activity Interventions: Pressure redistribution bed/mattress(bed type)    Mobility Interventions: Float heels, Pressure redistribution bed/mattress (bed type)    Nutrition Interventions: Offer support with meals,snacks and hydration    Friction and Shear Interventions: Lift sheet, Minimize layers                Problem: Patient Education: Go to Patient Education Activity  Goal: Patient/Family Education  Outcome: Progressing Towards Goal     Problem: Falls - Risk of  Goal: *Absence of Falls  Description: Document Balta Fall Risk and appropriate interventions in the flowsheet.   Outcome: Progressing Towards Goal  Note: Fall Risk Interventions:  Mobility Interventions: Bed/chair exit alarm, Communicate number of staff needed for ambulation/transfer, Patient to call before getting OOB    Mentation Interventions: Adequate sleep, hydration, pain control, Bed/chair exit alarm, Door open when patient unattended, More frequent rounding, Room close to nurse's station         Elimination Interventions: Bed/chair exit alarm, Call light in reach, Patient to call for help with toileting needs, Stay With Me (per policy), Toileting schedule/hourly rounds              Problem: Patient Education: Go to Patient Education Activity  Goal: Patient/Family Education  Outcome: Progressing Towards Goal     Problem: Infection - Risk of, Central Venous Catheter-Associated Bloodstream Infection  Goal: *Absence of infection signs and symptoms  Outcome: Progressing Towards Goal

## 2023-03-15 NOTE — PROGRESS NOTES
Problem: Nausea/Vomiting (Adult)  Goal: *Absence of nausea/vomiting  Outcome: Progressing Towards Goal  Goal: *Palliation of nausea/vomiting (Palliative Care)  Outcome: Progressing Towards Goal     Problem: Breathing Pattern - Ineffective  Goal: *Use of effective breathing techniques  Outcome: Progressing Towards Goal     Problem: Pain  Goal: *Control of acute pain  Outcome: Progressing Towards Goal     Problem: Pressure Injury - Risk of  Goal: *Prevention of pressure injury  Outcome: Progressing Towards Goal  Note: Pressure Injury Interventions:  Sensory Interventions: Assess changes in LOC, Avoid rigorous massage over bony prominences, Check visual cues for pain, Float heels, Keep linens dry and wrinkle-free, Minimize linen layers, Pad between skin to skin, Pressure redistribution bed/mattress (bed type)    Moisture Interventions: Absorbent underpads, Apply protective barrier, creams and emollients, Check for incontinence Q2 hours and as needed, Maintain skin hydration (lotion/cream), Minimize layers, Moisture barrier    Activity Interventions: Pressure redistribution bed/mattress(bed type)    Mobility Interventions: Float heels, HOB 30 degrees or less, Pressure redistribution bed/mattress (bed type)    Nutrition Interventions: Document food/fluid/supplement intake, Offer support with meals,snacks and hydration    Friction and Shear Interventions: Apply protective barrier, creams and emollients, HOB 30 degrees or less, Minimize layers                Problem: Mood - Altered  Goal: *Alleviation of anxiety and depressive symptoms  Outcome: Progressing Towards Goal     Problem: Pressure Injury - Risk of  Goal: *Prevention of pressure injury  Description: Document Markie Scale and appropriate interventions in the flowsheet.   Outcome: Progressing Towards Goal  Note: Pressure Injury Interventions:  Sensory Interventions: Assess changes in LOC, Avoid rigorous massage over bony prominences, Check visual cues for pain, Float heels, Keep linens dry and wrinkle-free, Minimize linen layers, Pad between skin to skin, Pressure redistribution bed/mattress (bed type)    Moisture Interventions: Absorbent underpads, Apply protective barrier, creams and emollients, Check for incontinence Q2 hours and as needed, Maintain skin hydration (lotion/cream), Minimize layers, Moisture barrier    Activity Interventions: Pressure redistribution bed/mattress(bed type)    Mobility Interventions: Float heels, HOB 30 degrees or less, Pressure redistribution bed/mattress (bed type)    Nutrition Interventions: Document food/fluid/supplement intake, Offer support with meals,snacks and hydration    Friction and Shear Interventions: Apply protective barrier, creams and emollients, HOB 30 degrees or less, Minimize layers                Problem: Falls - Risk of  Goal: *Absence of Falls  Description: Document Balta Fall Risk and appropriate interventions in the flowsheet.   Outcome: Progressing Towards Goal  Note: Fall Risk Interventions:  Mobility Interventions: Bed/chair exit alarm, Communicate number of staff needed for ambulation/transfer, Patient to call before getting OOB    Mentation Interventions: Adequate sleep, hydration, pain control, Bed/chair exit alarm, Door open when patient unattended, More frequent rounding, Room close to nurse's station         Elimination Interventions: Bed/chair exit alarm, Call light in reach, Patient to call for help with toileting needs, Stay With Me (per policy), Toileting schedule/hourly rounds              Problem: Infection - Risk of, Central Venous Catheter-Associated Bloodstream Infection  Goal: *Absence of infection signs and symptoms  Outcome: Progressing Towards Goal

## 2023-03-15 NOTE — HOSPICE
1900: Report received from Sarah Monzon, Johanny Jaquez Ne:  Verified Fentanyl PCA prescription and settings with Sarah Monzon RN . Pt assessed. Flat affect. Denies pain. Limited speech. 2030:  Continues to deny pain. Has a flat affect. Watching TV.  2130: Watching TV. Resp even and unlabored. Neutral facial expession. 2230: In bed. Awake and sitting silently. Denies pain. 2343:  changed fentanyl PCA syringe. Rate  and settings verified with Slava Crockett RN  2346:  Scheduled Compazine given. Helped up to [facility]. Voided a large amount of dark yellow urine. Has a delayed verbal response and a flat affect. 0100:  Resting quietly with eyes closed. Resp even and unlabored. Neutral facial expression. 0202:  Appears to be sleeping. Resp even and unlabored. Neutral facial expression. 0300:  Continues to rest with eyes closed. Neutral facial expression. 0400:  Awake. Staring at T.V. Denies pain or nausea at present. 0500:  Resting with eyes closed. Resp even and unlabored. Neutral facial expression. 1125:  Scheduled Compazine given. C/O dry mouth. Ice chips given. Flat affect. Slow to repsond verbally. 0700:  Report given to oncoming nurse. NAME OF PATIENT:  Ian Recinos    LEVEL OF CARE:  [facility]    REASON FOR GIP:   Pain, despite numerous changes in medications, Nausea and vomiting, despite changes to medications, Medication adjustment that must be monitored 24/7, and Stabilizing treatment that cannot take place at home    *PATIENT REMAINS ELIGIBLE FOR [facility] LEVEL OF CARE AS EVIDENCED BY: Pt unable to take po/sl medications. Requiring IV meds that are not available at home. Requires frequent SN assessment and treatment of symptoms.       REASON FOR RESPITE:  N/A    O2 SAFETY:  N/A    FALL INTERVENTIONS PROVIDED:   Implemented/recommended use of non-skid footwear and Implemented/recommended resources for alarm system (personal alarm, bed alarm, call bell, etc.)     INTERDISPLINARY COMMUNICATION/COLLABORATION:  Physician, IGLESIA, Joya Hansen, and RNJON    NEW MEDICATION INITIATION DOCUMENTATION:  N/A    Reason medication is being initiated:  N/A    MD / Provider name consulted re: change in status / initiation of new medication:  N/A    New Symptom(s):  N/A    New Order(s):  N/A    Name of the person notified of the changes:  N/A    Name of person being taught:  N/A    Instructions given:  N/A    Side Effects taught:  N/A    Response to teaching:  N/A      COMFORTABLE DYING MEASURE:  Is Patient/family satisfied with symptom level?  yes    DISCHARGE PLAN:  home when symptoms managed.

## 2023-03-16 NOTE — PROGRESS NOTES
Problem: Nausea/Vomiting (Adult)  Goal: *Absence of nausea/vomiting  Outcome: Progressing Towards Goal  Goal: *Palliation of nausea/vomiting (Palliative Care)  Outcome: Progressing Towards Goal     Problem: Patient Education: Go to Patient Education Activity  Goal: Patient/Family Education  Outcome: Progressing Towards Goal     Problem: Breathing Pattern - Ineffective  Goal: *Use of effective breathing techniques  Outcome: Progressing Towards Goal     Problem: Pain  Goal: *Control of acute pain  Outcome: Progressing Towards Goal     Problem: Pressure Injury - Risk of  Goal: *Prevention of pressure injury  Outcome: Progressing Towards Goal  Note: Pressure Injury Interventions:  Sensory Interventions: Assess changes in LOC, Avoid rigorous massage over bony prominences, Float heels, Check visual cues for pain, Keep linens dry and wrinkle-free, Minimize linen layers, Monitor skin under medical devices, Pressure redistribution bed/mattress (bed type)    Moisture Interventions: Absorbent underpads, Minimize layers, Limit adult briefs    Activity Interventions: Pressure redistribution bed/mattress(bed type)    Mobility Interventions: HOB 30 degrees or less, Pressure redistribution bed/mattress (bed type)    Nutrition Interventions: Offer support with meals,snacks and hydration    Friction and Shear Interventions: Lift sheet                Problem: Grieving  Goal: *Able to express feelings of grief  Outcome: Progressing Towards Goal  Goal: *Able to identify stages of grieving process  Outcome: Progressing Towards Goal     Problem: Mood - Altered  Goal: *Alleviation of anxiety and depressive symptoms  Outcome: Progressing Towards Goal     Problem: Discharge Planning  Goal: *Participates in discharge planning  Outcome: Progressing Towards Goal     Problem: Patient Education: Go to Patient Education Activity  Goal: Patient/Family Education  Outcome: Progressing Towards Goal     Problem: Pressure Injury - Risk of  Goal: *Prevention of pressure injury  Description: Document Markie Scale and appropriate interventions in the flowsheet. Outcome: Progressing Towards Goal  Note: Pressure Injury Interventions:  Sensory Interventions: Assess changes in LOC, Avoid rigorous massage over bony prominences, Float heels, Check visual cues for pain, Keep linens dry and wrinkle-free, Minimize linen layers, Monitor skin under medical devices, Pressure redistribution bed/mattress (bed type)    Moisture Interventions: Absorbent underpads, Minimize layers, Limit adult briefs    Activity Interventions: Pressure redistribution bed/mattress(bed type)    Mobility Interventions: HOB 30 degrees or less, Pressure redistribution bed/mattress (bed type)    Nutrition Interventions: Offer support with meals,snacks and hydration    Friction and Shear Interventions: Lift sheet                Problem: Patient Education: Go to Patient Education Activity  Goal: Patient/Family Education  Outcome: Progressing Towards Goal     Problem: Falls - Risk of  Goal: *Absence of Falls  Description: Document Balta Fall Risk and appropriate interventions in the flowsheet.   Outcome: Progressing Towards Goal  Note: Fall Risk Interventions:  Mobility Interventions: Bed/chair exit alarm, Communicate number of staff needed for ambulation/transfer, Patient to call before getting OOB    Mentation Interventions: Adequate sleep, hydration, pain control, Bed/chair exit alarm, Door open when patient unattended, More frequent rounding, Room close to nurse's station         Elimination Interventions: Bed/chair exit alarm, Call light in reach, Patient to call for help with toileting needs, Stay With Me (per policy), Toileting schedule/hourly rounds              Problem: Patient Education: Go to Patient Education Activity  Goal: Patient/Family Education  Outcome: Progressing Towards Goal     Problem: Infection - Risk of, Central Venous Catheter-Associated Bloodstream Infection  Goal: *Absence of infection signs and symptoms  Outcome: Progressing Towards Goal     Problem: Potential for Alteration in Skin Integrity  Goal: Monitor skin for areas of alteration in skin integrity  Description: Patient/family/caregiver will demonstrate ability to care for patient's skin, monitor for areas of breakdown, and demonstrate methods to prevent breakdown during hospice care. Outcome: Progressing Towards Goal     Problem: Risk for Falls  Goal: Free of falls during inpatient stay  Description: Patient will be free of falls during inpatient stay. Outcome: Progressing Towards Goal     Problem: Alteration in Mobility  Goal: Remain as independent as possible and remain safe in environment  Description: Patient will remain as independent as possible and remain safe in their environment. Outcome: Progressing Towards Goal     Problem: Pain  Goal: Assess satisfaction of level of comfort and symptom control  Outcome: Progressing Towards Goal  Goal: *Control of acute pain  Outcome: Progressing Towards Goal     Problem: Anxiety/Agitation  Goal: Verbalize or staff assess the ability to manage anxiety  Description: The patient/family/caregiver will verbalize and demonstrate ability to manage the patient's anxiety throughout hospice care. Outcome: Progressing Towards Goal     Problem: Communication Deficit  Goal: Effectively communicate symptoms, needs, and concerns  Description: Patient/family/caregiver will effectively communicate symptoms, needs and concerns. Outcome: Progressing Towards Goal     Problem: Coping and Emotional Distress  Goal: Demonstrate acceptance of terminal illness and understanding of disease progression  Description: Patient/family/caregiver will demonstrate acceptance of terminal disease and understanding of disease progression while employing appropriate coping mechanisms.   Outcome: Progressing Towards Goal     Problem: Pressure Injury - Risk of  Goal: *Prevention of pressure injury  Outcome: Progressing Towards Goal  Note: Pressure Injury Interventions:  Sensory Interventions: Assess changes in LOC, Avoid rigorous massage over bony prominences, Float heels, Check visual cues for pain, Keep linens dry and wrinkle-free, Minimize linen layers, Monitor skin under medical devices, Pressure redistribution bed/mattress (bed type)    Moisture Interventions: Absorbent underpads, Minimize layers, Limit adult briefs    Activity Interventions: Pressure redistribution bed/mattress(bed type)    Mobility Interventions: HOB 30 degrees or less, Pressure redistribution bed/mattress (bed type)    Nutrition Interventions: Offer support with meals,snacks and hydration    Friction and Shear Interventions: Lift sheet                Problem: Nausea/Vomiting (Adult)  Goal: *Absence of nausea/vomiting  Outcome: Progressing Towards Goal     Problem: Infection - Risk of, Central Venous Catheter-Associated Bloodstream Infection  Goal: *Absence of infection signs and symptoms  Outcome: Progressing Towards Goal

## 2023-03-16 NOTE — PROGRESS NOTES
Problem: Nausea/Vomiting (Adult)  Goal: *Absence of nausea/vomiting  Outcome: Progressing Towards Goal  Goal: *Palliation of nausea/vomiting (Palliative Care)  Outcome: Progressing Towards Goal     Problem: Breathing Pattern - Ineffective  Goal: *Use of effective breathing techniques  Outcome: Progressing Towards Goal     Problem: Pressure Injury - Risk of  Goal: *Prevention of pressure injury  Outcome: Progressing Towards Goal  Note: Pressure Injury Interventions:  Sensory Interventions: Assess changes in LOC, Avoid rigorous massage over bony prominences, Check visual cues for pain, Monitor skin under medical devices, Pressure redistribution bed/mattress (bed type), Keep linens dry and wrinkle-free, Minimize linen layers    Moisture Interventions: Absorbent underpads, Maintain skin hydration (lotion/cream), Minimize layers    Activity Interventions: Pressure redistribution bed/mattress(bed type)    Mobility Interventions: Float heels, Pressure redistribution bed/mattress (bed type)    Nutrition Interventions: Offer support with meals,snacks and hydration    Friction and Shear Interventions: Lift sheet, Minimize layers                Problem: Mood - Altered  Goal: *Alleviation of anxiety and depressive symptoms  Outcome: Progressing Towards Goal     Problem: Pressure Injury - Risk of  Goal: *Prevention of pressure injury  Description: Document Markie Scale and appropriate interventions in the flowsheet.   Outcome: Progressing Towards Goal  Note: Pressure Injury Interventions:  Sensory Interventions: Assess changes in LOC, Avoid rigorous massage over bony prominences, Check visual cues for pain, Monitor skin under medical devices, Pressure redistribution bed/mattress (bed type), Keep linens dry and wrinkle-free, Minimize linen layers    Moisture Interventions: Absorbent underpads, Maintain skin hydration (lotion/cream), Minimize layers    Activity Interventions: Pressure redistribution bed/mattress(bed type)    Mobility Interventions: Float heels, Pressure redistribution bed/mattress (bed type)    Nutrition Interventions: Offer support with meals,snacks and hydration    Friction and Shear Interventions: Lift sheet, Minimize layers                Problem: Falls - Risk of  Goal: *Absence of Falls  Description: Document Balta Fall Risk and appropriate interventions in the flowsheet.   Outcome: Progressing Towards Goal  Note: Fall Risk Interventions:  Mobility Interventions: Bed/chair exit alarm, Communicate number of staff needed for ambulation/transfer, Patient to call before getting OOB    Mentation Interventions: Adequate sleep, hydration, pain control, Bed/chair exit alarm, Door open when patient unattended, More frequent rounding, Room close to nurse's station         Elimination Interventions: Bed/chair exit alarm, Call light in reach, Patient to call for help with toileting needs, Stay With Me (per policy), Toileting schedule/hourly rounds              Problem: Infection - Risk of, Central Venous Catheter-Associated Bloodstream Infection  Goal: *Absence of infection signs and symptoms  Outcome: Progressing Towards Goal

## 2023-03-16 NOTE — PROGRESS NOTES
[address] [facility] Help to Those in Need  [phone]    Patient Name: Lexy Maher  YOB: 1955    Date of Provider Hospice Visit: 03/16/23    Level of Care:   [x] General Inpatient (GIP)    [] Routine   [] Respite    Current Location of Care:  [] [facility] [] [facility] [] [id number] [] [facility] [x] Hospice House THE [facility], patient referred from:  [] [facility] [] [facility] [] [id number] [facility] [] [facility] [] Home [x] Other:     Date of Original Hospice Admission: 3/5/2023  Hospice Medical Director at time of admission: Dr. Shannen Yeung Diagnosis: Stage IV ovarian cancer  Diagnoses RELATED to the terminal prognosis: nausea, vomiting, constipation, abdominal pain, carcinomatosis, hx TIA 2018  Other Diagnoses: depression, hypothyroidism      HOSPICE SUMMARY   Do not cut and paste chart information other than imaging findings    Lexy Maher is a 78y.o. year old who was admitted to [facility]. She has extensive history of ovarian cancer treatments beginning in January of 2020. S/p open debulking on 4/28/2020, long term chemo. Last given in July 2020 Patient had a CT scan 4/17/21 with multiple intraperitoneal nodules new from previous scan. The patient's principle diagnosis has resulted in ongoing abdominal pain, now with nausea that is difficult to control. She has been vomiting with intake of food or pills, only keeping down sips of water and tea this week. Refer to LCD     Functionally, the patient's Karnofsky and/or Palliative Performance Scale has declined over a period of months and is estimated at 40. The patient is dependent on the following ADLs:  She requires assistance for bathing    Objective information that support this patients limited prognosis includes: progression of disease by CT scan, unable to tolerate more chemo. Decline in PO intake, dysgeusia, anorexia, ongoing nausea. , uncontrolled abdominal pain    The patient/family chose comfort measures with the support of Hospice. HOSPICE DIAGNOSES   Active Symptoms:  1. Nausea and vomiting.  - not improved with home regimen  2. Constipation, difficulty managing at home  3. Cancer related abdominal pain, and unable to keep down liquid morphine at home  4. Stage IV ovarian cancer with peritoneal carcinomatosis     PLAN   Patient will continue GIP level care as patient needs frequent nursing assessments, IV medication management-not tolerating anything orally at home and pain just not managed. Patient continues show evidence of further decline. Patient initially this morning was feeling okay but then became very agitated and restless again. Required a dose of Versed and then seemed to calm. I have checked on her at least 2 times since then and she has been sleeping. Patient has struggled with sleeping and so I have not attempted to arouse her. I talked with her bedside nurse and as far as a PCA, she is only needed 1 dose in the last 24 hours. Once again, unable to transition patient home as she needs the fentanyl PCA for pain management and this is not able to be obtained by [facility]. This continues to require GIP level care given her agitation, restlessness which may need scheduled medication via the IV route as well. Patient not tolerating anything orally given her disease/ovarian cancer  Pain management-Fentanyl PCA remains at 60 mcg/h basal and 50 mcg bolus dose every 15 minutes. No change in dosing  Nausea and vomiting-continue compazine to 10 mg every 6 and zofran prn. Much more effective than Zofran. Compazine remains helpful patient continues to only tolerate ice chips. Constipation-related to large tumor burden in her abdomen. Last bowel movement 2 weeks ago but do not anticipate any further stool given her cancer  Anxiety/delirium-dose of versed given this morning and did help. May need to consider scheduling versed and/or haldol. We will continue to reassess whether we need to schedule medication. We may actually try dose of Haldol as well to see if this is as effective as the Versed  Plan will be reviewed with [facility] hospice team  Plan reviewed with patient and bedside nursing team.  Talked with her sister outside the room   and SW to support family needs  Disposition: anticipate ongoing decline, and death in the [address] Hospice Plan of care was reviewed in detail and agree with current plan of care    Discussed with bedside nurse and also with [facility] hospice RN  Prognosis estimated based on 03/16/23 clinical assessment is:   [] Hours to Days    [x] Days to Weeks    [] Other:    Communicated plan of care with: Hospice Case Manager; Hospice IDT; Care Team     GOALS OF CARE     Patient/Medical POA stated Goal of Care: goal of care is comfort     [x] I have reviewed and/or updated ACP information in the Advance Care Planning Navigator. This information is available in the [facility] link in the patient's chart header. Primary Decision [facility] Agent):   Primary Decision Maker: Mimi Garcia - Daughter - [phone]    Secondary Decision Maker: Nolan James Brother - [phone]    Resuscitation Status: DNR  If DNR is there a Durable DNR on file? : [x] Yes [] No (If no, complete Durable DNR)    HISTORY     History obtained from: patient, hospice nurse    CHIEF COMPLAINT: nausea, dysgeusia, vomiting, poor PO intake, constipation  The patient is:   [x] Verbal  [] Nonverbal  [] Unresponsive    HPI/SUBJECTIVE:    78year old female at home supported by [employer]. She has about one week of difficult to control symptoms. Fentanyl patch and liquid morphine for pain has not been effective as she is not able to keep the morphine down. The patch was changed to Q48 hrs when it was noted that the 3rd day was when she had more pain.   She has been using enemas and senokot (2 tabs BID) but still has not had a BM in the past 5 days (since Tuesday)  She is vomiting up meds or food but is able to keep down sips of water and tea. Pain is 4/10 right now across her abdomen, it is worse with ambulation  No real change in distention, some slight bloating since summertime    3/6-continues to describe diffuse pain in her abdomen. Even with just palpation of my stethoscope cause discomfort. Continuing not eating or drinking well    3/7-patient continues to have significant pain in her abdomen. Feels like her abdomen is more distended. 3/8-patient anxious but feels like pain is better managed overall. Still with nausea    3/9-patient appears more comfortable this morning. A little tearful at times. Nausea and pain seems somewhat improved    3/10-patient appears much more comfortable this morning. She actually was able to have a little bit of a smile. Still only tolerating ice chips. 3/11- patient drowsy but wakes from sleep, slowed processing evident, irritable today, major shift in personality per RN    3/13-patient arouses easily. Definitely struggling with processing and coming up with her words. A little bit more irritable today than when I saw her on Friday. 3/14-patient appears little more ashen. A little slow to respond. Definitely getting weaker. Discussed the possibility of a Sigala catheter and she will think about it    3/15-patient very anxious and tearful this morning.      3/16-patient sleeping multiple times when I checked on her       REVIEW OF SYSTEMS     The following systems were: [x] reviewed  [] unable to be reviewed    Positive ROS include:  Constitutional: fatigue, weakness, in pain especially with movement  Ears/nose/mouth/throat:   Respiratory:  Gastrointestinal:poor appetite, nausea, vomiting, abdominal pain, constipation - last BM Tuesday  Everything tastes horrible  Musculoskeletal:pain,   Neurologic:poor sleep, not more than 1-2 hours per night  Adult Non-Verbal Pain Assessment Score: not applicable, pt is verbal but had difficulty rating pain today-states having some discomfort in her lower abdomen this morning    Face  [] 0   No particular expression or smile  [] 1   Occasional grimace, tearing, frowning, wrinkled forehead  [] 2   Frequent grimace, tearing, frowning, wrinkled forehead    Activity (movement)  [] 0   Lying quietly, normal position  [] 1   Seeking attention through movement or slow, cautious movement  [] 2   Restless, excessive activity and/or withdrawal reflexes    Guarding  [] 0   Lying quietly, no positioning of hands over areas of body  [] 1   Splinting areas of the body, tense  [] 2   Rigid, stiff    Physiology (vital signs)  [] 0   Stable vital signs  [] 1   Change in any of the following: SBP > 20mm Hg; HR > 20/minute  [] 2   Change in any of the following: SBP > 30mm Hg; HR > 25/minute    Respiratory  [] 0   Baseline RR/SpO2, compliant with ventilator  [] 1   RR > 10 above baseline, or 5% drop SpO2, mild asynchrony with ventilator  [] 2   RR > 20 above baseline, or 10% drop SpO2, asynchrony with ventilator     FUNCTIONAL ASSESSMENT     Palliative Performance Scale (PPS):40       PSYCHOSOCIAL/SPIRITUAL ASSESSMENT     Active Problems:    Ovarian cancer ([facility] [address] (3/5/2023)    Past Medical History:   Diagnosis Date    Anxiety     Hypotension     Thyroid disease       No past surgical history on file. Social History     Tobacco Use    Smoking status: Not on file    Smokeless tobacco: Not on file   Substance Use Topics    Alcohol use: Not on file     No family history on file.    Allergies   Allergen Reactions    Hydromorphone Nausea and Vomiting    Sulfa (Sulfonamide Antibiotics) Diarrhea and Nausea and Vomiting      Current Facility-Administered Medications   Medication Dose Route Frequency    haloperidol lactate (HALDOL) injection 2 mg  2 mg IntraVENous Q4H PRN    prochlorperazine (COMPAZINE) injection 10 mg  10 mg IntraVENous Q6H    ondansetron (ZOFRAN) injection 4 mg  4 mg IntraVENous Q6H PRN    midazolam (VERSED) injection 2 mg 2 mg IntraVENous Q15MIN PRN    fentaNYL (PF) 1,500 mcg/30 mL PCA   IntraVENous TITRATE    bisacodyL (DULCOLAX) suppository 10 mg  10 mg Rectal DAILY PRN    glycopyrrolate (ROBINUL) injection 0.2 mg  0.2 mg IntraVENous Q4H PRN        PHYSICAL EXAM     Wt Readings from Last 3 Encounters:   12/08/22 66.7 kg (147 lb)   01/04/18 63.5 kg (140 lb)       Visit Vitals  /62   Pulse 83   Temp 97.9 °F (36.6 °C)   Resp 20   SpO2 90%       Supplemental O2  [] Yes  [x] NO  Last bowel movement: 2 weeks ago    Currently this patient has:  [] Peripheral IV [] PICC  [x] PORT [] ICD    [] Sigala Catheter [] NG Tube   [] PEG Tube    [] Rectal Tube [] Drain  [] Other:     Constitutional: age appropriate female, lying in bed, sleeping  Eyes: anicteric, EOMI  ENMT: slightly dry oral mucosa  Cardiovascular: reg s1s2  Respiratory: clear anteriorly  Gastrointestinal: abdomen is distended, slightly more firm, hypoactive bowel sounds, expressed some discomfort in response to palpation around umbilicus  Musculoskeletal:nl  Skin:intact  Neurologic:speech fluent, insight more limited today, moves all 4, no tremor or rigidity  Psychiatric: tearful and anxious earlier, sleeping now      Pertinent Lab and or Imaging Tests:  Lab Results   Component Value Date/Time    Sodium 140 01/03/2018 11:59 AM    Potassium 3.7 01/03/2018 11:59 AM    Chloride 105 01/03/2018 11:59 AM    CO2 28 01/03/2018 11:59 AM    Anion gap 7 01/03/2018 11:59 AM    Glucose 96 01/03/2018 11:59 AM    BUN 15 01/03/2018 11:59 AM    Creatinine 0.80 01/03/2018 11:59 AM    BUN/Creatinine ratio 19 01/03/2018 11:59 AM    GFR est AA >60 01/03/2018 11:59 AM    GFR est non-AA >60 01/03/2018 11:59 AM    Calcium 8.9 01/03/2018 11:59 AM     Lab Results   Component Value Date/Time    Protein, total 7.6 01/03/2018 11:59 AM    Albumin 3.9 01/03/2018 11:59 AM           Total time:   Counseling / coordination time:   > 50% counseling / coordination?:

## 2023-03-16 NOTE — HOSPICE
1900:  Report received from Francesca Cardona[address]:  Verified fentanyl PCA prescription and pump settings with ELIS Vaca  2038:  Pt assessed. Denies pain. Lethargic. Slow to respond verbally. 2115:  Visitors in room. Leaving for the evening. Remains lethargic. Denies nausea. 2230:  Had legs out of bed. Wanting to get up to bedside commode. Slow to process thoughts. Very weak when standing. Wants to be independent, but is too weak and starts to fall when standing too long. Becomes irritated with staff trying to assist.  Voided in commode, but was also inc of a small amount of urine. Helped back into bed.    2358:  Scheduled Compazine given IV. Denies any problems. 0032:  Fentanyl PCA syringe changed. Confirmed settings and prescription with Queenie Osei RN. Started coughing. Thought she was going to vomit. Stated, \" I think I just choked a little on the water I was drinking. \"    0140:  Helped up to [facility]. Was able to stand and pivot on own. Voided. Swayed a little when getting back into bed. Was grimacing. Admitted to having some pain, but did not describe. Used a demand dose of fentanyl. 0251:  Resting quietly . Neutral facial expression. 0400:  Awake. Drinking cola. Denies nausea or pain. 0500:  Lying in bed and resting quietly with eyes closed. 2482:  Scheduled compazine given. Denies any problems. 0700:  Report given to oncoming nurse. NAME OF PATIENT:  John Denney    LEVEL OF CARE:  GIP    REASON FOR GIP:   Pain, despite numerous changes in medications, Nausea and vomiting, despite changes to medications, Medication adjustment that must be monitored 24/7, and Stabilizing treatment that cannot take place at home    *PATIENT REMAINS ELIGIBLE FOR GIP LEVEL OF CARE AS EVIDENCED BY: Unable to take po/sl meds to control symptoms. Requires IV meds for symptom management that are not available at home. Requires frequent SN assessment and treatment of symptoms.      REASON FOR RESPITE:  N/A    O2 SAFETY:  N/A    FALL INTERVENTIONS PROVIDED:   Implemented/recommended use of non-skid footwear, Implemented/recommended resources for alarm system (personal alarm, bed alarm, call bell, etc.) , and Implemented/recommended environmental changes (remove hazards, lower bed, improve lighting, etc.)    INTERDISPLINARY COMMUNICATION/COLLABORATION:  Physician, IGLESIA, Porfirio Keen, and RN, CNA    NEW MEDICATION INITIATION DOCUMENTATION:  N/A    Reason medication is being initiated:  N/A    MD / Provider name consulted re: change in status / initiation of new medication:  N/A    New Symptom(s):  N/A    New Order(s):  N/A    Name of the person notified of the changes:  N/A    Name of person being taught:  N/A    Instructions given:  N/A    Side Effects taught:  N/A    Response to teaching:  N/A      COMFORTABLE DYING MEASURE:  Is Patient/family satisfied with symptom level?  yes    DISCHARGE PLAN:  Home when symptoms managed.

## 2023-03-16 NOTE — PROGRESS NOTES
0700: Report received from [facility]. Fentanyl pump settings verified with ELIS Banks. Patient woke easily to noise in room, pleasant affect, no needs at this time. 0745: Patient pleasant affect, calm, cooperative, drowsy. Assessment complete. 0830: Spoke with patient's daughter to provide update. 200: Spoke with patient's sister, Neeraj Dasilva, to provide update. 0920: Patient awake in bed, provided ice chips, patient requesting to take a shower later this morning. 0940: Patient's bed alarm sounding due to patient getting out of bed to bedside commode. Patient agitated, stating that nobody has been in her room this morning, that she has been calling all morning for us, crying with abdominal pain. Patient agitated and aggressive with Mickigila Jaegers while attempting to help patient. Patient assisted to bedside commode, encouraged to use her PCA button. Assisted back into bed and PRN versed 2mg IV administered. Patient resting with eyes closed  0955: Patient resting in bed with eyes open, appears to be much calmer, patient is cooperative, reports pain is now \"just a little bit\"   1030: Patient appears sleeping  1115: Patient resting quietly in bed, drowsy, states she is \"feeling ok\"  1220: Patient sleeping but woke easily to voice. Scheduled prochlorperazine 10mg IV administered. Patient reports pain is \"ok\" at this time and no nausea at this time. 1315: Patient resting in bed with eyes closed, appears sleeping. Neutral facial expression observed. 1410: [facility] Wright [facility] bedside. Update on patient's night and current orders. Patient is resting in bed with eyes closed, appears sleeping. 1500: Patient resting in bed with eyes closed, appears sleeping. Neutral facial position observed  1600: Patient resting in bed with eyes closed, appears sleeping. 1630: Patient's family, Louie Andrews and Neeraj Vidya at the bedside. Patient is resting in bed.   Patient and family are requesting adkins catheter for patient comfort. 1700: Sigala [facility] [place] Patient became agitated while cleaning up around room. PRN midazolam 2mg IV administered for agitation. 1720: Spoke with Dr Gillian Haddad, order received to scheduled versed 2mg IV q 4 hours for agitation. 1750: Patient resting with eyes closed, opened eyes to sounds in the room,scheduled compazine 10mg IV administered. 1825: Patient is resting in bed with eyes closed, appears sleeping. Respirations regular depth and rhythm, neutral facial expression observed.    1900: Report given to [place]

## 2023-03-17 NOTE — HOSPICE
1900 Report received from [facility] [place], [address] [place] [address]. PCA settings verified. 1935 Patient resting in bed, eyes open spontaneously. Speech is delayed, when asked if she's having pain, patient responded no. 2040 Administered scheduled IV versed. PCA syringe changed with José Miguel Moon RN.  2135 Patient resting quietly with eyes closed, respirations unlabored. 2225 Patient resting quietly with eyes closed and neutral facial expression. Respirations unlabored. 2325 Patient resting in bed, opens eyes when RN walks into room. Denies pain at this time. 0030 Patient resting with eyes closed, lightly snoring. Administered scheduled IV versed and compazine. 0110 Patient resting quietly with eyes closed and neutral facial expression. 0205 Patient resting quietly with eyes closed, respirations shallow and unlabored. 0300 Patient resting quietly with eyes closed and neutral facial expression, appears comfortable. 0405 Patient resting quietly with eyes closed, respirations shallow and unlabored. 0500 Patient resting with eyes closed and neutral facial expression. 0540 Patient grabbing on railing, grimacing. PCA dose given. Administered scheduled IV versed and compazine. 4067 Patient has a furrowed brow, when asked if she's still in pain, patient nodded yes. PCA dose given.   0630 Patient in bed with eyes open, asked patient if she's still having pain, shook her head no.  0700 Report given to Gopi doll RN        [facility]    LEVEL OF CARE:  GIP    REASON FOR GIP:   Pain, despite numerous changes in medications, Terminal agitation, despite changes to medications, and Medication adjustment that must be monitored 24/7    *PATIENT REMAINS ELIGIBLE FOR GIP LEVEL OF CARE AS EVIDENCED BY: (MUST BE ADDRESSED OF PATIENT GIP)      REASON FOR RESPITE:  N/a    O2 SAFETY:  N/a    FALL INTERVENTIONS PROVIDED:   Implemented/recommended resources for alarm system (personal alarm, bed alarm, call bell, etc.)  and Implemented/recommended environmental changes (remove hazards, lower bed, improve lighting, etc.)    INTERDISPLINARY COMMUNICATION/COLLABORATION:  Physician, IGLESIA, Chetna Guerra, and RNJON    NEW MEDICATION INITIATION DOCUMENTATION:  N/a    Reason medication is being initiated:  n/a    MD / Provider name consulted re: change in status / initiation of new medication:  n/a    New Symptom(s):  n/a    New Order(s):  n/a    Name of the person notified of the changes:  n/a    Name of person being taught:  n/a    Instructions given:  n/a    Side Effects taught:  n/a    Response to teaching:  n/a      COMFORTABLE DYING MEASURE:  Is Patient/family satisfied with symptom level?  yes    DISCHARGE PLAN:  Return home when symptoms are managed.

## 2023-03-17 NOTE — PROGRESS NOTES
0700  report received. 0730  Pt lying in bed, very lethargic. Pt did not open eyes during assessment. Pt yenifer to whisper NO when asked if she was having pain. No facial grimacing or moaning. Lungs are clear but diminished. Pt is on RA. No cough noted.  + bowel sounds but very distant. Abd is soft. Last reported BM was 2-28. Intake is poor. Sigala is draining straw colored urine. No edema noted. Skin is intact and warm to touch. 0830  Pt resting. Facial expression flat.    0902  Pt medicated with scheduled IV meds. Pt is unable to tolerate PO intake. 3369  Phone call from  for and update. Update given. .  [id number]  Phone call from Yulisalisa Shira for an update. Update given. Pt resting. 3566  Dr Sheldon goldman. Pt resting. No change to POC. [facility] in to visit. No changes made to POC. Updated provided. 1100  Pt resting. No grimacing. 1210  Pt arousable. Shook her head NO when asked if she was having pain. 1250  Pt medicated with scheduled Versed. Pt turned and repositioned. No complaints with changing positions. 1330  Pt relaxed. 1320  Pt continues to sleep. No grimacing. 1330  PCA beeping. Fentanyl syringe changed. Verified with Melani. 1430  Pt resting. 1520  Respirations shallow  Facial expression neutral.    1630  Pt relaxed. 1715  Pt medicated with scheduled IV meds. 1820  Pt resting. No grimacing. 1900  report given.       NAME OF PATIENT:  Chuyita Nunez    LEVEL OF CARE:  GIP    REASON FOR GIP:   Pain, despite numerous changes in medications, Nausea and vomiting, despite changes to medications, Terminal agitation, despite changes to medications, Medication adjustment that must be monitored 24/7, and Stabilizing treatment that cannot take place at home    *PATIENT REMAINS ELIGIBLE FOR [facility] LEVEL OF CARE AS EVIDENCED BY: (MUST BE ADDRESSED OF PATIENT GIP)      REASON FOR RESPITE:  n/a    O2 SAFETY:  RA    FALL INTERVENTIONS PROVIDED:   Implemented/recommended resources for alarm system (personal alarm, bed alarm, call bell, etc.)     INTERDISPLINARY COMMUNICATION/COLLABORATION:  Physician, MSW, [place], and RN, CNA    NEW MEDICATION INITIATION DOCUMENTATION:  NO new meds initiated. Reason medication is being initiated:  n/a    MD / Provider name consulted re: change in status / initiation of new medication:  n/a    New Symptom(s):  n/a    New Order(s):  n/a    Name of the person notified of the changes:  n/a    Name of person being taught:  n/a    Instructions given:  n/a    Side Effects taught:  n/a    Response to teaching:  n/a      COMFORTABLE DYING MEASURE:  Is Patient/family satisfied with symptom level?  yes    DISCHARGE PLAN:  pt will remain at the [facility] for EOL care.   If she should stabilize, Sw and family will look at alternative living arrangements

## 2023-03-17 NOTE — PROGRESS NOTES
Problem: Nausea/Vomiting (Adult)  Goal: *Absence of nausea/vomiting  Outcome: Progressing Towards Goal     Problem: Breathing Pattern - Ineffective  Goal: *Use of effective breathing techniques  Outcome: Progressing Towards Goal     Problem: Pain  Goal: *Control of acute pain  Outcome: Progressing Towards Goal     Problem: Pressure Injury - Risk of  Goal: *Prevention of pressure injury  Outcome: Progressing Towards Goal  Note: Pressure Injury Interventions:  Sensory Interventions: Assess changes in LOC, Check visual cues for pain, Float heels, Minimize linen layers    Moisture Interventions: Absorbent underpads, Internal/External urinary devices, Maintain skin hydration (lotion/cream)    Activity Interventions: Pressure redistribution bed/mattress(bed type)    Mobility Interventions: Float heels, HOB 30 degrees or less, Pressure redistribution bed/mattress (bed type)    Nutrition Interventions: Offer support with meals,snacks and hydration    Friction and Shear Interventions: Lift sheet                Problem: Grieving  Goal: *Able to express feelings of grief  Outcome: Progressing Towards Goal     Problem: Mood - Altered  Goal: *Alleviation of anxiety and depressive symptoms  Outcome: Progressing Towards Goal     Problem: Pressure Injury - Risk of  Goal: *Prevention of pressure injury  Description: Document Markie Scale and appropriate interventions in the flowsheet.   Outcome: Progressing Towards Goal  Note: Pressure Injury Interventions:  Sensory Interventions: Assess changes in LOC, Check visual cues for pain, Float heels, Minimize linen layers    Moisture Interventions: Absorbent underpads, Internal/External urinary devices, Maintain skin hydration (lotion/cream)    Activity Interventions: Pressure redistribution bed/mattress(bed type)    Mobility Interventions: Float heels, HOB 30 degrees or less, Pressure redistribution bed/mattress (bed type)    Nutrition Interventions: Offer support with meals,snacks and hydration    Friction and Shear Interventions: Lift sheet                Problem: Falls - Risk of  Goal: *Absence of Falls  Description: Document Balta Fall Risk and appropriate interventions in the flowsheet.   Outcome: Progressing Towards Goal  Note: Fall Risk Interventions:  Mobility Interventions: Bed/chair exit alarm, Communicate number of staff needed for ambulation/transfer, Patient to call before getting OOB    Mentation Interventions: Adequate sleep, hydration, pain control, Bed/chair exit alarm, Door open when patient unattended, More frequent rounding, Room close to nurse's station         Elimination Interventions: Bed/chair exit alarm, Call light in reach, Patient to call for help with toileting needs, Stay With Me (per policy), Toileting schedule/hourly rounds              Problem: Infection - Risk of, Central Venous Catheter-Associated Bloodstream Infection  Goal: *Absence of infection signs and symptoms  Outcome: Progressing Towards Goal     Problem: Infection - Risk of, Urinary Catheter-Associated Urinary Tract Infection  Goal: *Absence of infection signs and symptoms  Outcome: Progressing Towards Goal

## 2023-03-17 NOTE — PROGRESS NOTES
Problem: Nausea/Vomiting (Adult)  Goal: *Absence of nausea/vomiting  Outcome: Progressing Towards Goal  Goal: *Palliation of nausea/vomiting (Palliative Care)  Outcome: Progressing Towards Goal     Problem: Patient Education: Go to Patient Education Activity  Goal: Patient/Family Education  Outcome: Progressing Towards Goal     Problem: Breathing Pattern - Ineffective  Goal: *Use of effective breathing techniques  Outcome: Progressing Towards Goal     Problem: Pain  Goal: *Control of acute pain  Outcome: Progressing Towards Goal     Problem: Pressure Injury - Risk of  Goal: *Prevention of pressure injury  Outcome: Progressing Towards Goal  Note: Pressure Injury Interventions:  Sensory Interventions: Assess changes in LOC, Check visual cues for pain    Moisture Interventions: Absorbent underpads, Limit adult briefs, Maintain skin hydration (lotion/cream)    Activity Interventions: Pressure redistribution bed/mattress(bed type)    Mobility Interventions: Float heels, HOB 30 degrees or less    Nutrition Interventions:  (unable to tolerate PO meds)    Friction and Shear Interventions: Lift sheet, Apply protective barrier, creams and emollients                Problem: Grieving  Goal: *Able to express feelings of grief  Outcome: Progressing Towards Goal  Goal: *Able to identify stages of grieving process  Outcome: Progressing Towards Goal     Problem: Mood - Altered  Goal: *Alleviation of anxiety and depressive symptoms  Outcome: Progressing Towards Goal     Problem: Discharge Planning  Goal: *Participates in discharge planning  Outcome: Progressing Towards Goal     Problem: Patient Education: Go to Patient Education Activity  Goal: Patient/Family Education  Outcome: Progressing Towards Goal     Problem: Pressure Injury - Risk of  Goal: *Prevention of pressure injury  Description: Document Markie Scale and appropriate interventions in the flowsheet.   Outcome: Progressing Towards Goal  Note: Pressure Injury Interventions:  Sensory Interventions: Assess changes in LOC, Check visual cues for pain    Moisture Interventions: Absorbent underpads, Limit adult briefs, Maintain skin hydration (lotion/cream)    Activity Interventions: Pressure redistribution bed/mattress(bed type)    Mobility Interventions: Float heels, HOB 30 degrees or less    Nutrition Interventions:  (unable to tolerate PO meds)    Friction and Shear Interventions: Lift sheet, Apply protective barrier, creams and emollients                Problem: Patient Education: Go to Patient Education Activity  Goal: Patient/Family Education  Outcome: Progressing Towards Goal     Problem: Falls - Risk of  Goal: *Absence of Falls  Description: Document Balta Fall Risk and appropriate interventions in the flowsheet. Outcome: Progressing Towards Goal  Note: Fall Risk Interventions:  Mobility Interventions: Bed/chair exit alarm    Mentation Interventions: Adequate sleep, hydration, pain control, Bed/chair exit alarm, Reorient patient    Medication Interventions: Bed/chair exit alarm    Elimination Interventions: Bed/chair exit alarm, Call light in reach              Problem: Patient Education: Go to Patient Education Activity  Goal: Patient/Family Education  Outcome: Progressing Towards Goal     Problem: Infection - Risk of, Central Venous Catheter-Associated Bloodstream Infection  Goal: *Absence of infection signs and symptoms  Outcome: Progressing Towards Goal     Problem: Potential for Alteration in Skin Integrity  Goal: Monitor skin for areas of alteration in skin integrity  Description: Patient/family/caregiver will demonstrate ability to care for patient's skin, monitor for areas of breakdown, and demonstrate methods to prevent breakdown during hospice care. Outcome: Progressing Towards Goal     Problem: Risk for Falls  Goal: Free of falls during inpatient stay  Description: Patient will be free of falls during inpatient stay.   Outcome: Progressing Towards Goal Problem: Alteration in Mobility  Goal: Remain as independent as possible and remain safe in environment  Description: Patient will remain as independent as possible and remain safe in their environment. Outcome: Progressing Towards Goal     Problem: Pain  Goal: Assess satisfaction of level of comfort and symptom control  Outcome: Progressing Towards Goal  Goal: *Control of acute pain  Outcome: Progressing Towards Goal     Problem: Anxiety/Agitation  Goal: Verbalize or staff assess the ability to manage anxiety  Description: The patient/family/caregiver will verbalize and demonstrate ability to manage the patient's anxiety throughout hospice care. Outcome: Progressing Towards Goal     Problem: Communication Deficit  Goal: Effectively communicate symptoms, needs, and concerns  Description: Patient/family/caregiver will effectively communicate symptoms, needs and concerns. Outcome: Progressing Towards Goal     Problem: Coping and Emotional Distress  Goal: Demonstrate acceptance of terminal illness and understanding of disease progression  Description: Patient/family/caregiver will demonstrate acceptance of terminal disease and understanding of disease progression while employing appropriate coping mechanisms.   Outcome: Progressing Towards Goal     Problem: Pressure Injury - Risk of  Goal: *Prevention of pressure injury  Outcome: Progressing Towards Goal  Note: Pressure Injury Interventions:  Sensory Interventions: Assess changes in LOC, Check visual cues for pain    Moisture Interventions: Absorbent underpads, Limit adult briefs, Maintain skin hydration (lotion/cream)    Activity Interventions: Pressure redistribution bed/mattress(bed type)    Mobility Interventions: Float heels, HOB 30 degrees or less    Nutrition Interventions:  (unable to tolerate PO meds)    Friction and Shear Interventions: Lift sheet, Apply protective barrier, creams and emollients                Problem: Nausea/Vomiting (Adult)  Goal: *Absence of nausea/vomiting  Outcome: Progressing Towards Goal     Problem: Infection - Risk of, Central Venous Catheter-Associated Bloodstream Infection  Goal: *Absence of infection signs and symptoms  Outcome: Progressing Towards Goal     Problem: Infection - Risk of, Urinary Catheter-Associated Urinary Tract Infection  Goal: *Absence of infection signs and symptoms  Outcome: Progressing Towards Goal

## 2023-03-17 NOTE — PROGRESS NOTES
[address] [facility] Help to Those in Need  [phone]    Patient Name: Fito Sethi  YOB: 1955    Date of Provider Hospice Visit: 03/17/23    Level of Care:   [x] General Inpatient (GIP)    [] Routine   [] Respite    Current Location of Care:  [] [facility] [] [facility] [] [facility] [] [facility] [x] Hospice House [facility], patient referred from:  [] [facility] [] [facility] [] [facility] [] [facility] [] Home [x] Other:     Date of Original Hospice Admission: 3/5/2023  Hospice Medical Director at time of admission: Dr. Obinna Schultz Diagnosis: Stage IV ovarian cancer  Diagnoses RELATED to the terminal prognosis: nausea, vomiting, constipation, abdominal pain, carcinomatosis, hx TIA 2018  Other Diagnoses: depression, hypothyroidism      HOSPICE SUMMARY   Do not cut and paste chart information other than imaging findings    Fito Sethi is a 78y.o. year old who was admitted to [facility]. She has extensive history of ovarian cancer treatments beginning in January of 2020. S/p open debulking on 4/28/2020, long term chemo. Last given in July 2020 Patient had a CT scan 4/17/21 with multiple intraperitoneal nodules new from previous scan. The patient's principle diagnosis has resulted in ongoing abdominal pain, now with nausea that is difficult to control. She has been vomiting with intake of food or pills, only keeping down sips of water and tea this week. Refer to LCD     Functionally, the patient's Karnofsky and/or Palliative Performance Scale has declined over a period of months and is estimated at 40. The patient is dependent on the following ADLs:  She requires assistance for bathing    Objective information that support this patients limited prognosis includes: progression of disease by CT scan, unable to tolerate more chemo. Decline in PO intake, dysgeusia, anorexia, ongoing nausea. , uncontrolled abdominal pain    The patient/family chose comfort measures with the support of Hospice. HOSPICE DIAGNOSES   Active Symptoms:  1. Nausea and vomiting.  - not improved with home regimen  2. Constipation, difficulty managing at home  3. Cancer related abdominal pain, and unable to keep down liquid morphine at home  4. Stage IV ovarian cancer with peritoneal carcinomatosis  5. Agitation/delirium     PLAN   Patient will continue GIP level care as patient needs frequent nursing assessments, IV medication management-not tolerating anything orally at home and pain just not managed. As the day progressed yesterday, patient having evidence of increased agitation/restlessness/delirium. This is not surprising given her overall decline, cancer prognosis, poor p.o. intake. We ultimately scheduled IV Versed and patient has been much more comfortable-no longer restless, resting this morning. Had 3 bolus doses of fentanyl in the last 12 hours. Pain management-Fentanyl PCA remains at 60 mcg/h basal and 50 mcg bolus dose every 15 minutes. No change in dosing at this time  Nausea and vomiting-since patient essentially not eating or drinking, we will stop the scheduled IV Compazine. Anxiety/delirium-Versed has seemed to be effective so we will continue this at 2 mg IV every 4 hours scheduled and every 15 minutes as needed for restlessness. I do worry about the half-life of Versed as it is short acting so we will need to monitor this closely. Could also consider as needed Haldol. Patient much more comfortable and no longer restless this morning  Plan will be reviewed with [facility] hospice team  Plan reviewed with patient and bedside nursing team.  No family at bedside this morning but certainly we will talk with him   and SW to support family needs  Disposition: anticipate ongoing decline, and death in the [facility].     Hospice Plan of care was reviewed in detail and agree with current plan of care    Discussed with bedside nurse and also with [facility] hospice RN  Prognosis estimated based on 03/17/23 clinical assessment is:   [x] Hours to Days    [] Days to Weeks    [] Other:    Communicated plan of care with: Hospice Case Manager; Hospice IDT; Care Team     GOALS OF CARE     Patient/Medical POA stated Goal of Care: goal of care is comfort     [x] I have reviewed and/or updated ACP information in the Advance Care Planning Navigator. This information is available in the [address] link in the patient's chart header. Primary Decision [facility] Agent):   Primary Decision Maker: Governor Godwin - Daughter - [phone]    Secondary Decision Maker: Seema Desai - Brother - [phone]    Resuscitation Status: DNR  If DNR is there a Durable DNR on file? : [x] Yes [] No (If no, complete Durable DNR)    HISTORY     History obtained from: patient, hospice nurse    CHIEF COMPLAINT: nausea, dysgeusia, vomiting, poor PO intake, constipation  The patient is:   [x] Verbal  [] Nonverbal  [] Unresponsive    HPI/SUBJECTIVE:    78year old female at home supported by [employer]. She has about one week of difficult to control symptoms. Fentanyl patch and liquid morphine for pain has not been effective as she is not able to keep the morphine down. The patch was changed to Q48 hrs when it was noted that the 3rd day was when she had more pain. She has been using enemas and senokot (2 tabs BID) but still has not had a BM in the past 5 days (since Tuesday)  She is vomiting up meds or food but is able to keep down sips of water and tea. Pain is 4/10 right now across her abdomen, it is worse with ambulation  No real change in distention, some slight bloating since summertime    3/6-continues to describe diffuse pain in her abdomen. Even with just palpation of my stethoscope cause discomfort. Continuing not eating or drinking well    3/7-patient continues to have significant pain in her abdomen. Feels like her abdomen is more distended.     3/8-patient anxious but feels like pain is better managed overall. Still with nausea    3/9-patient appears more comfortable this morning. A little tearful at times. Nausea and pain seems somewhat improved    3/10-patient appears much more comfortable this morning. She actually was able to have a little bit of a smile. Still only tolerating ice chips. 3/11- patient drowsy but wakes from sleep, slowed processing evident, irritable today, major shift in personality per RN    3/13-patient arouses easily. Definitely struggling with processing and coming up with her words. A little bit more irritable today than when I saw her on Friday. 3/14-patient appears little more ashen. A little slow to respond. Definitely getting weaker. Discussed the possibility of a Sigala catheter and she will think about it    3/15-patient very anxious and tearful this morning. 3/16-patient sleeping multiple times when I checked on her    3/17-patient is resting and appears much more comfortable. I think this is actually the best she has looked in several days and she actually looks at peace.        REVIEW OF SYSTEMS     The following systems were: [x] reviewed  [] unable to be reviewed    Positive ROS include:  Constitutional: fatigue, weakness, in pain especially with movement  Ears/nose/mouth/throat:   Respiratory:  Gastrointestinal:poor appetite, nausea, vomiting, abdominal pain, constipation - last BM Tuesday  Everything tastes horrible  Musculoskeletal:pain,   Neurologic:poor sleep, not more than 1-2 hours per night      Adult Non-Verbal Pain Assessment Score: 2    Face  [] 0   No particular expression or smile  [x] 1   Occasional grimace, tearing, frowning, wrinkled forehead  [] 2   Frequent grimace, tearing, frowning, wrinkled forehead    Activity (movement)  [] 0   Lying quietly, normal position  [x] 1   Seeking attention through movement or slow, cautious movement  [] 2   Restless, excessive activity and/or withdrawal reflexes    Guarding  [x] 0   Lying quietly, no positioning of hands over areas of body  [] 1   Splinting areas of the body, tense  [] 2   Rigid, stiff    Physiology (vital signs)  [] 0   Stable vital signs  [x] 1   Change in any of the following: SBP > 20mm Hg; HR > 20/minute  [] 2   Change in any of the following: SBP > 30mm Hg; HR > 25/minute    Respiratory  [x] 0   Baseline RR/SpO2, compliant with ventilator  [] 1   RR > 10 above baseline, or 5% drop SpO2, mild asynchrony with ventilator  [] 2   RR > 20 above baseline, or 10% drop SpO2, asynchrony with ventilator     FUNCTIONAL ASSESSMENT     Palliative Performance Scale (PPS):20       PSYCHOSOCIAL/SPIRITUAL ASSESSMENT     Active Problems:    Ovarian cancer ([facility] [address] (3/5/2023)    Past Medical History:   Diagnosis Date    Anxiety     Hypotension     Thyroid disease       No past surgical history on file. Social History     Tobacco Use    Smoking status: Not on file    Smokeless tobacco: Not on file   Substance Use Topics    Alcohol use: Not on file     No family history on file.    Allergies   Allergen Reactions    Hydromorphone Nausea and Vomiting    Sulfa (Sulfonamide Antibiotics) Diarrhea and Nausea and Vomiting      Current Facility-Administered Medications   Medication Dose Route Frequency    midazolam (VERSED) injection 2 mg  2 mg IntraVENous Q4H    haloperidol lactate (HALDOL) injection 2 mg  2 mg IntraVENous Q4H PRN    prochlorperazine (COMPAZINE) injection 10 mg  10 mg IntraVENous Q6H    ondansetron (ZOFRAN) injection 4 mg  4 mg IntraVENous Q6H PRN    midazolam (VERSED) injection 2 mg  2 mg IntraVENous Q15MIN PRN    fentaNYL (PF) 1,500 mcg/30 mL PCA   IntraVENous TITRATE    bisacodyL (DULCOLAX) suppository 10 mg  10 mg Rectal DAILY PRN    glycopyrrolate (ROBINUL) injection 0.2 mg  0.2 mg IntraVENous Q4H PRN        PHYSICAL EXAM     Wt Readings from Last 3 Encounters:   12/08/22 66.7 kg (147 lb)   01/04/18 63.5 kg (140 lb)       Visit Vitals  /70   Pulse 84   Temp 98.3 °F (36.8 °C)   Resp 14   SpO2 92%       Supplemental O2  [] Yes  [x] NO  Last bowel movement: 2 weeks ago    Currently this patient has:  [] Peripheral IV [] PICC  [x] PORT [] ICD    [] Sigala Catheter [] NG Tube   [] PEG Tube    [] Rectal Tube [] Drain  [] Other:     Constitutional: age appropriate female, lying in bed, resting, no grimacing, no furrowing of the brow, no moaning  Eyes: anicteric, EOMI  ENMT: slightly dry oral mucosa  Cardiovascular: reg s1s2  Respiratory: clear anteriorly  Gastrointestinal: abdomen is distended, slightly more firm, hypoactive bowel sounds, no grimacing with palpation today  Musculoskeletal:nl  Skin:intact  Neurologic: Sleeping  Psychiatric: Calm, sleeping      Pertinent Lab and or Imaging Tests:  Lab Results   Component Value Date/Time    Sodium 140 01/03/2018 11:59 AM    Potassium 3.7 01/03/2018 11:59 AM    Chloride 105 01/03/2018 11:59 AM    CO2 28 01/03/2018 11:59 AM    Anion gap 7 01/03/2018 11:59 AM    Glucose 96 01/03/2018 11:59 AM    BUN 15 01/03/2018 11:59 AM    Creatinine 0.80 01/03/2018 11:59 AM    BUN/Creatinine ratio 19 01/03/2018 11:59 AM    GFR est AA >60 01/03/2018 11:59 AM    GFR est non-AA >60 01/03/2018 11:59 AM    Calcium 8.9 01/03/2018 11:59 AM     Lab Results   Component Value Date/Time    Protein, total 7.6 01/03/2018 11:59 AM    Albumin 3.9 01/03/2018 11:59 AM           Total time:   Counseling / coordination time:   > 50% counseling / coordination?:

## 2023-03-18 NOTE — PROGRESS NOTES
Problem: Nausea/Vomiting (Adult)  Goal: *Absence of nausea/vomiting  Outcome: Progressing Towards Goal     Problem: Breathing Pattern - Ineffective  Goal: *Use of effective breathing techniques  Outcome: Progressing Towards Goal     Problem: Pain  Goal: *Control of acute pain  Outcome: Progressing Towards Goal     Problem: Pressure Injury - Risk of  Goal: *Prevention of pressure injury  Outcome: Progressing Towards Goal  Note: Pressure Injury Interventions:  Sensory Interventions: Assess changes in LOC, Check visual cues for pain, Float heels, Keep linens dry and wrinkle-free, Minimize linen layers    Moisture Interventions: Absorbent underpads, Internal/External urinary devices    Activity Interventions: Pressure redistribution bed/mattress(bed type)    Mobility Interventions: Float heels, HOB 30 degrees or less, Pressure redistribution bed/mattress (bed type)    Nutrition Interventions:  (NPO)    Friction and Shear Interventions: Apply protective barrier, creams and emollients, HOB 30 degrees or less, Lift sheet, Minimize layers                Problem: Grieving  Goal: *Able to express feelings of grief  Outcome: Progressing Towards Goal     Problem: Mood - Altered  Goal: *Alleviation of anxiety and depressive symptoms  Outcome: Progressing Towards Goal     Problem: Discharge Planning  Goal: *Participates in discharge planning  Outcome: Progressing Towards Goal     Problem: Pressure Injury - Risk of  Goal: *Prevention of pressure injury  Description: Document Markie Scale and appropriate interventions in the flowsheet.   Outcome: Progressing Towards Goal  Note: Pressure Injury Interventions:  Sensory Interventions: Assess changes in LOC, Check visual cues for pain, Float heels, Keep linens dry and wrinkle-free, Minimize linen layers    Moisture Interventions: Absorbent underpads, Internal/External urinary devices    Activity Interventions: Pressure redistribution bed/mattress(bed type)    Mobility Interventions: Float heels, HOB 30 degrees or less, Pressure redistribution bed/mattress (bed type)    Nutrition Interventions:  (NPO)    Friction and Shear Interventions: Apply protective barrier, creams and emollients, HOB 30 degrees or less, Lift sheet, Minimize layers                Problem: Infection - Risk of, Central Venous Catheter-Associated Bloodstream Infection  Goal: *Absence of infection signs and symptoms  Outcome: Progressing Towards Goal     Problem: Infection - Risk of, Urinary Catheter-Associated Urinary Tract Infection  Goal: *Absence of infection signs and symptoms  Outcome: Progressing Towards Goal

## 2023-03-18 NOTE — PROGRESS NOTES
0700  report received. PCA pump verified with [facility] RN.    0730  Pt lying in bed, asleep. No facial grimacing or moaning. Lungs are clear but diminished. No cough noted. Pt is on RA. + bowel sounds. Last reported Bm was 2-28. Abd is soft and nontender. Sigala is draining straw colored urine. No edema. Skin is intact. Pt has a R chest PAC with Fentanyl infusing at 60mcg. min with 50 PRN q 15 mins. 0815  Pt resting.    0950  Pt medicated with scheduled IV Versed. Pt snoring. No grimacing. Pt repositioned in bed. 1030 Pt resting. 1145  Pt turned and repositioned. No grimacing. 1230  Pt relaxed. 1300  Pt with legs hanging over the rail. Pt tearful. Repositioned. Pt states  she is hurting so bad. Rn handed pt her PCA button. Pt pushed it. Pt medicated with Versed. Pt repositioned in bed. Asking for something to drink. Pt able to tolerate a couple spoons of an orange slushie. 1400  pt sleeping. 1510  Pt resting. 1600  Pt repositioned in bed. Covers all twisted. Pt grimaced with movement. Rn encouraged pt to utilize PCA. Pt very confused. Unable to complete a sentence. Pt states Hurts. 1700  Pt medicated with Scheduled Versed. Pt grimaced with movement  PCA utilized. 1810  Pt resting. 1840  respirations shallow. No grimacing. Pt resting. 1900  Report given.         NAME OF PATIENT:  Tanja Woods     LEVEL OF CARE:  GIP     REASON FOR GIP:   Pain, despite numerous changes in medications, Nausea and vomiting, despite changes to medications, Terminal agitation, despite changes to medications, Medication adjustment that must be monitored 24/7, and Stabilizing treatment that cannot take place at home     *PATIENT REMAINS ELIGIBLE FOR GIP LEVEL OF CARE AS EVIDENCED BY: (MUST BE ADDRESSED OF PATIENT GIP)        REASON FOR RESPITE:  n/a     O2 SAFETY:  RA     FALL INTERVENTIONS PROVIDED:   Implemented/recommended resources for alarm system (personal alarm, bed alarm, call bell, etc.)      INTERDISPLINARY COMMUNICATION/COLLABORATION:  Physician, MSW, Mike, and RN, CNA     NEW MEDICATION INITIATION DOCUMENTATION:  NO new meds initiated. Reason medication is being initiated:  n/a     MD / Provider name consulted re: change in status / initiation of new medication:  n/a     New Symptom(s):  n/a     New Order(s):  n/a     Name of the person notified of the changes:  n/a     Name of person being taught:  n/a     Instructions given:  n/a     Side Effects taught:  n/a     Response to teaching:  n/a        COMFORTABLE DYING MEASURE:  Is Patient/family satisfied with symptom level?  yes     DISCHARGE PLAN:  pt will remain at the [facility] for EOL care.   If she should stabilize, Sw and family will look at alternative living arrangements

## 2023-03-18 NOTE — HOSPICE
1900 Report received from [facility]. Jesus Gibson 118 settings verified. 2015 Patient resting quietly on her right side with eyes closed and neutral facial expression. 2100 Patient resting in bed, RN assessment and vitals completed. Administered scheduled IV versed. Patient grimacing, given bolus dose of PCA. 2200 Patient resting with eyes closed, lightly snoring. 2300 Patient resting quietly with eyes closed and neutral facial expression. 2350 Patient resting quietly with eyes closed, respiration even and unlabored. 0015 Repositioned onto left side. Given PCA bolus dose for grimacing. 0050 Administered scheduled IV versed. 0130 Patient resting with eyes closed, facial expression relaxed. 0225 Patient resting with eyes closed, no grimacing. Appears comfortable. 0310 Patient resting with eyes closed, respirations unlabored. 0405 Patient resting with eyes closed and neutral facial expression. 0500 Patient resting quietly with eyes closed. Patient startled when she heard this RN but quickly went back to sleep. Administered scheduled IV versed. 0600 Patient resting quietly with eyes closed, respirations unlabored. 0630 Patient resting with eyes closed and neutral facial expression. 0700 Report given to oncoming shift.     NAME OF PATIENT:  Lori Dugan    LEVEL OF CARE:  GIP    REASON FOR GIP:   Pain, despite numerous changes in medications, Terminal agitation, despite changes to medications, and Medication adjustment that must be monitored 24/7    *PATIENT REMAINS ELIGIBLE FOR GIP LEVEL OF CARE AS EVIDENCED BY: (MUST BE ADDRESSED OF PATIENT GIP)      REASON FOR RESPITE:  N/a    O2 SAFETY:  N/a    FALL INTERVENTIONS PROVIDED:   Implemented/recommended resources for alarm system (personal alarm, bed alarm, call bell, etc.)  and Implemented/recommended environmental changes (remove hazards, lower bed, improve lighting, etc.)    INTERDISPLINARY COMMUNICATION/COLLABORATION:  Physician, MSW, Gertrude aSnabria, and RNJON MEDICATION INITIATION DOCUMENTATION:  N/a    Reason medication is being initiated:  n/a    MD / Provider name consulted re: change in status / initiation of new medication:  n/a    New Symptom(s):  n/a    New Order(s):  n/a    Name of the person notified of the changes:  n/a    Name of person being taught:  n/a    Instructions given:  n/a    Side Effects taught:  n/a    Response to teaching:  n/a      COMFORTABLE DYING MEASURE:  Is Patient/family satisfied with symptom level?  yes    DISCHARGE PLAN:  Patient nearing end of life and likely to pass at [facility].

## 2023-03-18 NOTE — PROGRESS NOTES
[address] [facility] Help to Those in Need  [phone]    Patient Name: Jacqueline Ellison  YOB: 1955    Date of Provider Hospice Visit: 03/18/23    Level of Care:   [x] General Inpatient (GIP)    [] Routine   [] Respite    Current Location of Care:  [] [facility] [] [address] [] [facility] [] [facility] [x] Hospice House [facility], patient referred from:  [] [facility] [] [address] [] [facility] [] [facility] [] Home [x] Other:     Date of Original Hospice Admission: 3/5/2023  Hospice Medical Director at time of admission: Dr. Mara Obrien Diagnosis: Stage IV ovarian cancer  Diagnoses RELATED to the terminal prognosis: nausea, vomiting, constipation, abdominal pain, carcinomatosis, hx TIA 2018  Other Diagnoses: depression, hypothyroidism      HOSPICE SUMMARY   Do not cut and paste chart information other than imaging findings    Jacqueline Ellison is a 78y.o. year old who was admitted to [facility]. She has extensive history of ovarian cancer treatments beginning in January of 2020. S/p open debulking on 4/28/2020, long term chemo. Last given in July 2020 Patient had a CT scan 4/17/21 with multiple intraperitoneal nodules new from previous scan. The patient's principle diagnosis has resulted in ongoing abdominal pain, now with nausea that is difficult to control. She has been vomiting with intake of food or pills, only keeping down sips of water and tea this week. Refer to LCD     Functionally, the patient's Karnofsky and/or Palliative Performance Scale has declined over a period of months and is estimated at 40. The patient is dependent on the following ADLs:  She requires assistance for bathing    Objective information that support this patients limited prognosis includes: progression of disease by CT scan, unable to tolerate more chemo. Decline in PO intake, dysgeusia, anorexia, ongoing nausea. , uncontrolled abdominal pain    The patient/family chose comfort measures with the support of Hospice. HOSPICE DIAGNOSES   Active Symptoms:  1. Nausea and vomiting.  - not improved with home regimen  2. Constipation, difficulty managing at home  3. Cancer related abdominal pain, and unable to keep down liquid morphine at home  4. Stage IV ovarian cancer with peritoneal carcinomatosis  5. Agitation/delirium     PLAN   Patient will continue [facility] level care as patient needs frequent nursing assessments, IV medication management-not tolerating anything orally at home and pain just not managed. As the day progressed yesterday, patient having evidence of increased agitation/restlessness/delirium. This is not surprising given her overall decline, cancer prognosis, poor p.o. intake. We ultimately scheduled IV Versed and patient has been much more comfortable-no longer restless, resting this morning. Pain management-Fentanyl PCA remains at 60 mcg/h basal and 50 mcg bolus dose every 15 minutes. No change in dosing at this time  Nausea and vomiting-on I/V Zofran prn , not used any in last 48 hrs. Anxiety/delirium-Versed has seemed to be effective so we will continue this at 2 mg IV every 4 hours scheduled and every 15 minutes as needed, not used any prn Versed in last 24 hrs . I do worry about the half-life of Versed as it is short acting so we will need to monitor this closely. Could also consider as needed Haldol. Patient much more comfortable and no longer restless this morning  Plan will [facility] hosp[facility]  Plan reviewed with patient and bedside nursing team.  No family at bedside this morning .  and SW to support family needs  Disposition: anticipate ongoing decline, and death in the [place] [address].     Hospice Plan of care was reviewed in detail and agree with current plan of care    Discussed with bedside nurse and also with [facility] hospice RN  Prognosis estimated based on 03/18/23 clinical assessment is:   [x] Hours to Days    [] Days to Weeks    [] Other:    Communicated plan of care with: Hospice Case Manager; Hospice IDT; Care Team     GOALS OF CARE     Patient/Medical POA stated Goal of Care: goal of care is comfort     [x] I have reviewed and/or updated ACP information in the Advance Care Planning Navigator. This information is available in the [address] link in the patient's chart header. Primary Decision [facility] Agent):   Primary Decision Maker: José Mcguire - Daughter - [phone]    Secondary Decision Maker: Tori Bell - Brother - [phone]    Resuscitation Status: DNR  If DNR is there a Durable DNR on file? : [x] Yes [] No (If no, complete Durable DNR)    HISTORY     History obtained from: patient, hospice nurse    CHIEF COMPLAINT: nausea, dysgeusia, vomiting, poor PO intake, constipation  The patient is:   [x] Verbal  [] Nonverbal  [] Unresponsive    HPI/SUBJECTIVE:    78year old female at home supported by [employer]. She has about one week of difficult to control symptoms. Fentanyl patch and liquid morphine for pain has not been effective as she is not able to keep the morphine down. The patch was changed to Q48 hrs when it was noted that the 3rd day was when she had more pain. She has been using enemas and senokot (2 tabs BID) but still has not had a BM in the past 5 days (since Tuesday)  She is vomiting up meds or food but is able to keep down sips of water and tea. Pain is 4/10 right now across her abdomen, it is worse with ambulation  No real change in distention, some slight bloating since summertime    3/6-continues to describe diffuse pain in her abdomen. Even with just palpation of my stethoscope cause discomfort. Continuing not eating or drinking well    3/7-patient continues to have significant pain in her abdomen. Feels like her abdomen is more distended. 3/8-patient anxious but feels like pain is better managed overall.  Still with nausea    3/9-patient appears more comfortable this morning. A little tearful at times. Nausea and pain seems somewhat improved    3/10-patient appears much more comfortable this morning. She actually was able to have a little bit of a smile. Still only tolerating ice chips. 3/11- patient drowsy but wakes from sleep, slowed processing evident, irritable today, major shift in personality per RN    3/13-patient arouses easily. Definitely struggling with processing and coming up with her words. A little bit more irritable today than when I saw her on Friday. 3/14-patient appears little more ashen. A little slow to respond. Definitely getting weaker. Discussed the possibility of a Sigala catheter and she will think about it    3/15-patient very anxious and tearful this morning. 3/16-patient sleeping multiple times when I checked on her    3/17-patient is resting and appears much more comfortable. I think this is actually the best she has looked in several days and she actually looks at peace.        REVIEW OF SYSTEMS     The following systems were: [x] reviewed  [] unable to be reviewed    Positive ROS include:  Constitutional: fatigue, weakness, in pain especially with movement  Ears/nose/mouth/throat:   Respiratory:  Gastrointestinal:poor appetite, nausea, vomiting, abdominal pain, constipation - last BM Tuesday  Everything tastes horrible  Musculoskeletal:pain,   Neurologic:poor sleep, not more than 1-2 hours per night      Adult Non-Verbal Pain Assessment Score: 2    Face  [] 0   No particular expression or smile  [x] 1   Occasional grimace, tearing, frowning, wrinkled forehead  [] 2   Frequent grimace, tearing, frowning, wrinkled forehead    Activity (movement)  [] 0   Lying quietly, normal position  [x] 1   Seeking attention through movement or slow, cautious movement  [] 2   Restless, excessive activity and/or withdrawal reflexes    Guarding  [x] 0   Lying quietly, no positioning of hands over areas of body  [] 1   Splinting areas of the body, tense  [] 2   Rigid, stiff    Physiology (vital signs)  [] 0   Stable vital signs  [x] 1   Change in any of the following: SBP > 20mm Hg; HR > 20/minute  [] 2   Change in any of the following: SBP > 30mm Hg; HR > 25/minute    Respiratory  [x] 0   Baseline RR/SpO2, compliant with ventilator  [] 1   RR > 10 above baseline, or 5% drop SpO2, mild asynchrony with ventilator  [] 2   RR > 20 above baseline, or 10% drop SpO2, asynchrony with ventilator     FUNCTIONAL ASSESSMENT     Palliative Performance Scale (PPS):20       PSYCHOSOCIAL/SPIRITUAL ASSESSMENT     Active Problems:    Ovarian cancer ([facility] [address] (3/5/2023)    Past Medical History:   Diagnosis Date    Anxiety     Hypotension     Thyroid disease       No past surgical history on file. Social History     Tobacco Use    Smoking status: Not on file    Smokeless tobacco: Not on file   Substance Use Topics    Alcohol use: Not on file     No family history on file.    Allergies   Allergen Reactions    Hydromorphone Nausea and Vomiting    Sulfa (Sulfonamide Antibiotics) Diarrhea and Nausea and Vomiting      Current Facility-Administered Medications   Medication Dose Route Frequency    midazolam (VERSED) injection 2 mg  2 mg IntraVENous Q4H    haloperidol lactate (HALDOL) injection 2 mg  2 mg IntraVENous Q4H PRN    ondansetron (ZOFRAN) injection 4 mg  4 mg IntraVENous Q6H PRN    midazolam (VERSED) injection 2 mg  2 mg IntraVENous Q15MIN PRN    fentaNYL (PF) 1,500 mcg/30 mL PCA   IntraVENous TITRATE    bisacodyL (DULCOLAX) suppository 10 mg  10 mg Rectal DAILY PRN    glycopyrrolate (ROBINUL) injection 0.2 mg  0.2 mg IntraVENous Q4H PRN        PHYSICAL EXAM     Wt Readings from Last 3 Encounters:   12/08/22 147 lb (66.7 kg)   01/04/18 140 lb (63.5 kg)       Visit Vitals  BP (!) 104/55   Pulse (!) 107   Temp 98.2 °F (36.8 °C)   Resp 16   SpO2 90%       Supplemental O2  [] Yes  [x] NO  Last bowel movement: 2 weeks ago    Currently this patient has:  [] Peripheral IV [] PICC  [x] PORT [] ICD    [] Sigala Catheter [] NG Tube   [] PEG Tube    [] Rectal Tube [] Drain  [] Other:     Constitutional: age appropriate female, lying in bed, resting, no grimacing, no furrowing of the brow, no moaning  Eyes: anicteric, EOMI  ENMT: slightly dry oral mucosa  Cardiovascular: reg s1s2  Respiratory: clear anteriorly  Gastrointestinal: abdomen is distended, slightly more firm, hypoactive bowel sounds, no grimacing with palpation today  Musculoskeletal:nl  Skin:intact  Neurologic: Sleeping  Psychiatric: Calm, sleeping      Pertinent Lab and or Imaging Tests:  Lab Results   Component Value Date/Time    Sodium 140 01/03/2018 11:59 AM    Potassium 3.7 01/03/2018 11:59 AM    Chloride 105 01/03/2018 11:59 AM    CO2 28 01/03/2018 11:59 AM    Anion gap 7 01/03/2018 11:59 AM    Glucose 96 01/03/2018 11:59 AM    BUN 15 01/03/2018 11:59 AM    Creatinine 0.80 01/03/2018 11:59 AM    BUN/Creatinine ratio 19 01/03/2018 11:59 AM    GFR est AA >60 01/03/2018 11:59 AM    GFR est non-AA >60 01/03/2018 11:59 AM    Calcium 8.9 01/03/2018 11:59 AM     Lab Results   Component Value Date/Time    Protein, total 7.6 01/03/2018 11:59 AM    Albumin 3.9 01/03/2018 11:59 AM           Total time:   Counseling / coordination time:   > 50% counseling / coordination?:

## 2023-03-18 NOTE — PROGRESS NOTES
Problem: Nausea/Vomiting (Adult)  Goal: *Absence of nausea/vomiting  Outcome: Progressing Towards Goal  Goal: *Palliation of nausea/vomiting (Palliative Care)  Outcome: Progressing Towards Goal     Problem: Patient Education: Go to Patient Education Activity  Goal: Patient/Family Education  Outcome: Progressing Towards Goal     Problem: Breathing Pattern - Ineffective  Goal: *Use of effective breathing techniques  Outcome: Progressing Towards Goal     Problem: Pain  Goal: *Control of acute pain  Outcome: Progressing Towards Goal     Problem: Pressure Injury - Risk of  Goal: *Prevention of pressure injury  Outcome: Progressing Towards Goal  Note: Pressure Injury Interventions:  Sensory Interventions: Assess changes in LOC, Check visual cues for pain    Moisture Interventions: Absorbent underpads, Maintain skin hydration (lotion/cream)    Activity Interventions: Pressure redistribution bed/mattress(bed type)    Mobility Interventions: Float heels, HOB 30 degrees or less    Nutrition Interventions:  (Pt is too lethargic for PO intake)    Friction and Shear Interventions: Apply protective barrier, creams and emollients, Feet elevated on foot rest, HOB 30 degrees or less, Lift sheet                Problem: Grieving  Goal: *Able to express feelings of grief  Outcome: Progressing Towards Goal  Goal: *Able to identify stages of grieving process  Outcome: Progressing Towards Goal     Problem: Mood - Altered  Goal: *Alleviation of anxiety and depressive symptoms  Outcome: Progressing Towards Goal     Problem: Discharge Planning  Goal: *Participates in discharge planning  Outcome: Progressing Towards Goal     Problem: Patient Education: Go to Patient Education Activity  Goal: Patient/Family Education  Outcome: Progressing Towards Goal     Problem: Pressure Injury - Risk of  Goal: *Prevention of pressure injury  Description: Document Markie Scale and appropriate interventions in the flowsheet.   Outcome: Progressing Towards Goal  Note: Pressure Injury Interventions:  Sensory Interventions: Assess changes in LOC, Check visual cues for pain    Moisture Interventions: Absorbent underpads, Maintain skin hydration (lotion/cream)    Activity Interventions: Pressure redistribution bed/mattress(bed type)    Mobility Interventions: Float heels, HOB 30 degrees or less    Nutrition Interventions:  (Pt is too lethargic for PO intake)    Friction and Shear Interventions: Apply protective barrier, creams and emollients, Feet elevated on foot rest, HOB 30 degrees or less, Lift sheet                Problem: Patient Education: Go to Patient Education Activity  Goal: Patient/Family Education  Outcome: Progressing Towards Goal     Problem: Falls - Risk of  Goal: *Absence of Falls  Description: Document Balta Fall Risk and appropriate interventions in the flowsheet. Outcome: Progressing Towards Goal  Note: Fall Risk Interventions:  Mobility Interventions: Bed/chair exit alarm    Mentation Interventions: Adequate sleep, hydration, pain control, Bed/chair exit alarm, Door open when patient unattended    Medication Interventions: Bed/chair exit alarm, Patient to call before getting OOB    Elimination Interventions: Bed/chair exit alarm, Call light in reach              Problem: Patient Education: Go to Patient Education Activity  Goal: Patient/Family Education  Outcome: Progressing Towards Goal     Problem: Infection - Risk of, Central Venous Catheter-Associated Bloodstream Infection  Goal: *Absence of infection signs and symptoms  Outcome: Progressing Towards Goal     Problem: Potential for Alteration in Skin Integrity  Goal: Monitor skin for areas of alteration in skin integrity  Description: Patient/family/caregiver will demonstrate ability to care for patient's skin, monitor for areas of breakdown, and demonstrate methods to prevent breakdown during hospice care.   Outcome: Progressing Towards Goal     Problem: Risk for Falls  Goal: Free of falls during inpatient stay  Description: Patient will be free of falls during inpatient stay. Outcome: Progressing Towards Goal     Problem: Alteration in Mobility  Goal: Remain as independent as possible and remain safe in environment  Description: Patient will remain as independent as possible and remain safe in their environment. Outcome: Progressing Towards Goal     Problem: Pain  Goal: Assess satisfaction of level of comfort and symptom control  Outcome: Progressing Towards Goal  Goal: *Control of acute pain  Outcome: Progressing Towards Goal     Problem: Anxiety/Agitation  Goal: Verbalize or staff assess the ability to manage anxiety  Description: The patient/family/caregiver will verbalize and demonstrate ability to manage the patient's anxiety throughout hospice care. Outcome: Progressing Towards Goal     Problem: Communication Deficit  Goal: Effectively communicate symptoms, needs, and concerns  Description: Patient/family/caregiver will effectively communicate symptoms, needs and concerns. Outcome: Progressing Towards Goal     Problem: Coping and Emotional Distress  Goal: Demonstrate acceptance of terminal illness and understanding of disease progression  Description: Patient/family/caregiver will demonstrate acceptance of terminal disease and understanding of disease progression while employing appropriate coping mechanisms.   Outcome: Progressing Towards Goal     Problem: Pressure Injury - Risk of  Goal: *Prevention of pressure injury  Outcome: Progressing Towards Goal     Problem: Nausea/Vomiting (Adult)  Goal: *Absence of nausea/vomiting  Outcome: Progressing Towards Goal     Problem: Infection - Risk of, Central Venous Catheter-Associated Bloodstream Infection  Goal: *Absence of infection signs and symptoms  Outcome: Progressing Towards Goal     Problem: Infection - Risk of, Urinary Catheter-Associated Urinary Tract Infection  Goal: *Absence of infection signs and symptoms  Outcome: Progressing Towards Goal

## 2023-03-19 NOTE — PROGRESS NOTES
Problem: Nausea/Vomiting (Adult)  Goal: *Absence of nausea/vomiting  Outcome: Progressing Towards Goal     Problem: Breathing Pattern - Ineffective  Goal: *Use of effective breathing techniques  Outcome: Progressing Towards Goal     Problem: Pain  Goal: *Control of acute pain  Outcome: Progressing Towards Goal     Problem: Pressure Injury - Risk of  Goal: *Prevention of pressure injury  Outcome: Progressing Towards Goal  Note: Pressure Injury Interventions:  Sensory Interventions: Assess changes in LOC, Check visual cues for pain, Float heels, Keep linens dry and wrinkle-free, Minimize linen layers    Moisture Interventions: Absorbent underpads, Apply protective barrier, creams and emollients, Internal/External urinary devices, Maintain skin hydration (lotion/cream)    Activity Interventions: Pressure redistribution bed/mattress(bed type)    Mobility Interventions: Float heels, HOB 30 degrees or less, Pressure redistribution bed/mattress (bed type)    Nutrition Interventions:  (npo)    Friction and Shear Interventions: Apply protective barrier, creams and emollients, HOB 30 degrees or less, Lift sheet, Minimize layers                Problem: Mood - Altered  Goal: *Alleviation of anxiety and depressive symptoms  Outcome: Progressing Towards Goal     Problem: Pressure Injury - Risk of  Goal: *Prevention of pressure injury  Description: Document Markie Scale and appropriate interventions in the flowsheet.   Outcome: Progressing Towards Goal  Note: Pressure Injury Interventions:  Sensory Interventions: Assess changes in LOC, Check visual cues for pain, Float heels, Keep linens dry and wrinkle-free, Minimize linen layers    Moisture Interventions: Absorbent underpads, Apply protective barrier, creams and emollients, Internal/External urinary devices, Maintain skin hydration (lotion/cream)    Activity Interventions: Pressure redistribution bed/mattress(bed type)    Mobility Interventions: Float heels, HOB 30 degrees or less, Pressure redistribution bed/mattress (bed type)    Nutrition Interventions:  (npo)    Friction and Shear Interventions: Apply protective barrier, creams and emollients, HOB 30 degrees or less, Lift sheet, Minimize layers                Problem: Falls - Risk of  Goal: *Absence of Falls  Description: Document Balta Fall Risk and appropriate interventions in the flowsheet.   Outcome: Progressing Towards Goal  Note: Fall Risk Interventions:  Mobility Interventions: Bed/chair exit alarm    Mentation Interventions: Adequate sleep, hydration, pain control, Bed/chair exit alarm, Door open when patient unattended    Medication Interventions: Bed/chair exit alarm, Patient to call before getting OOB    Elimination Interventions: Bed/chair exit alarm, Call light in reach              Problem: Infection - Risk of, Central Venous Catheter-Associated Bloodstream Infection  Goal: *Absence of infection signs and symptoms  Outcome: Progressing Towards Goal     Problem: Infection - Risk of, Urinary Catheter-Associated Urinary Tract Infection  Goal: *Absence of infection signs and symptoms  Outcome: Progressing Towards Goal

## 2023-03-19 NOTE — PROGRESS NOTES
0700  report received. 0750  Pt lying in bed, semi responsive. No facial grimacing or moaning. Lungs are clear but diminished. No cough noted. Pt is on RA. + bowel sounds. Abd is soft and non tender. Last reported BM was 2-28. Sigala is draining straw colored urine. No edema. Skin is intact. Pt has a R chest PAC. Dressing is clear and intact. 0830  Pt resting. 6566  Pt medicated with scheduled Versed. Pt moving about in bed, Pt tearful. Rn encouraged her to hit the PCA. Pt hit the button. 1000 Pt resting,  No grimacing. Pt's Brother at the bedside. Rn updated on increased restlessness and pain over night. Advised the   1110  Pt repositioned. Pt resting. [address]  Dr Oneyda Hanson in to visit. PCA increased  Pt requiring several PRN doses each shift. 200  PCA adjusted and verified with AP RN. Pt grimaced with movement. 1236  Pt medicated with Versed. Pt turned and repositioned. Pt grimacing less with increase of meds. 1330 Pt relaxed. 1430  Pt snoring. 1530  Pt repositioned. Pt relaxed with calming voice. No grimacing. 1615  Pt resting. 1720  Pt medicated with scheduled meds. Pt repositioned in bed. No grimacing. 1830  Pt resting. Respirations shallow, No grimacing. 1900  report given.       NAME OF PATIENT:  Kiera Mandel     LEVEL OF CARE:  [facility]     REASON FOR GIP:   Pain, despite numerous changes in medications, Nausea and vomiting, despite changes to medications, Terminal agitation, despite changes to medications, Medication adjustment that must be monitored 24/7, and Stabilizing treatment that cannot take place at home     *PATIENT REMAINS ELIGIBLE FOR GIP LEVEL OF CARE AS EVIDENCED BY: (MUST BE ADDRESSED OF PATIENT GIP)        REASON FOR RESPITE:  n/a     O2 SAFETY:  RA     FALL INTERVENTIONS PROVIDED:   Implemented/recommended resources for alarm system (personal alarm, bed alarm, call bell, etc.)      INTERDISPLINARY COMMUNICATION/COLLABORATION:  Physician, MSW, Mike, and RN, CNA     NEW MEDICATION INITIATION DOCUMENTATION:  NO new meds initiated. Reason medication is being initiated:  n/a     MD / Provider name consulted re: change in status / initiation of new medication:  n/a     New Symptom(s):  n/a     New Order(s):  n/a     Name of the person notified of the changes:  n/a     Name of person being taught:  n/a     Instructions given:  n/a     Side Effects taught:  n/a     Response to teaching:  n/a        COMFORTABLE DYING MEASURE:  Is Patient/family satisfied with symptom level?  yes     DISCHARGE PLAN:  pt will remain at the [facility] for EOL care.   If she should stabilize, Sw and family will look at alternative living arrangements

## 2023-03-19 NOTE — PROGRESS NOTES
Problem: Nausea/Vomiting (Adult)  Goal: *Absence of nausea/vomiting  Outcome: Progressing Towards Goal     Problem: Patient Education: Go to Patient Education Activity  Goal: Patient/Family Education  Outcome: Progressing Towards Goal     Problem: Pain  Goal: *Control of acute pain  Outcome: Progressing Towards Goal     Problem: Pressure Injury - Risk of  Goal: *Prevention of pressure injury  Outcome: Progressing Towards Goal  Note: Pressure Injury Interventions:  Sensory Interventions: Assess changes in LOC, Check visual cues for pain    Moisture Interventions: Apply protective barrier, creams and emollients    Activity Interventions: Pressure redistribution bed/mattress(bed type)    Mobility Interventions: Float heels, HOB 30 degrees or less    Nutrition Interventions:  (Pt is NPO)    Friction and Shear Interventions: Apply protective barrier, creams and emollients, HOB 30 degrees or less, Lift sheet                Problem: Pressure Injury - Risk of  Goal: *Prevention of pressure injury  Description: Document Markie Scale and appropriate interventions in the flowsheet. Outcome: Progressing Towards Goal  Note: Pressure Injury Interventions:  Sensory Interventions: Assess changes in LOC, Check visual cues for pain    Moisture Interventions: Apply protective barrier, creams and emollients    Activity Interventions: Pressure redistribution bed/mattress(bed type)    Mobility Interventions: Float heels, HOB 30 degrees or less    Nutrition Interventions:  (Pt is NPO)    Friction and Shear Interventions: Apply protective barrier, creams and emollients, HOB 30 degrees or less, Lift sheet                Problem: Patient Education: Go to Patient Education Activity  Goal: Patient/Family Education  Outcome: Progressing Towards Goal     Problem: Falls - Risk of  Goal: *Absence of Falls  Description: Document Balta Fall Risk and appropriate interventions in the flowsheet.   Outcome: Progressing Towards Goal  Note: Fall Risk Interventions:  Mobility Interventions: Bed/chair exit alarm    Mentation Interventions: Adequate sleep, hydration, pain control, Bed/chair exit alarm, Door open when patient unattended    Medication Interventions: Bed/chair exit alarm    Elimination Interventions: Bed/chair exit alarm, Call light in reach              Problem: Patient Education: Go to Patient Education Activity  Goal: Patient/Family Education  Outcome: Progressing Towards Goal     Problem: Infection - Risk of, Central Venous Catheter-Associated Bloodstream Infection  Goal: *Absence of infection signs and symptoms  Outcome: Progressing Towards Goal     Problem: Risk for Falls  Goal: Free of falls during inpatient stay  Description: Patient will be free of falls during inpatient stay. Outcome: Progressing Towards Goal     Problem: Alteration in Mobility  Goal: Remain as independent as possible and remain safe in environment  Description: Patient will remain as independent as possible and remain safe in their environment. Outcome: Progressing Towards Goal     Problem: Anxiety/Agitation  Goal: Verbalize or staff assess the ability to manage anxiety  Description: The patient/family/caregiver will verbalize and demonstrate ability to manage the patient's anxiety throughout hospice care.   Outcome: Progressing Towards Goal

## 2023-03-19 NOTE — PROGRESS NOTES
[facility] Help to Those in Need  [phone]    Patient Name: Liam Blanchard  YOB: 1955    Date of Provider Hospice Visit: 03/19/23    Level of Care:   [x] General Inpatient (GIP)    [] Routine   [] Respite    Current Location of Care:  [] [facility] [] [facility] [] [facility] [] [facility] [x] Hospice House [facility], patient referred from:  [] [facility] [] [facility] [] [facility] [] [facility] [] Home [x] Other:     Date of Original Hospice Admission: 3/5/2023  Hospice Medical Director at time of admission: Dr. Lindsay Vegas Diagnosis: Stage IV ovarian cancer  Diagnoses RELATED to the terminal prognosis: nausea, vomiting, constipation, abdominal pain, carcinomatosis, hx TIA 2018  Other Diagnoses: depression, hypothyroidism      HOSPICE SUMMARY   Do not cut and paste chart information other than imaging findings    Liam Blanchard is a 78y.o. year old who was admitted to [facility]. She has extensive history of ovarian cancer treatments beginning in January of 2020. S/p open debulking on 4/28/2020, long term chemo. Last given in July 2020 Patient had a CT scan 4/17/21 with multiple intraperitoneal nodules new from previous scan. The patient's principle diagnosis has resulted in ongoing abdominal pain, now with nausea that is difficult to control. She has been vomiting with intake of food or pills, only keeping down sips of water and tea this week. Refer to LCD     Functionally, the patient's Karnofsky and/or Palliative Performance Scale has declined over a period of months and is estimated at 40. The patient is dependent on the following ADLs:  She requires assistance for bathing    Objective information that support this patients limited prognosis includes: progression of disease by CT scan, unable to tolerate more chemo. Decline in PO intake, dysgeusia, anorexia, ongoing nausea. , uncontrolled abdominal pain    The patient/family chose comfort measures with the support of Hospice. HOSPICE DIAGNOSES   Active Symptoms:  1. Nausea and vomiting.  - not improved with home regimen  2. Constipation, difficulty managing at home  3. Cancer related abdominal pain, and unable to keep down liquid morphine at home  4. Stage IV ovarian cancer with peritoneal carcinomatosis  5. Agitation/delirium     PLAN   Patient will continue [facility] level care as patient needs frequent nursing assessments, IV medication management-not tolerating anything orally at home and pain just not managed. She has periods of restlessness and pain today requiring prn medication haldol and fentanyl bolus . Pain management : She has periods of restlessness and pain today requiring prn medication haldol and fentanyl bolus . needs adjustment today ,will increase Fentanyl PCA remains at 75 mcg h basal and 60 mcg bolus dose every 15 minutes. Nausea and vomiting-on I/V Zofran prn , not used any in last 48 hrs. Anxiety/delirium-Versed has seemed to be effective so we will continue this at 2 mg IV every 4 hours scheduled and every 15 minutes as needed, and haldol prn, used two doses of haldol today . I do worry about the half-life of Versed as it is short acting so we will need to monitor this closely. Plan will be reviewed with [facility] hospice team  Plan reviewed with patient and bedside nursing team.  No family at bedside this morning . Psychosocial : met with brother Alayna Mcgee at bed side, emotional support offered, change in medication explained.  and SW to support family needs  Disposition: anticipate ongoing decline, and death in the [place] [id number]. Hospice Plan of care was reviewed in detail and agree with current plan of care    Discussed with bedside nurse and also with [facility] hospice RN  Prognosis estimated based on 03/19/23 clinical assessment is:   [x] Hours to Days    [] Days to Weeks    [] Other:    Communicated plan of care with: Hospice Case Manager;  Hospice IDT; Care Team     GOALS OF CARE Patient/Medical POA stated Goal of Care: goal of care is comfort     [x] I have reviewed and/or updated ACP information in the Advance Care Planning Navigator. This information is available in the [address] link in the patient's chart header. Primary Decision [facility] Agent):   Primary Decision Maker: Lita Hogue - Daughter - [phone]    Secondary Decision Maker: John Price - Brother - [phone]    Resuscitation Status: DNR  If DNR is there a Durable DNR on file? : [x] Yes [] No (If no, complete Durable DNR)    HISTORY     History obtained from: patient, hospice nurse    CHIEF COMPLAINT: nausea, dysgeusia, vomiting, poor PO intake, constipation  The patient is:   [x] Verbal  [] Nonverbal  [] Unresponsive    HPI/SUBJECTIVE:    78year old female at home supported by [employer]. She has about one week of difficult to control symptoms. Fentanyl patch and liquid morphine for pain has not been effective as she is not able to keep the morphine down. The patch was changed to Q48 hrs when it was noted that the 3rd day was when she had more pain. She has been using enemas and senokot (2 tabs BID) but still has not had a BM in the past 5 days (since Tuesday)  She is vomiting up meds or food but is able to keep down sips of water and tea. Pain is 4/10 right now across her abdomen, it is worse with ambulation  No real change in distention, some slight bloating since summertime    3/6-continues to describe diffuse pain in her abdomen. Even with just palpation of my stethoscope cause discomfort. Continuing not eating or drinking well    3/7-patient continues to have significant pain in her abdomen. Feels like her abdomen is more distended. 3/8-patient anxious but feels like pain is better managed overall. Still with nausea    3/9-patient appears more comfortable this morning. A little tearful at times.   Nausea and pain seems somewhat improved    3/10-patient appears much more comfortable this morning. She actually was able to have a little bit of a smile. Still only tolerating ice chips. 3/11- patient drowsy but wakes from sleep, slowed processing evident, irritable today, major shift in personality per RN    3/13-patient arouses easily. Definitely struggling with processing and coming up with her words. A little bit more irritable today than when I saw her on Friday. 3/14-patient appears little more ashen. A little slow to respond. Definitely getting weaker. Discussed the possibility of a Sigala catheter and she will think about it    3/15-patient very anxious and tearful this morning. 3/16-patient sleeping multiple times when I checked on her    3/17-patient is resting and appears much more comfortable. I think this is actually the best she has looked in several days and she actually looks at peace. 3/18/23: patient aroused said \": hi baby ' to his brother at bed side, some restlessness noted . Per bed side Rn patient requires 4 doses of fentanyl bolus in last 4 hrs and two doses of haldol prn today .        REVIEW OF SYSTEMS     The following systems were: [x] reviewed  [] unable to be reviewed    Positive ROS include:  Constitutional: fatigue, weakness, in pain especially with movement  Ears/nose/mouth/throat:   Respiratory:  Gastrointestinal:poor appetite, nausea, vomiting, abdominal pain, constipation - last BM Tuesday  Everything tastes horrible  Musculoskeletal:pain,   Neurologic:poor sleep, not more than 1-2 hours per night      Adult Non-Verbal Pain Assessment Score: 2    Face  [] 0   No particular expression or smile  [x] 1   Occasional grimace, tearing, frowning, wrinkled forehead  [] 2   Frequent grimace, tearing, frowning, wrinkled forehead    Activity (movement)  [] 0   Lying quietly, normal position  [x] 1   Seeking attention through movement or slow, cautious movement  [] 2   Restless, excessive activity and/or withdrawal reflexes    Guarding  [x] 0   Lying quietly, no positioning of hands over areas of body  [] 1   Splinting areas of the body, tense  [] 2   Rigid, stiff    Physiology (vital signs)  [] 0   Stable vital signs  [x] 1   Change in any of the following: SBP > 20mm Hg; HR > 20/minute  [] 2   Change in any of the following: SBP > 30mm Hg; HR > 25/minute    Respiratory  [x] 0   Baseline RR/SpO2, compliant with ventilator  [] 1   RR > 10 above baseline, or 5% drop SpO2, mild asynchrony with ventilator  [] 2   RR > 20 above baseline, or 10% drop SpO2, asynchrony with ventilator     FUNCTIONAL ASSESSMENT     Palliative Performance Scale (PPS):20       PSYCHOSOCIAL/SPIRITUAL ASSESSMENT     Active Problems:    Ovarian cancer ([facility] [address] (3/5/2023)    Past Medical History:   Diagnosis Date    Anxiety     Hypotension     Thyroid disease       No past surgical history on file. Social History     Tobacco Use    Smoking status: Not on file    Smokeless tobacco: Not on file   Substance Use Topics    Alcohol use: Not on file     No family history on file.    Allergies   Allergen Reactions    Hydromorphone Nausea and Vomiting    Sulfa (Sulfonamide Antibiotics) Diarrhea and Nausea and Vomiting      Current Facility-Administered Medications   Medication Dose Route Frequency    midazolam (VERSED) injection 2 mg  2 mg IntraVENous Q4H    haloperidol lactate (HALDOL) injection 2 mg  2 mg IntraVENous Q4H PRN    ondansetron (ZOFRAN) injection 4 mg  4 mg IntraVENous Q6H PRN    midazolam (VERSED) injection 2 mg  2 mg IntraVENous Q15MIN PRN    fentaNYL (PF) 1,500 mcg/30 mL PCA   IntraVENous TITRATE    bisacodyL (DULCOLAX) suppository 10 mg  10 mg Rectal DAILY PRN    glycopyrrolate (ROBINUL) injection 0.2 mg  0.2 mg IntraVENous Q4H PRN        PHYSICAL EXAM     Wt Readings from Last 3 Encounters:   12/08/22 147 lb (66.7 kg)   01/04/18 140 lb (63.5 kg)       Visit Vitals  /62   Pulse 94   Temp 98.5 °F (36.9 °C)   Resp 16   SpO2 92%       Supplemental O2  [] Yes  [x] NO  Last bowel movement: 2 weeks ago    Currently this patient has:  [] Peripheral IV [] PICC  [x] PORT [] ICD    [] Sigala Catheter [] NG Tube   [] PEG Tube    [] Rectal Tube [] Drain  [] Other:     Constitutional: age appropriate female, lying in bed, resting, no grimacing, no furrowing of the brow, no moaning, opened eyes.   Eyes: anicteric, EOMI  ENMT: slightly dry oral mucosa  Cardiovascular: reg  Respiratory: clear anteriorly  Gastrointestinal: abdomen is distended, slightly more firm, hypoactive bowel sounds, no grimacing with palpation today  Musculoskeletal:nl  Skin:intact  Neurologic: Sleeping, arousable   Psychiatric: Calm      Pertinent Lab and or Imaging Tests:  Lab Results   Component Value Date/Time    Sodium 140 01/03/2018 11:59 AM    Potassium 3.7 01/03/2018 11:59 AM    Chloride 105 01/03/2018 11:59 AM    CO2 28 01/03/2018 11:59 AM    Anion gap 7 01/03/2018 11:59 AM    Glucose 96 01/03/2018 11:59 AM    BUN 15 01/03/2018 11:59 AM    Creatinine 0.80 01/03/2018 11:59 AM    BUN/Creatinine ratio 19 01/03/2018 11:59 AM    GFR est AA >60 01/03/2018 11:59 AM    GFR est non-AA >60 01/03/2018 11:59 AM    Calcium 8.9 01/03/2018 11:59 AM     Lab Results   Component Value Date/Time    Protein, total 7.6 01/03/2018 11:59 AM    Albumin 3.9 01/03/2018 11:59 AM           Total time:   Counseling / coordination time:   > 50% counseling / coordination?:

## 2023-03-19 NOTE — HOSPICE
1900 Report received from [place], [address] [place] [address]. PCA settings verified. 1930 Patient resting in bed, trying to turn over. This RN and CNA repositioned patient onto left side. RN assessment completed. 2000 Patient resting quietly with eyes closed, respirations unlabored. 2045 Administered scheduled IV versed. Patient continues to rest quietly with eyes closed. 2130 Patient resting with eyes closed, respirations shallow, neutral facial expression observed. [address] syringe hung with Kuldeep Castellanos. 2300 Patient resting quietly with eyes closed, no grimacing. 2345 Patient states she needs to void, reminded by CNA she has a catheter. Patient restless and in pain. Repositioned onto right side, given bolus dose of PCA. Administered prn IV haldol. 0020 Patient resting with eyes closed and neutral facial expression, no longer restless. Prn medication effective. 5912 Patient resting quietly with eyes closed. Administered scheduled IV versed. 0130 Patient resting with eyes closed, respirations unlabored. 0225 Patient resting quietly with eyes closed and neutral facial expression. 0320 Patient moaning and grimacing, restless. Assisted patient in turning over to left side. Patient given bolus dose of PCA and prn IV haldol. 0400 Patient resting quietly with eyes closed and neutral facial expression. No longer restless. Prn medication effective. 0500 Administered scheduled IV versed. Patient briefly opened her eyes then went back to sleep.  0600 Patient resting quietly with eyes closed, respirations unlabored. No grimacing.  0700 Report given to oncoming shift.     NAME OF PATIENT:  John Denney    LEVEL OF CARE:  GIP    REASON FOR GIP:   Pain, despite numerous changes in medications, Terminal agitation, despite changes to medications, and Medication adjustment that must be monitored 24/7    *PATIENT REMAINS ELIGIBLE FOR GIP LEVEL OF CARE AS EVIDENCED BY: (MUST BE ADDRESSED OF PATIENT GIP)      REASON FOR RESPITE:  N/a    O2 SAFETY:  N/a    FALL INTERVENTIONS PROVIDED:   Implemented/recommended resources for alarm system (personal alarm, bed alarm, call bell, etc.)  and Implemented/recommended environmental changes (remove hazards, lower bed, improve lighting, etc.)    INTERDISPLINARY COMMUNICATION/COLLABORATION:  Physician, IGLESIA, Maverick Lopez, and RN, CNA    NEW MEDICATION INITIATION DOCUMENTATION:  N/a    Reason medication is being initiated:  n/a    MD / Provider name consulted re: change in status / initiation of new medication:  n/a    New Symptom(s):  n/a    New Order(s):  n/a    Name of the person notified of the changes:  n/a    Name of person being taught:  n/a    Instructions given:  n/a    Side Effects taught:  n/a    Response to teaching:  n/a      COMFORTABLE DYING MEASURE:  Is Patient/family satisfied with symptom level?  yes    DISCHARGE PLAN:  Patient nearing end of life and likely to pass at [facility].

## 2023-03-20 NOTE — PROGRESS NOTES
1900 Report received from ELIS Morley.  1945 Fentanyl PCA verified at bedside w/ Kee Ayala RN. Pt resting quietly in bed. 2040 Pt resting in bed, eyes open, grimacing. Encouraged PCA use, but pt unable to use at this time. Pushed button for pt.  2130 Scheduled versed administered, see MAR.  2230 Patient resting in bed, eyes closed, neutral facial expression. 2330 Patient resting in bed, eyes closed, respirations unlabored. 0020 Brief changed by JON Damon. Pt turned to L side. Pt grimacing and moaning when moved, eyes open, no verbal response. PCA dose given. 0100 Pt found to be on her hands and knees in the bed, tearful. Pt repositioned onto L side, PCA dose administered. Provided emotional support. Pt remains withdrawn, nonverbal.  0110 Scheduled versed given, see MAR.  0200 Patient resting in bed, eyes closed, respirations unlabored. 0225 Patient resting in bed, eyes closed, lightly snoring.  0320 Pt resting in bed, neutral facial expression. 0415 Pt restless, trying to get out of bed, tearful, confused. Pt assisted by Kee Ayala RN and JON Mas, repositioned, brief changed. PRN haldol given for restlessness, see MAR.  0430 Scheduled versed administered, see MAR. New fentanyl PCA syringe started, verified at bedside by Kee Ayala RN.  0500 Patient resting in bed, eyes closed, respirations unlabored. 0600 Patient resting in bed, eyes closed, neutral facial expression. 0700 Report given to oncwes GILLIS.         NAME OF PATIENT:  [facility]    LEVEL OF CARE:  GIP    REASON FOR GIP:   Medication adjustment that must be monitored 24/7 and Stabilizing treatment that cannot take place at home    *PATIENT REMAINS ELIGIBLE FOR GIP LEVEL OF CARE AS EVIDENCED BY: (MUST BE ADDRESSED OF PATIENT GIP) Pt on fentanyl PCA, requiring IV medication administration, and frequent RN assessment and interventions      REASON FOR RESPITE:  N/A    O2 SAFETY:  N/A    FALL INTERVENTIONS PROVIDED:   Implemented/recommended resources for alarm system (personal alarm, bed alarm, call bell, etc.)  and Implemented/recommended environmental changes (remove hazards, lower bed, improve lighting, etc.)    INTERDISPLINARY COMMUNICATION/COLLABORATION:  Physician, IGLESIA, Magan Rodriguez, and RN, CNA    NEW MEDICATION INITIATION DOCUMENTATION:  N/A    Reason medication is being initiated:  N/A    MD / Provider name consulted re: change in status / initiation of new medication:  N/A    New Symptom(s):  N/A    New Order(s):  N/A    Name of the person notified of the changes:  N/A    Name of person being taught:  N/A    Instructions given:  N/A    Side Effects taught:  N/A    Response to teaching:  N/A      COMFORTABLE DYING MEASURE:  Is Patient/family satisfied with symptom level?  yes    DISCHARGE PLAN:  end of life vs. home

## 2023-03-20 NOTE — PROGRESS NOTES
Problem: Nausea/Vomiting (Adult)  Goal: *Absence of nausea/vomiting  Outcome: Progressing Towards Goal     Problem: Pain  Goal: *Control of acute pain  Outcome: Not Progressing Towards Goal     Problem: Anxiety/Agitation  Goal: Verbalize or staff assess the ability to manage anxiety  Description: The patient/family/caregiver will verbalize and demonstrate ability to manage the patient's anxiety throughout hospice care.   Outcome: Not Progressing Towards Goal

## 2023-03-20 NOTE — PROGRESS NOTES
[address] [facility] Help to Those in Need  [phone]    Patient Name: Pierre Workman  YOB: 1955    Date of Provider Hospice Visit: 03/20/23    Level of Care:   [x] General Inpatient (GIP)    [] Routine   [] Respite    Current Location of Care:  [] [facility] [] [facility] [] [facility] [] [facility] [x] Hospice House [facility], patient referred from:  [] [facility] [] [facility] [] [facility] [] [facility] [] Home [x] Other:     Date of Original Hospice Admission: 3/5/2023  Hospice Medical Director at time of admission: Dr. Whitney Jean Diagnosis: Stage IV ovarian cancer  Diagnoses RELATED to the terminal prognosis: nausea, vomiting, constipation, abdominal pain, carcinomatosis, hx TIA 2018  Other Diagnoses: depression, hypothyroidism      HOSPICE SUMMARY   Do not cut and paste chart information other than imaging findings    Pierre Workman is a 78y.o. year old who was admitted to [facility]. She has extensive history of ovarian cancer treatments beginning in January of 2020. S/p open debulking on 4/28/2020, long term chemo. Last given in July 2020 Patient had a CT scan 4/17/21 with multiple intraperitoneal nodules new from previous scan. The patient's principle diagnosis has resulted in ongoing abdominal pain, now with nausea that is difficult to control. She has been vomiting with intake of food or pills, only keeping down sips of water and tea this week. Refer to LCD     Functionally, the patient's Karnofsky and/or Palliative Performance Scale has declined over a period of months and is estimated at 40. The patient is dependent on the following ADLs:  She requires assistance for bathing    Objective information that support this patients limited prognosis includes: progression of disease by CT scan, unable to tolerate more chemo. Decline in PO intake, dysgeusia, anorexia, ongoing nausea. , uncontrolled abdominal pain    The patient/family chose comfort measures with the support of Hospice. HOSPICE DIAGNOSES   Active Symptoms:  1. Nausea and vomiting.  - not improved with home regimen  2. Constipation, difficulty managing at home  3. Cancer related abdominal pain, and unable to keep down liquid morphine at home  4. Stage IV ovarian cancer with peritoneal carcinomatosis  5. Agitation/delirium     PLAN   Patient will continue [facility] level care as patient needs frequent nursing assessments, IV medication management-not tolerating anything orally at home and pain just not managed. She has periods of restlessness and pain today requiring prn medication haldol and fentanyl bolus . Chart reviewed from the weekend and patient continues show evidence of restlessness and the Versed certainly not lasting the full 4 hours at this point. Adjustments in her PCA were made over the weekend as well. Pain seems to be better managed but the restlessness continues to be an issue. Pain management : Continue her PCA at 75 mcg/h with 60 mcg every 15 minutes as needed for pain and or shortness of breath. Nausea and vomiting-IV medications for this have been held as she has not been eating and drinking s. Anxiety/delirium-we will adjust her Versed to 4 mg IV every 2 hours scheduled every 15 minutes as needed for restlessness. If this is not lasting long enough, we will consider the addition of scheduled Haldol. .    Plan will be [facility] with [facility] hosp[facility] team  Plan reviewed with bedside nursing team.  No family at the bedside  Psychosocial : No family at bedside this morning during my rounds   and SW to support family needs  Disposition: anticipate ongoing decline, and death in the [facility].     Hospice Plan of care was reviewed in detail and agree with current plan of care    Discussed with bedside nurse and also with [facility] hospice RN  Prognosis estimated based on 03/20/23 clinical assessment is:   [x] Hours to Days    [] Days to Weeks    [] Other:    Communicated plan of care with: Hospice ; Hospice IDT; Care Team     GOALS OF CARE     Patient/Medical POA stated Goal of Care: goal of care is comfort     [x] I have reviewed and/or updated ACP information in the Advance Care Planning Navigator. This information is available in the [address] link in the patient's chart header. Primary Decision [facility] Agent):   Primary Decision Maker: Amol Vasques - Daughter - [phone]    Secondary Decision Maker: Joie Rao - Brother - [phone]    Resuscitation Status: DNR  If DNR is there a Durable DNR on file? : [x] Yes [] No (If no, complete Durable DNR)    HISTORY     History obtained from: patient, hospice nurse    CHIEF COMPLAINT: nausea, dysgeusia, vomiting, poor PO intake, constipation  The patient is:   [x] Verbal  [] Nonverbal  [] Unresponsive    HPI/SUBJECTIVE:    78year old female at home supported by [employer]. She has about one week of difficult to control symptoms. Fentanyl patch and liquid morphine for pain has not been effective as she is not able to keep the morphine down. The patch was changed to Q48 hrs when it was noted that the 3rd day was when she had more pain. She has been using enemas and senokot (2 tabs BID) but still has not had a BM in the past 5 days (since Tuesday)  She is vomiting up meds or food but is able to keep down sips of water and tea. Pain is 4/10 right now across her abdomen, it is worse with ambulation  No real change in distention, some slight bloating since summertime    3/6-continues to describe diffuse pain in her abdomen. Even with just palpation of my stethoscope cause discomfort. Continuing not eating or drinking well    3/7-patient continues to have significant pain in her abdomen. Feels like her abdomen is more distended. 3/8-patient anxious but feels like pain is better managed overall. Still with nausea    3/9-patient appears more comfortable this morning. A little tearful at times.   Nausea and pain seems somewhat improved    3/10-patient appears much more comfortable this morning. She actually was able to have a little bit of a smile. Still only tolerating ice chips. 3/11- patient drowsy but wakes from sleep, slowed processing evident, irritable today, major shift in personality per RN    3/13-patient arouses easily. Definitely struggling with processing and coming up with her words. A little bit more irritable today than when I saw her on Friday. 3/14-patient appears little more ashen. A little slow to respond. Definitely getting weaker. Discussed the possibility of a Sigala catheter and she will think about it    3/15-patient very anxious and tearful this morning. 3/16-patient sleeping multiple times when I checked on her    3/17-patient is resting and appears much more comfortable. I think this is actually the best she has looked in several days and she actually looks at peace. 3/18/23: patient aroused said \": hi baby ' to his brother at bed side, some restlessness noted . Per bed side Rn patient requires 4 doses of fentanyl bolus in last 4 hrs and two doses of haldol prn today . 3/20-patient much more restless. Clearly the Versed is not lasting long enough but it is effective when she takes it.   Adjustments have been made       REVIEW OF SYSTEMS     The following systems were: [x] reviewed  [] unable to be reviewed    Positive ROS include:  Constitutional: fatigue, weakness, in pain especially with movement  Ears/nose/mouth/throat:   Respiratory:  Gastrointestinal:poor appetite, nausea, vomiting, abdominal pain, constipation - last BM Tuesday  Everything tastes horrible  Musculoskeletal:pain,   Neurologic:poor sleep, not more than 1-2 hours per night      Adult Non-Verbal Pain Assessment Score: 3    Face  [] 0   No particular expression or smile  [x] 1   Occasional grimace, tearing, frowning, wrinkled forehead  [] 2   Frequent grimace, tearing, frowning, wrinkled forehead    Activity (movement)  [] 0   Lying quietly, normal position  [] 1   Seeking attention through movement or slow, cautious movement  [x] 2   Restless, excessive activity and/or withdrawal reflexes    Guarding  [x] 0   Lying quietly, no positioning of hands over areas of body  [] 1   Splinting areas of the body, tense  [] 2   Rigid, stiff    Physiology (vital signs)  [] 0   Stable vital signs  [x] 1   Change in any of the following: SBP > 20mm Hg; HR > 20/minute  [] 2   Change in any of the following: SBP > 30mm Hg; HR > 25/minute    Respiratory  [x] 0   Baseline RR/SpO2, compliant with ventilator  [] 1   RR > 10 above baseline, or 5% drop SpO2, mild asynchrony with ventilator  [] 2   RR > 20 above baseline, or 10% drop SpO2, asynchrony with ventilator     FUNCTIONAL ASSESSMENT     Palliative Performance Scale (PPS):20       PSYCHOSOCIAL/SPIRITUAL ASSESSMENT     Active Problems:    Ovarian cancer ([facility] [address] (3/5/2023)    Past Medical History:   Diagnosis Date    Anxiety     Hypotension     Thyroid disease       No past surgical history on file. Social History     Tobacco Use    Smoking status: Not on file    Smokeless tobacco: Not on file   Substance Use Topics    Alcohol use: Not on file     No family history on file.    Allergies   Allergen Reactions    Hydromorphone Nausea and Vomiting    Sulfa (Sulfonamide Antibiotics) Diarrhea and Nausea and Vomiting      Current Facility-Administered Medications   Medication Dose Route Frequency    midazolam (VERSED) injection 4 mg  4 mg IntraVENous Q2H    midazolam (VERSED) injection 4 mg  4 mg IntraVENous Q15MIN PRN    haloperidol lactate (HALDOL) injection 2 mg  2 mg IntraVENous Q4H PRN    ondansetron (ZOFRAN) injection 4 mg  4 mg IntraVENous Q6H PRN    fentaNYL (PF) 1,500 mcg/30 mL PCA   IntraVENous TITRATE    bisacodyL (DULCOLAX) suppository 10 mg  10 mg Rectal DAILY PRN    glycopyrrolate (ROBINUL) injection 0.2 mg  0.2 mg IntraVENous Q4H PRN        PHYSICAL EXAM Wt Readings from Last 3 Encounters:   12/08/22 66.7 kg (147 lb)   01/04/18 63.5 kg (140 lb)       Visit Vitals  BP (!) 143/89   Pulse 94   Temp 97.4 °F (36.3 °C)   Resp 18   SpO2 (!) 89%       Supplemental O2  [] Yes  [x] NO  Last bowel movement: 2 weeks ago    Currently this patient has:  [] Peripheral IV [] PICC  [x] PORT [] ICD    [] Sigala Catheter [] NG Tube   [] PEG Tube    [] Rectal Tube [] Drain  [] Other:     Constitutional: age appropriate female, lying in bed, resting, no grimacing, no furrowing of the brow, no moaning, opened eyes, restless, grabbing onto the railing of the bed  Eyes: anicteric, EOMI  ENMT: slightly dry oral mucosa  Cardiovascular: reg  Respiratory: clear anteriorly  Gastrointestinal: abdomen is distended, slightly more firm, hypoactive bowel sounds, no grimacing with palpation today  Musculoskeletal:nl  Skin:intact  Neurologic: Sleeping, arousable   Psychiatric: Restless and agitated when she arouses    Pertinent Lab and or Imaging Tests:  Lab Results   Component Value Date/Time    Sodium 140 01/03/2018 11:59 AM    Potassium 3.7 01/03/2018 11:59 AM    Chloride 105 01/03/2018 11:59 AM    CO2 28 01/03/2018 11:59 AM    Anion gap 7 01/03/2018 11:59 AM    Glucose 96 01/03/2018 11:59 AM    BUN 15 01/03/2018 11:59 AM    Creatinine 0.80 01/03/2018 11:59 AM    BUN/Creatinine ratio 19 01/03/2018 11:59 AM    GFR est AA >60 01/03/2018 11:59 AM    GFR est non-AA >60 01/03/2018 11:59 AM    Calcium 8.9 01/03/2018 11:59 AM     Lab Results   Component Value Date/Time    Protein, total 7.6 01/03/2018 11:59 AM    Albumin 3.9 01/03/2018 11:59 AM           Total time:   Counseling / coordination time:   > 50% counseling / coordination?:

## 2023-03-20 NOTE — PROGRESS NOTES
Problem: Nausea/Vomiting (Adult)  Goal: *Absence of nausea/vomiting  Outcome: Progressing Towards Goal  Goal: *Palliation of nausea/vomiting (Palliative Care)  Outcome: Progressing Towards Goal     Problem: Patient Education: Go to Patient Education Activity  Goal: Patient/Family Education  Outcome: Progressing Towards Goal     Problem: Breathing Pattern - Ineffective  Goal: *Use of effective breathing techniques  Outcome: Progressing Towards Goal     Problem: Pain  Goal: *Control of acute pain  Outcome: Progressing Towards Goal     Problem: Pressure Injury - Risk of  Goal: *Prevention of pressure injury  Outcome: Progressing Towards Goal  Note: Pressure Injury Interventions:  Sensory Interventions: Assess changes in LOC, Check visual cues for pain, Float heels, Keep linens dry and wrinkle-free, Minimize linen layers, Pressure redistribution bed/mattress (bed type)    Moisture Interventions: Absorbent underpads, Internal/External urinary devices, Minimize layers    Activity Interventions: Pressure redistribution bed/mattress(bed type)    Mobility Interventions: HOB 30 degrees or less, Pressure redistribution bed/mattress (bed type)    Nutrition Interventions: Document food/fluid/supplement intake    Friction and Shear Interventions: HOB 30 degrees or less, Minimize layers            Problem: Grieving  Goal: *Able to express feelings of grief  Outcome: Progressing Towards Goal  Goal: *Able to identify stages of grieving process  Outcome: Progressing Towards Goal     Problem: Mood - Altered  Goal: *Alleviation of anxiety and depressive symptoms  Outcome: Progressing Towards Goal     Problem: Discharge Planning  Goal: *Participates in discharge planning  Outcome: Progressing Towards Goal     Problem: Patient Education: Go to Patient Education Activity  Goal: Patient/Family Education  Outcome: Progressing Towards Goal     Problem: Pressure Injury - Risk of  Goal: *Prevention of pressure injury  Description: Document Markie Scale and appropriate interventions in the flowsheet. Outcome: Progressing Towards Goal  Note: Pressure Injury Interventions:  Sensory Interventions: Assess changes in LOC, Check visual cues for pain, Float heels, Keep linens dry and wrinkle-free, Minimize linen layers, Pressure redistribution bed/mattress (bed type)    Moisture Interventions: Absorbent underpads, Internal/External urinary devices, Minimize layers    Activity Interventions: Pressure redistribution bed/mattress(bed type)    Mobility Interventions: HOB 30 degrees or less, Pressure redistribution bed/mattress (bed type)    Nutrition Interventions: Document food/fluid/supplement intake    Friction and Shear Interventions: HOB 30 degrees or less, Minimize layers       Problem: Patient Education: Go to Patient Education Activity  Goal: Patient/Family Education  Outcome: Progressing Towards Goal     Problem: Falls - Risk of  Goal: *Absence of Falls  Description: Document Balta Fall Risk and appropriate interventions in the flowsheet.   Outcome: Progressing Towards Goal  Note: Fall Risk Interventions:  Mobility Interventions: Bed/chair exit alarm    Mentation Interventions: Adequate sleep, hydration, pain control, Bed/chair exit alarm, Door open when patient unattended    Medication Interventions: Bed/chair exit alarm    Elimination Interventions: Bed/chair exit alarm, Call light in reach              Problem: Patient Education: Go to Patient Education Activity  Goal: Patient/Family Education  Outcome: Progressing Towards Goal     Problem: Infection - Risk of, Central Venous Catheter-Associated Bloodstream Infection  Goal: *Absence of infection signs and symptoms  Outcome: Progressing Towards Goal     Problem: Potential for Alteration in Skin Integrity  Goal: Monitor skin for areas of alteration in skin integrity  Description: Patient/family/caregiver will demonstrate ability to care for patient's skin, monitor for areas of breakdown, and demonstrate methods to prevent breakdown during hospice care. Outcome: Progressing Towards Goal     Problem: Risk for Falls  Goal: Free of falls during inpatient stay  Description: Patient will be free of falls during inpatient stay. Outcome: Progressing Towards Goal     Problem: Alteration in Mobility  Goal: Remain as independent as possible and remain safe in environment  Description: Patient will remain as independent as possible and remain safe in their environment. Outcome: Progressing Towards Goal     Problem: Pain  Goal: Assess satisfaction of level of comfort and symptom control  Outcome: Progressing Towards Goal  Goal: *Control of acute pain  Outcome: Progressing Towards Goal     Problem: Anxiety/Agitation  Goal: Verbalize or staff assess the ability to manage anxiety  Description: The patient/family/caregiver will verbalize and demonstrate ability to manage the patient's anxiety throughout hospice care. Outcome: Progressing Towards Goal     Problem: Communication Deficit  Goal: Effectively communicate symptoms, needs, and concerns  Description: Patient/family/caregiver will effectively communicate symptoms, needs and concerns. Outcome: Progressing Towards Goal     Problem: Coping and Emotional Distress  Goal: Demonstrate acceptance of terminal illness and understanding of disease progression  Description: Patient/family/caregiver will demonstrate acceptance of terminal disease and understanding of disease progression while employing appropriate coping mechanisms.   Outcome: Progressing Towards Goal     Problem: Pressure Injury - Risk of  Goal: *Prevention of pressure injury  Outcome: Progressing Towards Goal  Note: Pressure Injury Interventions:  Sensory Interventions: Assess changes in LOC, Check visual cues for pain, Float heels, Keep linens dry and wrinkle-free, Minimize linen layers, Pressure redistribution bed/mattress (bed type)    Moisture Interventions: Absorbent underpads, Internal/External urinary devices, Minimize layers    Activity Interventions: Pressure redistribution bed/mattress(bed type)    Mobility Interventions: HOB 30 degrees or less, Pressure redistribution bed/mattress (bed type)    Nutrition Interventions: Document food/fluid/supplement intake    Friction and Shear Interventions: HOB 30 degrees or less, Minimize layers          Problem: Nausea/Vomiting (Adult)  Goal: *Absence of nausea/vomiting  Outcome: Progressing Towards Goal     Problem: Infection - Risk of, Central Venous Catheter-Associated Bloodstream Infection  Goal: *Absence of infection signs and symptoms  Outcome: Progressing Towards Goal     Problem: Infection - Risk of, Urinary Catheter-Associated Urinary Tract Infection  Goal: *Absence of infection signs and symptoms  Outcome: Progressing Towards Goal

## 2023-03-20 NOTE — PROGRESS NOTES
..  0700:Report received from [facility] and [facility] Pt is awake drosy and restless pulling on her covers facial grimace and moan noted. PCA verify with Mackenzie Mayo. Pt encourage to use her PCA.  08:48:Adm VERSED 2mg, repositioned for comfort, pt encourage to use PCA.  0900:Pt's brother called updated on her care she is currently sleeping after IV VERSED.  1000:Pt sleeping,no restlessness noted. 11:40:Pt is restless adm PRN VERSED, dr Dashawn Levine round new orders to increased VERSED to 4mg SEE MAR  12:21:Adm schedule new dose of VERSED, pt crying and moving around in bed, comforted with kind words. 1300:Pt sleeping sister arrives at bedside, emotional support given. 1400:Pt sleeping, continuous PCA dose, no discomfort noted. 15:00:Pt is awake moving around in bed, adm schedule VERSED.  1600:Pt sleeping no distress noted no restlessness. 1700:Pt sleeping, repositioned self. 17:58:Adm schedule VERSED, diaper check clean and dry. 18:17:New Bag verified with Toni Jean RN. 1900:Report given to [employer]. NAME OF PATIENT:      LEVEL OF CARE:GIP  REASON FOR GIP:Pain and restlessness     *PATIENT REMAINS ELIGIBLE FOR GIP LEVEL OF CARE AS EVIDENCED BY:Pain and restlessness despite med changes.       REASON FOR RESPITE:n/a       O2 SAFETY:  Concentrator positioning (6\" from furniture/drapes), Tanks stored in sánchez , No petroleum based products on face while oxygen in use and Oxygen sign on the door     FALL INTERVENTIONS PROVIDED:   Implemented/recommended use of fall risk identification flag to all team members, Implemented/recommended assistive devices and encouraged their use, Implemented/recommended resources for alarm system (personal alarm, bed alarm, call bell, etc.)  and Implemented/recommended environmental changes (remove hazards, lower bed, improve lighting, etc.)     INTERDISPLINARY COMMUNICATION/COLLABORATION:  Physician, MSW, Mike and RN, CNA     NEW MEDICATION INITIATION DOCUMENTATION:N/A      Reason medication is being initiated:Pain and restlessness     MD / Provider name consulted re: change in status / initiation of new medication:Dr Hodgson     New Symptom(s):Restlessness     New Order(s): VERSED increased to 4 mg SEE MAR     Name of the person notified of the changes: dtr     Name of person being taught:dtr     Instructions given:yes     Side Effects taught:yes     Response to teaching:good        COMFORTABLE DYING MEASURE:  Is Patient/family satisfied with symptom level?  yes     DISCHARGE PLAN:MD and MSW working on discharge planning

## 2023-03-21 NOTE — PROGRESS NOTES
[facility] Help to Those in Need  [phone]    Patient Name: Katie Pichardo  YOB: 1955    Date of Provider Hospice Visit: 03/21/23    Level of Care:   [x] General Inpatient (GIP)    [] Routine   [] Respite    Current Location of Care:  [] [facility] [] [facility] [] [id number] [address] [facility] [] [facility] [x] Hospice House [facility], patient referred from:  [] [facility] [] [facility] [] [id number] [address] [facility] [] [facility] [] Home [x] Other:     Date of Original Hospice Admission: 3/5/2023  Hospice Medical Director at time of admission: Dr. Aimee Alcantara Diagnosis: Stage IV ovarian cancer  Diagnoses RELATED to the terminal prognosis: nausea, vomiting, constipation, abdominal pain, carcinomatosis, hx TIA 2018  Other Diagnoses: depression, hypothyroidism      HOSPICE SUMMARY   Do not cut and paste chart information other than imaging findings    Katie Pichardo is a 78y.o. year old who was admitted to [facility]. She has extensive history of ovarian cancer treatments beginning in January of 2020. S/p open debulking on 4/28/2020, long term chemo. Last given in July 2020 Patient had a CT scan 4/17/21 with multiple intraperitoneal nodules new from previous scan. The patient's principle diagnosis has resulted in ongoing abdominal pain, now with nausea that is difficult to control. She has been vomiting with intake of food or pills, only keeping down sips of water and tea this week. Refer to LCD     Functionally, the patient's Karnofsky and/or Palliative Performance Scale has declined over a period of months and is estimated at 40. The patient is dependent on the following ADLs:  She requires assistance for bathing    Objective information that support this patients limited prognosis includes: progression of disease by CT scan, unable to tolerate more chemo. Decline in PO intake, dysgeusia, anorexia, ongoing nausea. , uncontrolled abdominal pain    The patient/family chose comfort measures with the support of Hospice. HOSPICE DIAGNOSES   Active Symptoms:  1. Nausea and vomiting.  - not improved with home regimen  2. Constipation, difficulty managing at home  3. Cancer related abdominal pain, and unable to keep down liquid morphine at home  4. Stage IV ovarian cancer with peritoneal carcinomatosis  5. Agitation/delirium     PLAN   Patient will continue GIP level care as patient needs frequent nursing assessments, IV medication management-not tolerating anything orally at home and pain just not managed. Patient continued to show some evidence of restlessness and agitation at times. It was noted this morning that her needle to the port was slightly ajar and with adjustment, medications appear to be infusing better. Definitely very restless this morning. Pain management : Continue her PCA at 75 mcg/h with 60 mcg every 15 minutes as needed for pain and or shortness of breath. She had 6 attempts with 5 delivered overnight  Nausea and vomiting-IV medications for this have been held as she has not been eating and drinking  Anxiety/delirium-continue Versed 4 mg IV every 2 hours and add Haldol 2 mg IV every 4 hours and see if this helps with the restlessness/delirium which I think is the biggest component of symptom burden right now. Could consider phenobarbital as well. Addendum-called back to the patient's room by the nursing staff and patient very restless trying to get out of bed, sitting on all fours in the bed this was despite a dose of Versed, Haldol, 65 mg of phenobarbital.  I think we need to make further adjustments. We will stop the Haldol, start phenobarbital and 130 mg every 8-once again patient had 65 mg earlier and really remains very restless. Patient's port clearly working with a new access so this appears to be worsening terminal delirium.   Certainly could be related to long-term opioids but we are in a difficult situation as I do not think we could rotate to morphine given the amount of fentanyl she is requiring and she had significant nausea and vomiting with IV Dilaudid. We will need to be more aggressive in treating the delirium. Plan will be reviewed with [facility] team  Plan reviewed with bedside nursing team.  Updated patient's sister at the bedside and discussed plan of care, prognosis, medications   and SW to support family needs  Disposition: anticipate ongoing decline, and death in the [address] Hospice Plan of care was reviewed in detail and agree with current plan of care    Discussed with bedside nurse and also with [facility] RN  Prognosis estimated based on 03/21/23 clinical assessment is:   [x] Hours to Days    [] Days to Weeks    [] Other:    Communicated plan of care with: Hospice Case Manager; Hospice IDT; Care Team     GOALS OF CARE     Patient/Medical POA stated Goal of Care: goal of care is comfort     [x] I have reviewed and/or updated ACP information in the Advance Care Planning Navigator. This information is available in the [address] link in the patient's chart header. Primary Decision [facility] Agent):   Primary Decision Maker: Shaan Faustin - Daughter - [phone]    Secondary Decision Maker: Suha Riley - Brother - [phone]    Resuscitation Status: DNR  If DNR is there a Durable DNR on file? : [x] Yes [] No (If no, complete Durable DNR)    HISTORY     History obtained from: patient, hospice nurse    CHIEF COMPLAINT: nausea, dysgeusia, vomiting, poor PO intake, constipation  The patient is:   [x] Verbal  [] Nonverbal  [] Unresponsive    HPI/SUBJECTIVE:    78year old female at home supported by [employer]. She has about one week of difficult to control symptoms. Fentanyl patch and liquid morphine for pain has not been effective as she is not able to keep the morphine down. The patch was changed to Q48 hrs when it was noted that the 3rd day was when she had more pain.   She has been using enemas and senokot (2 tabs BID) but still has not had a BM in the past 5 days (since Tuesday)  She is vomiting up meds or food but is able to keep down sips of water and tea. Pain is 4/10 right now across her abdomen, it is worse with ambulation  No real change in distention, some slight bloating since summertime    3/6-continues to describe diffuse pain in her abdomen. Even with just palpation of my stethoscope cause discomfort. Continuing not eating or drinking well    3/7-patient continues to have significant pain in her abdomen. Feels like her abdomen is more distended. 3/8-patient anxious but feels like pain is better managed overall. Still with nausea    3/9-patient appears more comfortable this morning. A little tearful at times. Nausea and pain seems somewhat improved    3/10-patient appears much more comfortable this morning. She actually was able to have a little bit of a smile. Still only tolerating ice chips. 3/11- patient drowsy but wakes from sleep, slowed processing evident, irritable today, major shift in personality per RN    3/13-patient arouses easily. Definitely struggling with processing and coming up with her words. A little bit more irritable today than when I saw her on Friday. 3/14-patient appears little more ashen. A little slow to respond. Definitely getting weaker. Discussed the possibility of a Sigala catheter and she will think about it    3/15-patient very anxious and tearful this morning. 3/16-patient sleeping multiple times when I checked on her    3/17-patient is resting and appears much more comfortable. I think this is actually the best she has looked in several days and she actually looks at peace. 3/18/23: patient aroused said \": hi baby ' to his brother at bed side, some restlessness noted . Per bed side Rn patient requires 4 doses of fentanyl bolus in last 4 hrs and two doses of haldol prn today . 3/20-patient much more restless.   Clearly the Versed is not lasting long enough but it is effective when she takes it.   Adjustments have been made    3/21-patient significantly more restless earlier today but with adjustment in her medication, addition of Haldol, and adjustment in her needle to the port, patient does appear more comfortable       REVIEW OF SYSTEMS     The following systems were: [x] reviewed  [] unable to be reviewed    Positive ROS include:  Constitutional: fatigue, weakness, in pain especially with movement  Ears/nose/mouth/throat:   Respiratory:  Gastrointestinal:poor appetite, nausea, vomiting, abdominal pain, constipation - last BM Tuesday  Everything tastes horrible  Musculoskeletal:pain,   Neurologic:poor sleep, not more than 1-2 hours per night      Adult Non-Verbal Pain Assessment Score: 3    Face  [] 0   No particular expression or smile  [x] 1   Occasional grimace, tearing, frowning, wrinkled forehead  [] 2   Frequent grimace, tearing, frowning, wrinkled forehead    Activity (movement)  [] 0   Lying quietly, normal position  [] 1   Seeking attention through movement or slow, cautious movement  [x] 2   Restless, excessive activity and/or withdrawal reflexes    Guarding  [x] 0   Lying quietly, no positioning of hands over areas of body  [] 1   Splinting areas of the body, tense  [] 2   Rigid, stiff    Physiology (vital signs)  [] 0   Stable vital signs  [x] 1   Change in any of the following: SBP > 20mm Hg; HR > 20/minute  [] 2   Change in any of the following: SBP > 30mm Hg; HR > 25/minute    Respiratory  [x] 0   Baseline RR/SpO2, compliant with ventilator  [] 1   RR > 10 above baseline, or 5% drop SpO2, mild asynchrony with ventilator  [] 2   RR > 20 above baseline, or 10% drop SpO2, asynchrony with ventilator     FUNCTIONAL ASSESSMENT     Palliative Performance Scale (PPS):20       PSYCHOSOCIAL/SPIRITUAL ASSESSMENT     Active Problems:    Ovarian cancer ([facility] [address] (3/5/2023)    Past Medical History:   Diagnosis Date    Anxiety     Hypotension     Thyroid disease       No past surgical history on file. Social History     Tobacco Use    Smoking status: Not on file    Smokeless tobacco: Not on file   Substance Use Topics    Alcohol use: Not on file     No family history on file.    Allergies   Allergen Reactions    Hydromorphone Nausea and Vomiting    Sulfa (Sulfonamide Antibiotics) Diarrhea and Nausea and Vomiting      Current Facility-Administered Medications   Medication Dose Route Frequency    haloperidol lactate (HALDOL) injection 2 mg  2 mg IntraVENous Q4H    midazolam (VERSED) injection 4 mg  4 mg IntraVENous Q2H    midazolam (VERSED) injection 4 mg  4 mg IntraVENous Q15MIN PRN    ondansetron (ZOFRAN) injection 4 mg  4 mg IntraVENous Q6H PRN    fentaNYL (PF) 1,500 mcg/30 mL PCA   IntraVENous TITRATE    bisacodyL (DULCOLAX) suppository 10 mg  10 mg Rectal DAILY PRN    glycopyrrolate (ROBINUL) injection 0.2 mg  0.2 mg IntraVENous Q4H PRN        PHYSICAL EXAM     Wt Readings from Last 3 Encounters:   12/08/22 66.7 kg (147 lb)   01/04/18 63.5 kg (140 lb)       Visit Vitals  /65   Pulse 96   Temp 96.9 °F (36.1 °C)   Resp 18   SpO2 (!) 89%       Supplemental O2  [] Yes  [x] NO  Last bowel movement: 2 weeks ago    Currently this patient has:  [] Peripheral IV [] PICC  [x] PORT [] ICD    [] Sigala Catheter [] NG Tube   [] PEG Tube    [] Rectal Tube [] Drain  [] Other:     Constitutional: Restless, agitated, confused this morning  Eyes: anicteric, EOMI  ENMT: slightly dry oral mucosa  Cardiovascular: reg  Respiratory: clear anteriorly  Gastrointestinal: abdomen is distended, firm, hypoactive bowel sounds, no grimacing with palpation today  Musculoskeletal:nl  Skin:intact  Neurologic: Sleeping, arousable   Psychiatric: Restless and agitated when she arouses    Pertinent Lab and or Imaging Tests:  Lab Results   Component Value Date/Time    Sodium 140 01/03/2018 11:59 AM    Potassium 3.7 01/03/2018 11:59 AM    Chloride 105 01/03/2018 11:59 AM    CO2 28 01/03/2018 11:59 AM    Anion gap 7 01/03/2018 11:59 AM    Glucose 96 01/03/2018 11:59 AM    BUN 15 01/03/2018 11:59 AM    Creatinine 0.80 01/03/2018 11:59 AM    BUN/Creatinine ratio 19 01/03/2018 11:59 AM    GFR est AA >60 01/03/2018 11:59 AM    GFR est non-AA >60 01/03/2018 11:59 AM    Calcium 8.9 01/03/2018 11:59 AM     Lab Results   Component Value Date/Time    Protein, total 7.6 01/03/2018 11:59 AM    Albumin 3.9 01/03/2018 11:59 AM           Total time:   Counseling / coordination time:   > 50% counseling / coordination?:

## 2023-03-21 NOTE — PROGRESS NOTES
Problem: Pain  Goal: *Control of acute pain  Outcome: Not Progressing Towards Goal     Problem: Falls - Risk of  Goal: *Absence of Falls  Description: Document Balta Fall Risk and appropriate interventions in the flowsheet. Outcome: Not Progressing Towards Goal  Note: Fall Risk Interventions:  Mobility Interventions: Bed/chair exit alarm    Mentation Interventions: Adequate sleep, hydration, pain control, Bed/chair exit alarm, Door open when patient unattended    Medication Interventions: Bed/chair exit alarm    Elimination Interventions: Bed/chair exit alarm, Call light in reach              Problem: Anxiety/Agitation  Goal: Verbalize or staff assess the ability to manage anxiety  Description: The patient/family/caregiver will verbalize and demonstrate ability to manage the patient's anxiety throughout hospice care.   Outcome: Not Progressing Towards Goal

## 2023-03-21 NOTE — PROGRESS NOTES
..  0700:Report received from [facility] using [employer] and [facility] Pt is awake and restless pulling her clothes off and moaning encourage to use PCA.  07:28:Adm Schedule VERSED pt pushed PCA.  0800:Pt sleeping, no distress noted. 0900:New bag verified with MILLI GILLIS, 0 waste. 1000:Pt is restless pulling gown, encourage to use PCA  43:91:GN Gokul rounds new order to add haldol for restlessness. 10:55:Pt is restless adm schedule haldol, assist with repositioning. 11:04:Pt is still restless pulling on adkins and covers, adm schedule VERSED. 11:50:Pt is restless pulling on tubes and adkins adm PRN VERSED.  1200:Pt port re access by Milli GILLIS. 12:15:Adm PRN Versed, gown and diaper changed. 13:42:Pt kneeling down in bed moaning and pulling on gown adm PRN VERSED. PT encourage to use PCA. 1400: Adm schedule VERSED, pt encourage to use PCA. 1500:Pt sleeping, no discomfort noted. 15:56:Pt pull covers and gown off, adm schedule VERSED and PHENobarbital, cofort with kids words. 16:20:Pt sleeping, no discomfort noted. 17:30:Pt resting quietly no distress noted. 1800: Adm schedule VERSED.  18:10:New bag verified with Milli GILLIS. 19:38:Rate verified with Sun Justice RN report given adm schedule VERSED for restlessness.                 NAME OF PATIENT:      LEVEL OF CARE:[facility]  REASON FOR GIP: Pain and restlessness     *PATIENT REMAINS ELIGIBLE FOR [facility] LEVEL OF CARE AS EVIDENCED BY:Pain and restlessness requiring frequent nursing assessments      REASON FOR RESPITE:n/a    O2 SAFETY:  Concentrator positioning (6\" from furniture/drapes), Tanks stored in sánchez , No petroleum based products on face while oxygen in use and Oxygen sign on the door     FALL INTERVENTIONS PROVIDED:   Implemented/recommended use of fall risk identification flag to all team members, Implemented/recommended assistive devices and encouraged their use, Implemented/recommended resources for alarm system (personal alarm, bed alarm, call bell, etc.)  and Implemented/recommended environmental changes (remove hazards, lower bed, improve lighting, etc.)     INTERDISPLINARY COMMUNICATION/COLLABORATION:  Physician, MSW, [place] and RN, CNA     [address] DOCUMENTATION:N/A      Reason medication is being initiated:  N/A     MD / Provider name consulted re: change in status / initiation of new medication:  N/A     New Symptom(s):  N/A     New Order(s):  N/A     Name of the person notified of the changes:  N/A     Name of person being taught:  N/A     Instructions given:  N/A     Side Effects taught:  N/A     Response to teaching:  N/A        COMFORTABLE DYING MEASURE:  Is Patient/family satisfied with symptom level?  yes     DISCHARGE PLAN:MD and MSW working on discharge planning.

## 2023-03-21 NOTE — HOSPICE
1900:  Report received from [address]:  Fentanyl PCA prescription and pump settings checked. Pt grimacing and moving around in bed. Demand dose of Fentanyl given. 1926:  Full assessment completed. Pt lethargic and nonverbal during assessment. Very pale. Scheduled versed given. 2030:  Lying in bed and resting with eyes closed. Resp shallow and unlabored. Neutral facial expression. 2202:  Scheduled Versed given IV. Started moving around in bed and pulling on sheets when aroused. 2313:  Appears to be sleeping. Resting quietly and calmly with eyes closed. Neutral facial expression. 0000:  Scheduled Versed given. Resting quietly with eyes closed. Resp even and unlabored. 0100:  Continues to sleep. Brow relaxed. 0157:  Scheduled Versed given IV. Inc of a small amount of liquid brown foul smelling stool. Helped pt turn. Pt bathed. Moaned some during bath. Demand dose of PCA given. Did not open eyes during care. 0310:  Lying in bed and resting quietly with eyes closed. Resp even and unlabored. Neutral facial expression. 0351:  Pt had kicked off covers, took off gown and pulled diaper apart. Moaning and restless. Slowly rolling from side to side. Whispering to self, \"I can't. \"  Grimacing. Demand dose of PCA given. Repositioned and reapplied linens, diaper and gown. Scheduled Versed given IV.  0406:  Lying still, but still has a facial grimace. Demand dose of fentanyl given. 0459:  Lying in bed and resting quietly with eyes closed. Neutral facial expression. Meds effective. 1259:  Scheduled Versed given IV. Pt resting quietly and calmly with eyes closed. Mouth breathing.  0700:  Report given to oncoming nurse.         NAME OF PATIENT:  Lexy Maher    LEVEL OF CARE:  GIP    REASON FOR GIP:   Pain, despite numerous changes in medications, Terminal agitation, despite changes to medications, Medication adjustment that must be monitored 24/7, and Stabilizing treatment that cannot take place at home    *PATIENT REMAINS ELIGIBLE FOR GIP LEVEL OF CARE AS EVIDENCED BY: Is unable to take po meds. Requires IV meds for symptom management. Requires frequent SN assessment and treatment of symptoms. (MUST BE ADDRESSED OF PATIENT GIP)      REASON FOR RESPITE:  N/A    O2 SAFETY:  N/A    FALL INTERVENTIONS PROVIDED:   Implemented/recommended resources for alarm system (personal alarm, bed alarm, call bell, etc.)     INTERDISPLINARY COMMUNICATION/COLLABORATION:  Physician, IGLESIA, Yaritza Knight, and RN, CNA    NEW MEDICATION INITIATION DOCUMENTATION:  N/A    Reason medication is being initiated:  N/A    MD / Provider name consulted re: change in status  initiation of new medication:  N/A    New Symptom(s):  N/A    New Order(s):  N/A    Name of the person notified of the changes:  N/A    Name of person being taught:  N/A    Instructions given:  N/A    Side Effects taught:  N/A    Response to teaching:  N/A      COMFORTABLE DYING MEASURE:  Is Patient/family satisfied with symptom level?  yes    DISCHARGE PLAN:  EOL at [facility].

## 2023-03-21 NOTE — PROGRESS NOTES
Problem: Nausea/Vomiting (Adult)  Goal: *Absence of nausea/vomiting  Outcome: Progressing Towards Goal  Goal: *Palliation of nausea/vomiting (Palliative Care)  Outcome: Progressing Towards Goal     Problem: Pressure Injury - Risk of  Goal: *Prevention of pressure injury  3/20/2023 2125 by Akanksha Han  Note: Pressure Injury Interventions:  Sensory Interventions: Assess changes in LOC, Avoid rigorous massage over bony prominences, Check visual cues for pain, Float heels, Keep linens dry and wrinkle-free    Moisture Interventions: Absorbent underpads, Apply protective barrier, creams and emollients, Check for incontinence Q2 hours and as needed    Activity Interventions: Pressure redistribution bed/mattress(bed type)    Mobility Interventions: Float heels, HOB 30 degrees or less    Nutrition Interventions: Document food/fluid/supplement intake, Offer support with meals,snacks and hydration    Friction and Shear Interventions: HOB 30 degrees or less, Lift sheet, Minimize layers             3/20/2023 2123 by Akanksha Han  Outcome: Progressing Towards Goal  Note: Pressure Injury Interventions:  Sensory Interventions: Assess changes in LOC, Avoid rigorous massage over bony prominences, Check visual cues for pain, Float heels, Keep linens dry and wrinkle-free    Moisture Interventions: Absorbent underpads, Apply protective barrier, creams and emollients, Check for incontinence Q2 hours and as needed    Activity Interventions: Pressure redistribution bed/mattress(bed type)    Mobility Interventions: Float heels, HOB 30 degrees or less    Nutrition Interventions: Document food/fluid/supplement intake, Offer support with meals,snacks and hydration    Friction and Shear Interventions: HOB 30 degrees or less, Lift sheet, Minimize layers                Problem: Pressure Injury - Risk of  Goal: *Prevention of pressure injury  Description: Document Markie Scale and appropriate interventions in the flowsheet.   3/20/2023 2125 by Jessy Dominguez  Note: Pressure Injury Interventions:  Sensory Interventions: Assess changes in LOC, Avoid rigorous massage over bony prominences, Check visual cues for pain, Float heels, Keep linens dry and wrinkle-free    Moisture Interventions: Absorbent underpads, Apply protective barrier, creams and emollients, Check for incontinence Q2 hours and as needed    Activity Interventions: Pressure redistribution bed/mattress(bed type)    Mobility Interventions: Float heels, HOB 30 degrees or less    Nutrition Interventions: Document food/fluid/supplement intake, Offer support with meals,snacks and hydration    Friction and Shear Interventions: HOB 30 degrees or less, Lift sheet, Minimize layers             3/20/2023 2123 by Jessy Dominguez  Outcome: Progressing Towards Goal  Note: Pressure Injury Interventions:  Sensory Interventions: Assess changes in LOC, Avoid rigorous massage over bony prominences, Check visual cues for pain, Float heels, Keep linens dry and wrinkle-free    Moisture Interventions: Absorbent underpads, Apply protective barrier, creams and emollients, Check for incontinence Q2 hours and as needed    Activity Interventions: Pressure redistribution bed/mattress(bed type)    Mobility Interventions: Float heels, HOB 30 degrees or less    Nutrition Interventions: Document food/fluid/supplement intake, Offer support with meals,snacks and hydration    Friction and Shear Interventions: HOB 30 degrees or less, Lift sheet, Minimize layers                Problem: Falls - Risk of  Goal: *Absence of Falls  Description: Document Balta Fall Risk and appropriate interventions in the flowsheet.   Outcome: Progressing Towards Goal  Note: Fall Risk Interventions:  Mobility Interventions: Bed/chair exit alarm    Mentation Interventions: Adequate sleep, hydration, pain control, Bed/chair exit alarm, Door open when patient unattended    Medication Interventions: Bed/chair exit alarm    Elimination Interventions: Bed/chair exit alarm, Call light in reach              Problem: Potential for Alteration in Skin Integrity  Goal: Monitor skin for areas of alteration in skin integrity  Description: Patient/family/caregiver will demonstrate ability to care for patient's skin, monitor for areas of breakdown, and demonstrate methods to prevent breakdown during hospice care. Outcome: Progressing Towards Goal     Problem: Risk for Falls  Goal: Free of falls during inpatient stay  Description: Patient will be free of falls during inpatient stay.   Outcome: Progressing Towards Goal     Problem: Pain  Goal: Assess satisfaction of level of comfort and symptom control  Outcome: Progressing Towards Goal  Goal: *Control of acute pain  Outcome: Progressing Towards Goal     Problem: Pressure Injury - Risk of  Goal: *Prevention of pressure injury  Outcome: Progressing Towards Goal  Note: Pressure Injury Interventions:  Sensory Interventions: Assess changes in LOC, Avoid rigorous massage over bony prominences, Check visual cues for pain, Float heels, Keep linens dry and wrinkle-free    Moisture Interventions: Absorbent underpads, Apply protective barrier, creams and emollients, Check for incontinence Q2 hours and as needed    Activity Interventions: Pressure redistribution bed/mattress(bed type)    Mobility Interventions: Float heels, HOB 30 degrees or less    Nutrition Interventions: Document food/fluid/supplement intake, Offer support with meals,snacks and hydration    Friction and Shear Interventions: HOB 30 degrees or less, Lift sheet, Minimize layers                Problem: Infection - Risk of, Urinary Catheter-Associated Urinary Tract Infection  Goal: *Absence of infection signs and symptoms  Outcome: Progressing Towards Goal

## 2023-03-22 NOTE — PROGRESS NOTES
1900 Report received from [place], [place]. Pt is [facility] level of care for management of pain and agitation. Dx Ovarian Cancer. PCA rate and line patency verified with [place], LPN. 1930 Pt is restless in bed, pulling at IV tubing and restless in bed. Family visiting. Pt has had moderate loose BM. Skin care given and pt medicated with scheduled IV medications. Pt calmed immediately. Repositioned to her left side . 2030 Resting quietly, all family have gone. 2130 Neutral facial expression. Resting quietly  2210 Pt found with covers off, depends pulled off. Has pulled Right port needle out. Very restless in bed. Right port reaccessed, np blood return. Flushes with ease. Scheduled IV medications given. Pt calmed after meds and is now sleeping. Had small loose stool. Skin care given. Diaper placed. 2300 Resting quietly. Neutral facial expression. Report given to ELIS Groves        NAME OF PATIENT:  Katie Pichardo    LEVEL OF CARE:  GIP    REASON FOR GIP:   Pain, despite numerous changes in medications, Terminal agitation, despite changes to medications, Medication adjustment that must be monitored 24/7, and Stabilizing treatment that cannot take place at home    *PATIENT REMAINS ELIGIBLE FOR [facility] LEVEL OF CARE AS EVIDENCED BY: Frequent nurse assessment of symptom relief of patient and medication effectiveness.       REASON FOR RESPITE:  na    O2 SAFETY:  na    FALL INTERVENTIONS PROVIDED:   Implemented/recommended use of fall risk identification flag to all team members, Implemented/recommended resources for alarm system (personal alarm, bed alarm, call bell, etc.) , Implemented/recommended environmental changes (remove hazards, lower bed, improve lighting, etc.), and Implemented/recommended increased supervision/assistance    INTERDISPLINARY COMMUNICATION/COLLABORATION:  Physician, MSW, [place], and RN, CNA    NEW MEDICATION INITIATION DOCUMENTATION:  na    Reason medication is being initiated:  no med changes indicated    MD / Provider name consulted re: change in status / initiation of new medication:  na    New Symptom(s):  na    New Order(s):  na    Name of the person notified of the changes:  na    Name of person being taught:  na    Instructions given:  na    Side Effects taught:  na    Response to teaching:  na      COMFORTABLE DYING MEASURE:  Is Patient/family satisfied with symptom level?  yes    DISCHARGE PLAN:  Remain GIP until symptoms are managed and pt can be cared for in the home.   Report given to Xuan Gil

## 2023-03-22 NOTE — PROGRESS NOTES
[address] [facility] Help to Those in Need  [phone]    Patient Name: Jacqueline Ellison  YOB: 1955    Date of Provider Hospice Visit: 03/22/23    Level of Care:   [x] General Inpatient (GIP)    [] Routine   [] Respite    Current Location of Care:  [] [facility] [] [facility] [] [facility] [] [facility] [x] Hospice House THE [facility], patient referred from:  [] [facility] [] [facility] [] [facility] [] [facility] [] Home [x] Other:     Date of Original Hospice Admission: 3/5/2023  Hospice Medical Director at time of admission: Dr. Mara Obrien Diagnosis: Stage IV ovarian cancer  Diagnoses RELATED to the terminal prognosis: nausea, vomiting, constipation, abdominal pain, carcinomatosis, hx TIA 2018  Other Diagnoses: depression, hypothyroidism      HOSPICE SUMMARY   Do not cut and paste chart information other than imaging findings    Jacqueline Ellison is a 78y.o. year old who was admitted to [facility]. She has extensive history of ovarian cancer treatments beginning in January of 2020. S/p open debulking on 4/28/2020, long term chemo. Last given in July 2020 Patient had a CT scan 4/17/21 with multiple intraperitoneal nodules new from previous scan. The patient's principle diagnosis has resulted in ongoing abdominal pain, now with nausea that is difficult to control. She has been vomiting with intake of food or pills, only keeping down sips of water and tea this week. Refer to LCD     Functionally, the patient's Karnofsky and/or Palliative Performance Scale has declined over a period of months and is estimated at 40. The patient is dependent on the following ADLs:  She requires assistance for bathing    Objective information that support this patients limited prognosis includes: progression of disease by CT scan, unable to tolerate more chemo. Decline in PO intake, dysgeusia, anorexia, ongoing nausea. , uncontrolled abdominal pain    The patient/family chose comfort measures with the support of Hospice. HOSPICE DIAGNOSES   Active Symptoms:  1. Nausea and vomiting.  - not improved with home regimen  2. Constipation, difficulty managing at home  3. Cancer related abdominal pain, and unable to keep down liquid morphine at home  4. Stage IV ovarian cancer with peritoneal carcinomatosis  5. Agitation/delirium     PLAN   Patient will continue [facility] level care as patient needs frequent nursing assessments, IV medication management-not tolerating anything orally at home and pain just not managed. Despite multiple adjustments in medications yesterday, patient still with delirium, pain(especially with movement). Pain management-adjust fentanyl to 100 mcg basal and 80 mcg bolus dose. She had 6 attempts/6 delivered overnight. I am concerned that some of this delirium could be pain but also neuroexcitability related to opiods. Really can not rotate opioids as I think morphine would be worse and dilaudid caused N/V. Could consider other options such as IV methadone or even ketamine if this trend continues. Nausea and vomiting-IV medications for this have been held as she has not been eating and drinking  Anxiety/delirium-continue versed 4 mg IV every 2, phenobarbital 130 mg every 8 and will considering adding haldol 5 mg IV every 4-will give one time dose to see if helps  Plan will be reviewed with [facility] hospice team  Plan reviewed with bedside nursing team.  No family at bedside this morning but we have been updating them   and SW to support family needs  Disposition: anticipate ongoing decline, and death in the [facility]. Hospice Plan of care was reviewed in detail and agree with current plan of care    Discussed with bedside nurse and also with [facility] hospice RN  Prognosis estimated based on 03/22/23 clinical assessment is:   [x] Hours to Days    [] Days to Weeks    [] Other:    Communicated plan of care with: Hospice Case Manager;  Hospice IDT; Care Team     GOALS OF CARE     Patient/Medical POA stated Goal of Care: goal of care is comfort     [x] I have reviewed and/or updated ACP information in the Advance Care Planning Navigator. This information is available in the [address] link in the patient's chart header. Primary Decision [facility] Agent):   Primary Decision Maker: Aidan Cornejo - Daughter - [phone]    Secondary Decision Maker: Ivelisse Burdick - Brother - [phone]    Resuscitation Status: DNR  If DNR is there a Durable DNR on file? : [x] Yes [] No (If no, complete Durable DNR)    HISTORY     History obtained from: patient, hospice nurse    CHIEF COMPLAINT: nausea, dysgeusia, vomiting, poor PO intake, constipation  The patient is:   [x] Verbal  [] Nonverbal  [] Unresponsive    HPI/SUBJECTIVE:    78year old female at home supported by [employer]. She has about one week of difficult to control symptoms. Fentanyl patch and liquid morphine for pain has not been effective as she is not able to keep the morphine down. The patch was changed to Q48 hrs when it was noted that the 3rd day was when she had more pain. She has been using enemas and senokot (2 tabs BID) but still has not had a BM in the past 5 days (since Tuesday)  She is vomiting up meds or food but is able to keep down sips of water and tea. Pain is 4/10 right now across her abdomen, it is worse with ambulation  No real change in distention, some slight bloating since summertime    3/6-continues to describe diffuse pain in her abdomen. Even with just palpation of my stethoscope cause discomfort. Continuing not eating or drinking well    3/7-patient continues to have significant pain in her abdomen. Feels like her abdomen is more distended. 3/8-patient anxious but feels like pain is better managed overall. Still with nausea    3/9-patient appears more comfortable this morning. A little tearful at times.   Nausea and pain seems somewhat improved    3/10-patient appears much more comfortable this morning. She actually was able to have a little bit of a smile. Still only tolerating ice chips. 3/11- patient drowsy but wakes from sleep, slowed processing evident, irritable today, major shift in personality per RN    3/13-patient arouses easily. Definitely struggling with processing and coming up with her words. A little bit more irritable today than when I saw her on Friday. 3/14-patient appears little more ashen. A little slow to respond. Definitely getting weaker. Discussed the possibility of a Sigala catheter and she will think about it    3/15-patient very anxious and tearful this morning. 3/16-patient sleeping multiple times when I checked on her    3/17-patient is resting and appears much more comfortable. I think this is actually the best she has looked in several days and she actually looks at peace. 3/18/23: patient aroused said \": hi baby ' to his brother at bed side, some restlessness noted . Per bed side Rn patient requires 4 doses of fentanyl bolus in last 4 hrs and two doses of haldol prn today . 3/20-patient much more restless. Clearly the Versed is not lasting long enough but it is effective when she takes it.   Adjustments have been made    3/21-patient significantly more restless earlier today but with adjustment in her medication, addition of Haldol, and adjustment in her needle to the port, patient does appear more comfortable    3/22-appears to be resting right now but reviewed nurses notes on her restlessness       REVIEW OF SYSTEMS     The following systems were: [x] reviewed  [] unable to be reviewed    Positive ROS include:  Constitutional: fatigue, weakness, in pain especially with movement  Ears/nose/mouth/throat:   Respiratory:  Gastrointestinal:poor appetite, nausea, vomiting, abdominal pain, constipation - last BM Tuesday  Everything tastes horrible  Musculoskeletal:pain,   Neurologic:poor sleep, not more than 1-2 hours per night      Adult Non-Verbal Pain Assessment Score: 2    Face  [] 0   No particular expression or smile  [x] 1   Occasional grimace, tearing, frowning, wrinkled forehead  [] 2   Frequent grimace, tearing, frowning, wrinkled forehead    Activity (movement)  [] 0   Lying quietly, normal position  [x] 1   Seeking attention through movement or slow, cautious movement  [] 2   Restless, excessive activity and/or withdrawal reflexes    Guarding  [x] 0   Lying quietly, no positioning of hands over areas of body  [] 1   Splinting areas of the body, tense  [] 2   Rigid, stiff    Physiology (vital signs)  [] 0   Stable vital signs  [x] 1   Change in any of the following: SBP > 20mm Hg; HR > 20/minute  [] 2   Change in any of the following: SBP > 30mm Hg; HR > 25/minute    Respiratory  [x] 0   Baseline RR/SpO2, compliant with ventilator  [] 1   RR > 10 above baseline, or 5% drop SpO2, mild asynchrony with ventilator  [] 2   RR > 20 above baseline, or 10% drop SpO2, asynchrony with ventilator     FUNCTIONAL ASSESSMENT     Palliative Performance Scale (PPS):20       PSYCHOSOCIAL/SPIRITUAL ASSESSMENT     Active Problems:    Ovarian cancer ([facility] [address] (3/5/2023)    Past Medical History:   Diagnosis Date    Anxiety     Hypotension     Thyroid disease       No past surgical history on file. Social History     Tobacco Use    Smoking status: Not on file    Smokeless tobacco: Not on file   Substance Use Topics    Alcohol use: Not on file     No family history on file.    Allergies   Allergen Reactions    Hydromorphone Nausea and Vomiting    Sulfa (Sulfonamide Antibiotics) Diarrhea and Nausea and Vomiting      Current Facility-Administered Medications   Medication Dose Route Frequency    PHENobarbital (LUMINAL) injection 130 mg  130 mg IntraVENous TID    midazolam (VERSED) injection 4 mg  4 mg IntraVENous Q2H    midazolam (VERSED) injection 4 mg  4 mg IntraVENous Q15MIN PRN    ondansetron (ZOFRAN) injection 4 mg  4 mg IntraVENous Q6H PRN    fentaNYL (PF) 1,500 mcg/30 mL PCA   IntraVENous TITRATE    bisacodyL (DULCOLAX) suppository 10 mg  10 mg Rectal DAILY PRN    glycopyrrolate (ROBINUL) injection 0.2 mg  0.2 mg IntraVENous Q4H PRN        PHYSICAL EXAM     Wt Readings from Last 3 Encounters:   12/08/22 66.7 kg (147 lb)   01/04/18 63.5 kg (140 lb)       Visit Vitals  BP (!) 96/55   Pulse 61   Temp 97.3 °F (36.3 °C)   Resp 20   SpO2 94%       Supplemental O2  [] Yes  [x] NO  Last bowel movement: 2 weeks ago    Currently this patient has:  [] Peripheral IV [] PICC  [x] PORT [] ICD    [] Sigala Catheter [] NG Tube   [] PEG Tube    [] Rectal Tube [] Drain  [] Other:     Constitutional: resting right now, no grimacing  Eyes: anicteric, EOMI  ENMT: slightly dry oral mucosa  Cardiovascular: reg  Respiratory: clear anteriorly  Gastrointestinal: abdomen is distended, firm, hypoactive bowel sounds, no grimacing with palpation today  Musculoskeletal:nl  Skin:intact  Neurologic: Sleeping, arousable   Psychiatric: Restless and agitated when she arouses    Pertinent Lab and or Imaging Tests:  Lab Results   Component Value Date/Time    Sodium 140 01/03/2018 11:59 AM    Potassium 3.7 01/03/2018 11:59 AM    Chloride 105 01/03/2018 11:59 AM    CO2 28 01/03/2018 11:59 AM    Anion gap 7 01/03/2018 11:59 AM    Glucose 96 01/03/2018 11:59 AM    BUN 15 01/03/2018 11:59 AM    Creatinine 0.80 01/03/2018 11:59 AM    BUN/Creatinine ratio 19 01/03/2018 11:59 AM    GFR est AA >60 01/03/2018 11:59 AM    GFR est non-AA >60 01/03/2018 11:59 AM    Calcium 8.9 01/03/2018 11:59 AM     Lab Results   Component Value Date/Time    Protein, total 7.6 01/03/2018 11:59 AM    Albumin 3.9 01/03/2018 11:59 AM           Total time:   Counseling / coordination time:   > 50% counseling / coordination?:

## 2023-03-22 NOTE — HOSPICE
2300: [facility] [address] Fentanyl PCA rate verified with Omar Caoside: scheduled IV versed given, pt with occasional grimace, PCA bolus button pressed. Pt is lying quietly with eyes closed. Respirations are even and unlabored. Skin is very pale. 0028: pt is resting quietly, no grimacing or furrowed brow noted     0115: pt is resting quietly with eyes closed    0200: pt turned onto R side, pillow placed between knees. Pt grimaced and moaned during repositioning, PCA bolus button pressed.  Scheduled IV versed given     77 [id number] 003: pt resting quietly with eyes closed     0345: pt is resting with relaxed facial expression     0416: scheduled IV versed given     0500: PRN IV versed given, pt is resisting having brief changed (pushing against nurses and clenching arms)    0639: pt holding head and grimacing, PCA bolus button pressed, Scheduled IV versed given    0700: report given to oncoming nurse

## 2023-03-22 NOTE — PROGRESS NOTES
Problem: Pressure Injury - Risk of  Goal: *Prevention of pressure injury  Outcome: Progressing Towards Goal  Note: Pressure Injury Interventions:  Sensory Interventions: Assess changes in LOC    Moisture Interventions: Absorbent underpads, Apply protective barrier, creams and emollients    Activity Interventions: Pressure redistribution bed/mattress(bed type)    Mobility Interventions: Float heels, HOB 30 degrees or less    Nutrition Interventions:  (NPO)    Friction and Shear Interventions: HOB 30 degrees or less, Lift sheet, Minimize layers

## 2023-03-22 NOTE — PROGRESS NOTES
Problem: Nausea/Vomiting (Adult)  Goal: *Absence of nausea/vomiting  Outcome: Progressing Towards Goal  Goal: *Palliation of nausea/vomiting (Palliative Care)  Outcome: Progressing Towards Goal     Problem: Patient Education: Go to Patient Education Activity  Goal: Patient/Family Education  Outcome: Progressing Towards Goal     Problem: Breathing Pattern - Ineffective  Goal: *Use of effective breathing techniques  Outcome: Progressing Towards Goal     Problem: Pain  Goal: *Control of acute pain  Outcome: Progressing Towards Goal     Problem: Pressure Injury - Risk of  Goal: *Prevention of pressure injury  Outcome: Progressing Towards Goal  Note: Pressure Injury Interventions:  Sensory Interventions: Assess changes in LOC, Assess need for specialty bed, Check visual cues for pain    Moisture Interventions: Absorbent underpads, Limit adult briefs, Maintain skin hydration (lotion/cream)    Activity Interventions: Pressure redistribution bed/mattress(bed type)    Mobility Interventions: Float heels, HOB 30 degrees or less    Nutrition Interventions:  (Unable to tolerate PO intake)    Friction and Shear Interventions: HOB 30 degrees or less                Problem: Grieving  Goal: *Able to express feelings of grief  Outcome: Progressing Towards Goal  Goal: *Able to identify stages of grieving process  Outcome: Progressing Towards Goal     Problem: Mood - Altered  Goal: *Alleviation of anxiety and depressive symptoms  Outcome: Progressing Towards Goal     Problem: Discharge Planning  Goal: *Participates in discharge planning  Outcome: Progressing Towards Goal     Problem: Patient Education: Go to Patient Education Activity  Goal: Patient/Family Education  Outcome: Progressing Towards Goal     Problem: Pressure Injury - Risk of  Goal: *Prevention of pressure injury  Description: Document Markie Scale and appropriate interventions in the flowsheet.   Outcome: Progressing Towards Goal  Note: Pressure Injury Interventions:  Sensory Interventions: Assess changes in LOC, Assess need for specialty bed, Check visual cues for pain    Moisture Interventions: Absorbent underpads, Limit adult briefs, Maintain skin hydration (lotion/cream)    Activity Interventions: Pressure redistribution bed/mattress(bed type)    Mobility Interventions: Float heels, HOB 30 degrees or less    Nutrition Interventions:  (Unable to tolerate PO intake)    Friction and Shear Interventions: HOB 30 degrees or less                Problem: Patient Education: Go to Patient Education Activity  Goal: Patient/Family Education  Outcome: Progressing Towards Goal     Problem: Falls - Risk of  Goal: *Absence of Falls  Description: Document Balta Fall Risk and appropriate interventions in the flowsheet. Outcome: Progressing Towards Goal  Note: Fall Risk Interventions:  Mobility Interventions: Bed/chair exit alarm    Mentation Interventions: Adequate sleep, hydration, pain control, Bed/chair exit alarm, Door open when patient unattended    Medication Interventions: Bed/chair exit alarm    Elimination Interventions: Bed/chair exit alarm, Call light in reach              Problem: Patient Education: Go to Patient Education Activity  Goal: Patient/Family Education  Outcome: Progressing Towards Goal     Problem: Infection - Risk of, Central Venous Catheter-Associated Bloodstream Infection  Goal: *Absence of infection signs and symptoms  Outcome: Progressing Towards Goal     Problem: Potential for Alteration in Skin Integrity  Goal: Monitor skin for areas of alteration in skin integrity  Description: Patient/family/caregiver will demonstrate ability to care for patient's skin, monitor for areas of breakdown, and demonstrate methods to prevent breakdown during hospice care. Outcome: Progressing Towards Goal     Problem: Risk for Falls  Goal: Free of falls during inpatient stay  Description: Patient will be free of falls during inpatient stay.   Outcome: Progressing Towards Goal     Problem: Alteration in Mobility  Goal: Remain as independent as possible and remain safe in environment  Description: Patient will remain as independent as possible and remain safe in their environment. Outcome: Progressing Towards Goal     Problem: Pain  Goal: Assess satisfaction of level of comfort and symptom control  Outcome: Progressing Towards Goal  Goal: *Control of acute pain  Outcome: Progressing Towards Goal     Problem: Anxiety/Agitation  Goal: Verbalize or staff assess the ability to manage anxiety  Description: The patient/family/caregiver will verbalize and demonstrate ability to manage the patient's anxiety throughout hospice care. Outcome: Progressing Towards Goal     Problem: Communication Deficit  Goal: Effectively communicate symptoms, needs, and concerns  Description: Patient/family/caregiver will effectively communicate symptoms, needs and concerns. Outcome: Progressing Towards Goal     Problem: Coping and Emotional Distress  Goal: Demonstrate acceptance of terminal illness and understanding of disease progression  Description: Patient/family/caregiver will demonstrate acceptance of terminal disease and understanding of disease progression while employing appropriate coping mechanisms.   Outcome: Progressing Towards Goal     Problem: Pressure Injury - Risk of  Goal: *Prevention of pressure injury  Outcome: Progressing Towards Goal     Problem: Nausea/Vomiting (Adult)  Goal: *Absence of nausea/vomiting  Outcome: Progressing Towards Goal     Problem: Infection - Risk of, Central Venous Catheter-Associated Bloodstream Infection  Goal: *Absence of infection signs and symptoms  Outcome: Progressing Towards Goal     Problem: Infection - Risk of, Urinary Catheter-Associated Urinary Tract Infection  Goal: *Absence of infection signs and symptoms  Outcome: Progressing Towards Goal     Problem: Infection - Risk of, Urinary Catheter-Associated Urinary Tract Infection  Goal: *Absence of infection signs and symptoms  Outcome: Progressing Towards Goal     Problem: Imminent Death  Goal: Collaborate with patient/family/caregiver/interdisciplinary team to minimize and manage end of life symptoms  Outcome: Progressing Towards Goal

## 2023-03-23 NOTE — PROGRESS NOTES
Problem: Nausea/Vomiting (Adult)  Goal: *Absence of nausea/vomiting  Outcome: Resolved/Met  Goal: *Palliation of nausea/vomiting (Palliative Care)  Outcome: Resolved/Met     Problem: Pain  Goal: *Control of acute pain  Outcome: Progressing Towards Goal     Problem: Pressure Injury - Risk of  Goal: *Prevention of pressure injury  Outcome: Progressing Towards Goal  Note: Pressure Injury Interventions:  Sensory Interventions: Assess changes in LOC, Assess need for specialty bed, Check visual cues for pain    Moisture Interventions: Absorbent underpads, Limit adult briefs, Maintain skin hydration (lotion/cream)    Activity Interventions: Pressure redistribution bed/mattress(bed type)    Mobility Interventions: Float heels, HOB 30 degrees or less    Nutrition Interventions:  (Unable to tolerate PO intake)    Friction and Shear Interventions: HOB 30 degrees or less                Problem: Mood - Altered  Goal: *Alleviation of anxiety and depressive symptoms  Outcome: Progressing Towards Goal     Problem: Falls - Risk of  Goal: *Absence of Falls  Description: Document Balta Fall Risk and appropriate interventions in the flowsheet. Outcome: Progressing Towards Goal  Note: Fall Risk Interventions:  Mobility Interventions: Bed/chair exit alarm    Mentation Interventions: Adequate sleep, hydration, pain control, Bed/chair exit alarm, Door open when patient unattended    Medication Interventions: Bed/chair exit alarm    Elimination Interventions: Bed/chair exit alarm, Call light in reach              Problem: Potential for Alteration in Skin Integrity  Goal: Monitor skin for areas of alteration in skin integrity  Description: Patient/family/caregiver will demonstrate ability to care for patient's skin, monitor for areas of breakdown, and demonstrate methods to prevent breakdown during hospice care.   Outcome: Progressing Towards Goal     Problem: Risk for Falls  Goal: Free of falls during inpatient stay  Description: Patient will be free of falls during inpatient stay. Outcome: Progressing Towards Goal     Problem: Pain  Goal: Assess satisfaction of level of comfort and symptom control  Outcome: Progressing Towards Goal     Problem: Anxiety/Agitation  Goal: Verbalize or staff assess the ability to manage anxiety  Description: The patient/family/caregiver will verbalize and demonstrate ability to manage the patient's anxiety throughout hospice care.   Outcome: Progressing Towards Goal     Problem: Infection - Risk of, Central Venous Catheter-Associated Bloodstream Infection  Goal: *Absence of infection signs and symptoms  Outcome: Progressing Towards Goal     Problem: Infection - Risk of, Urinary Catheter-Associated Urinary Tract Infection  Goal: *Absence of infection signs and symptoms  Outcome: Progressing Towards Goal

## 2023-03-23 NOTE — PROGRESS NOTES
1900 Report received from [facility], [address] [place] [address] Dillon PCA verified at bedside w/ Gopi doll RN. Family at bedside. 2030 Patient resting in bed, eyes closed, neutral facial expression observed. 2042 scheduled haldol and ativan administered, see MAR.  2140 Patient resting in bed, eyes closed, respirations unlabored. 2240 Scheduled phenobarbital administered, see MAR.  2330 Patient resting in bed, eyes closed, respirations unlabored. 0015 Scheduled haldol and ativan administered, see MAR.  0100 Patient resting in bed, eyes closed, respirations unlabored. 0200 Pt given bed bath by JON Damon.  0245 Patient resting in bed, eyes closed, respirations shallow, unlabored. 5303 New PCA syringe placed, verified at bedside w/ ELIS Gordon.  2144 Scheduled ativan and haldol administered, see MAR.  0500 Patient resting in bed, eyes closed, respirations shallow. 0600 Patient resting in bed, eyes closed, respirations unlabored. 0700 Report given to oncoming RN.         NAME OF PATIENT:  Stasia Gilford    LEVEL OF CARE:  GIP    REASON FOR GIP:   Medication adjustment that must be monitored 24/7 and Stabilizing treatment that cannot take place at home    *PATIENT REMAINS ELIGIBLE FOR GIP LEVEL OF CARE AS EVIDENCED BY: (MUST BE ADDRESSED OF PATIENT GIP) pt requiring scheduled IV medication, fentanyl PCA, frequent RN assessment and interventions      REASON FOR RESPITE:  N/A    O2 SAFETY:  N/A    FALL INTERVENTIONS PROVIDED:   Implemented/recommended resources for alarm system (personal alarm, bed alarm, call bell, etc.)  and Implemented/recommended environmental changes (remove hazards, lower bed, improve lighting, etc.)    INTERDISPLINARY COMMUNICATION/COLLABORATION:  Physician, MSW, Simona Gifford, and RNJON    NEW MEDICATION INITIATION DOCUMENTATION:  N/A    Reason medication is being initiated:  N/A    MD / Provider name consulted re: change in status / initiation of new medication:  N/A    New Symptom(s):  N/A    New Order(s):  N/A    Name of the person notified of the changes:  N/A    Name of person being taught:  N/A    Instructions given:  N/A    Side Effects taught:  N/A    Response to teaching:  N/A      COMFORTABLE DYING MEASURE:  Is Patient/family satisfied with symptom level?  yes    DISCHARGE PLAN:  end of life

## 2023-03-23 NOTE — PROGRESS NOTES
Problem: Pain  Goal: *Control of acute pain  Outcome: Progressing Towards Goal     Problem: Pressure Injury - Risk of  Goal: *Prevention of pressure injury  Outcome: Progressing Towards Goal  Note: Pressure Injury Interventions:  Sensory Interventions: Assess changes in LOC, Check visual cues for pain    Moisture Interventions: Absorbent underpads, Maintain skin hydration (lotion/cream)    Activity Interventions: Pressure redistribution bed/mattress(bed type)    Mobility Interventions: Float heels, HOB 30 degrees or less    Nutrition Interventions:  (too lethargic to tolerate PO intake)    Friction and Shear Interventions: HOB 30 degrees or less, Lift sheet                Problem: Pressure Injury - Risk of  Goal: *Prevention of pressure injury  Description: Document Markie Scale and appropriate interventions in the flowsheet. Outcome: Progressing Towards Goal  Note: Pressure Injury Interventions:  Sensory Interventions: Assess changes in LOC, Check visual cues for pain    Moisture Interventions: Absorbent underpads, Maintain skin hydration (lotion/cream)    Activity Interventions: Pressure redistribution bed/mattress(bed type)    Mobility Interventions: Float heels, HOB 30 degrees or less    Nutrition Interventions:  (too lethargic to tolerate PO intake)    Friction and Shear Interventions: HOB 30 degrees or less, Lift sheet                Problem: Falls - Risk of  Goal: *Absence of Falls  Description: Document Balta Fall Risk and appropriate interventions in the flowsheet.   Outcome: Progressing Towards Goal  Note: Fall Risk Interventions:  Mobility Interventions: Bed/chair exit alarm    Mentation Interventions: Bed/chair exit alarm, Reorient patient    Medication Interventions: Bed/chair exit alarm    Elimination Interventions: Bed/chair exit alarm              Problem: Infection - Risk of, Central Venous Catheter-Associated Bloodstream Infection  Goal: *Absence of infection signs and symptoms  Outcome: Progressing Towards Goal     Problem: Risk for Falls  Goal: Free of falls during inpatient stay  Description: Patient will be free of falls during inpatient stay. Outcome: Progressing Towards Goal     Problem: Pain  Goal: Assess satisfaction of level of comfort and symptom control  Outcome: Progressing Towards Goal  Goal: *Control of acute pain  Outcome: Progressing Towards Goal     Problem: Anxiety/Agitation  Goal: Verbalize or staff assess the ability to manage anxiety  Description: The patient/family/caregiver will verbalize and demonstrate ability to manage the patient's anxiety throughout hospice care.   Outcome: Progressing Towards Goal     Problem: Imminent Death  Goal: Collaborate with patient/family/caregiver/interdisciplinary team to minimize and manage end of life symptoms  Outcome: Progressing Towards Goal

## 2023-03-23 NOTE — HOSPICE
1900:  Report given to oncoming nurse. 1923:  Fentanyl PCA pump settings and prescription checked. Verified with Richie Yu  2010:  Scheduled IV meds given for symptom management. Assessed. Minimally responsive. Simmons Fines. Kept eyes closed. Nonverbal.  Weakly flailed arm when touched. 2100:  Lying in bed and resting quietly with eyes closed. Resp even and unlabored. Neutral facial expression. 2156:  Scheduled IV meds given for symptom management. Weakly flailed arm when touched.    2300:  Resp shallow and unlabored. Neutral facial expression. 0001:  Scheduled IV meds for symptom management given. Rolled over and moaned. Demand dose of fentanyl given. 0045: Lying in bed and resting quietly with eyes closed. Resp shallow and unlabored. Neutral facial expression. 0150:  Scheduled Versed given for symptom management. Weakly flailed arm and mumbled. 0300:  Appears to be sleeping. Relaxed facial expression. 0350:  New PCA syringe hung. Rate and prescription verified with ELIS Hernández.  0354:  Moaning. Sat up in bed briefly. Flinging arms. Demand dose of Fentanyl PCA given. Scheduled IV meds for agitation given. 0500:  Lying in bed and resting quietly with eyes closed. Mouth breathing. Tongue dry.  0602:  Rolling around in bed and moaning. Versed 4 mg given IV. Demand dose of fentanyl given. 0630:  Lying in bed and resting quietly with eyes closed. Neutral facial expression. 1143:  Report given to oncoming nurse. NAME OF PATIENT:  Brittaney Keith    LEVEL OF CARE:  GIP    REASON FOR GIP:   Pain, despite numerous changes in medications, Terminal agitation, despite changes to medications, Medication adjustment that must be monitored 24/7, and Stabilizing treatment that cannot take place at home    *PATIENT REMAINS ELIGIBLE FOR GIP LEVEL OF CARE AS EVIDENCED BY: Unable to take po meds. Requires IV meds for symptom management.   Requires frequnt SN assessment and treatment of symptoms. (MUST BE ADDRESSED OF PATIENT GIP)      REASON FOR RESPITE:  N/A    O2 SAFETY:  N/A    FALL INTERVENTIONS PROVIDED:   Implemented/recommended resources for alarm system (personal alarm, bed alarm, call bell, etc.)     INTERDISPLINARY COMMUNICATION/COLLABORATION:  Physician, IGLESIA, Chetna Guerra, and RN, JON    NEW MEDICATION INITIATION DOCUMENTATION:  N/A    Reason medication is being initiated:  N/A    MD / Provider name consulted re: change in status / initiation of new medication:  N/A    New Symptom(s):  N/A    New Order(s):  N/A    Name of the person notified of the changes:  N/A    Name of person being taught:  N/A    Instructions given:  N/A    Side Effects taught:  N/A    Response to teaching:  N/A      COMFORTABLE DYING MEASURE:  Is Patient/family satisfied with symptom level?  yes    DISCHARGE PLAN:  probable EOL at [facility]. If stabilized will return home.

## 2023-03-23 NOTE — PROGRESS NOTES
0700  report received  PCA verified with Karina Morrison. 0710  Pt lying in bed, asleep. Pt becomes restless with stimulization. Affect if flat. No grimacing. Lungs are clear. Pt is on RA. No cough noted. BS are hypoactive. Abd is soft. Last reported BM was small and yest.  Sigala is draining stray colored urine. No edema. Skin is intact. Pt has a R PAC. Dressing is clear and intact. 0800  Pt's Dtr Sonia Burciaga called for an update. Rn put the phone up to the Pt's ear so she could tell her Happy Birthday. Dtr tearful but appropriate. 5456  Pt medicated with scheduled IV meds. Pt continues to be restless with stimulization. Pt pull out her Port needle. Needle replaced. Pt turned and repositioned. 0900 Pt resting. 1800 [facility] Alberto Robert in to visit. Rn updated her and provided Mar.  Pt resting. Rn suggest that we may try Lorazepam today for comfort. Alberto Jones is fine with the changes and very happy with the Imagimod for Ms Diana Kapoor. 4606     Dr yu rounding. Medication changes made. Versed Discontinued. Lorazepam started. 1050  Lorazepam given. Pt moving about in bed. 1115  Pt relaxed. Resting. Fentanyl syringe changed. Verified with SE RN.    6958  Pt medicated with scheduled  IV meds. Pt very relaxed. Pt turned and repositioned. No agitation noted. NO grimacing noted. Pt grimaced  And moved her arms about. Pt pulled out her PAC needle. Needle replaced. Pt medicated with other scheduled IV meds. 1330  Pt relaxed. 1400  Pt resting. NO  grimacing  No pulling at clothes, sheets of PCA needle. 1510  Pt resting. 1600  Pt mediated with scheduled IV meds. Pt relaxed. Pt getting lotion on her legs. No facial grimacing or moaning. 5  Pt resting  Pt 's brother at the bedside. RN updated him on pt's status. Brother very thankful that pt is comfortable. 1800  Pt resting. 1900  Pt resting  No grimacing. Respirations shallow.   Report given.        NAME OF PATIENT:  Medina Gómez    LEVEL OF CARE:  GIP     REASON FOR GIP:   Pain, despite numerous changes in medications, Nausea and vomiting, despite changes to medications, Terminal agitation, despite changes to medications, Medication adjustment that must be monitored 24/7, and Stabilizing treatment that cannot take place at home     *PATIENT REMAINS ELIGIBLE FOR [facility] LEVEL OF CARE AS EVIDENCED BY: (MUST BE ADDRESSED OF PATIENT GIP)        REASON FOR RESPITE:  n/a     O2 SAFETY:  RA     FALL INTERVENTIONS PROVIDED:   Implemented/recommended resources for alarm system (personal alarm, bed alarm, call bell, etc.)      INTERDISPLINARY COMMUNICATION/COLLABORATION:  Physician, MSW, Mike, and RN, CNA     NEW MEDICATION INITIATION DOCUMENTATION:  NO new meds initiated. Reason medication is being initiated:  n/a     MD / Provider name consulted re: change in status / initiation of new medication:  n/a     New Symptom(s):  n/a     New Order(s):  n/a     Name of the person notified of the changes:  n/a     Name of person being taught:  n/a     Instructions given:  n/a     Side Effects taught:  n/a     Response to teaching:  n/a        COMFORTABLE DYING MEASURE:  Is Patient/family satisfied with symptom level?  yes     DISCHARGE PLAN:  pt will remain at the [facility] for EOL care.   If she should stabilize, Sw and family will look at alternative living arrangements

## 2023-03-23 NOTE — PROGRESS NOTES
[facility] Help to Those in Need  [phone]    Patient Name: Francisco Rodriguez  YOB: 1955    Date of Provider Hospice Visit: 03/23/23    Level of Care:   [x] General Inpatient (GIP)    [] Routine   [] Respite    Current Location of Care:  [] [address] [place] [address] [] [facility] [] [address] [] [address] [x] [facility] [place] [facility], patient referred from:  [] [address] [place] [address] [] [facility] [] [address] [] [address] [] Home [x] Other:     Date of Original Hospice Admission: 3/5/2023  Hospice Medical Director at time of admission: Dr. Estefania Ornelas Diagnosis: Stage IV ovarian cancer  Diagnoses RELATED to the terminal prognosis: nausea, vomiting, constipation, abdominal pain, carcinomatosis, hx TIA 2018  Other Diagnoses: depression, hypothyroidism      HOSPICE SUMMARY   Do not cut and paste chart information other than imaging findings    Francisco Rodriguez is a 80y.o. year old who was admitted to [facility]. She has extensive history of ovarian cancer treatments beginning in January of 2020. S/p open debulking on 4/28/2020, long term chemo. Last given in July 2020 Patient had a CT scan 4/17/21 with multiple intraperitoneal nodules new from previous scan. The patient's principle diagnosis has resulted in ongoing abdominal pain, now with nausea that is difficult to control. She has been vomiting with intake of food or pills, only keeping down sips of water and tea this week. Refer to LCD     Functionally, the patient's Karnofsky and/or Palliative Performance Scale has declined over a period of months and is estimated at 40. The patient is dependent on the following ADLs:  She requires assistance for bathing    Objective information that support this patients limited prognosis includes: progression of disease by CT scan, unable to tolerate more chemo. Decline in PO intake, dysgeusia, anorexia, ongoing nausea. , uncontrolled abdominal pain    The patient/family chose comfort measures with the support of Hospice. HOSPICE DIAGNOSES   Active Symptoms:  1. Nausea and vomiting.  - not improved with home regimen  2. Constipation, difficulty managing at home  3. Cancer related abdominal pain, and unable to keep down liquid morphine at home  4. Stage IV ovarian cancer with peritoneal carcinomatosis  5. Agitation/delirium     PLAN   Patient will continue [facility] level care as patient needs frequent nursing assessments, IV medication management-not tolerating anything p.o. secondary to essentially bowel obstruction/dysfunction. Very challenging situation as patient despite multiple medications adjustments, additions, continues to show evidence of terminal delirium/restlessness. Difficult to really change her opioid/rotated given her amount that she is requiring as well as had significant nausea and vomiting on Dilaudid. Pain management-continue fentanyl 100 mcg/h and 80 mcg every 15 minutes as needed for breakthrough. Once again, may consider other medications such as ketamine and/or even methadone IV especially if I think the opioids are contributing to may be this neuro excitability/restlessness  Nausea and vomiting-IV medications for this have been held as she has not been eating and drinking  Anxiety/delirium-we are going to try to rotate her benzodiazepine and Ativan 2 mg IV every 4, stop Versed, continue Haldol 5 mg IV every 4, continue phenobarbital 130 Q8. She did not have an allergy to Ativan but supposedly maybe had nightmares. I would like to change to the Ativan as it last longer and see if we can have better benefit. Continue to monitor this closely  Plan will be reviewed with [facility] team  Plan reviewed with bedside nursing team.  No family at bedside this morning but we have been updating them   and SW to support family needs  Disposition: anticipate ongoing decline, and death in the [facility].     Hospice Plan of care was reviewed in detail and agree with current plan of care    Discussed with bedside nurse and also with [facility] hospice RN  Prognosis estimated based on 03/23/23 clinical assessment is:   [x] Hours to Days    [] Days to Weeks    [] Other:    Communicated plan of care with: Hospice Case Manager; Hospice IDT; Care Team     GOALS OF CARE     Patient/Medical POA stated Goal of Care: goal of care is comfort     [x] I have reviewed and/or updated ACP information in the Advance Care Planning Navigator. This information is available in the [address] link in the patient's chart header. Primary Decision [facility] Agent):   Primary Decision Maker: Amol James Daughter - [phone]    Secondary Decision Maker: Joie James Brother - [phone]    Resuscitation Status: DNR  If DNR is there a Durable DNR on file? : [x] Yes [] No (If no, complete Durable DNR)    HISTORY     History obtained from: patient, hospice nurse    CHIEF COMPLAINT: nausea, dysgeusia, vomiting, poor PO intake, constipation  The patient is:   [x] Verbal  [] Nonverbal  [] Unresponsive    HPI/SUBJECTIVE:    78year old female at home supported by [employer]. She has about one week of difficult to control symptoms. Fentanyl patch and liquid morphine for pain has not been effective as she is not able to keep the morphine down. The patch was changed to Q48 hrs when it was noted that the 3rd day was when she had more pain. She has been using enemas and senokot (2 tabs BID) but still has not had a BM in the past 5 days (since Tuesday)  She is vomiting up meds or food but is able to keep down sips of water and tea. Pain is 4/10 right now across her abdomen, it is worse with ambulation  No real change in distention, some slight bloating since summertime    3/6-continues to describe diffuse pain in her abdomen. Even with just palpation of my stethoscope cause discomfort.   Continuing not eating or drinking well    3/7-patient continues to have significant pain in her abdomen. Feels like her abdomen is more distended. 3/8-patient anxious but feels like pain is better managed overall. Still with nausea    3/9-patient appears more comfortable this morning. A little tearful at times. Nausea and pain seems somewhat improved    3/10-patient appears much more comfortable this morning. She actually was able to have a little bit of a smile. Still only tolerating ice chips. 3/11- patient drowsy but wakes from sleep, slowed processing evident, irritable today, major shift in personality per RN    3/13-patient arouses easily. Definitely struggling with processing and coming up with her words. A little bit more irritable today than when I saw her on Friday. 3/14-patient appears little more ashen. A little slow to respond. Definitely getting weaker. Discussed the possibility of a Sigala catheter and she will think about it    3/15-patient very anxious and tearful this morning. 3/16-patient sleeping multiple times when I checked on her    3/17-patient is resting and appears much more comfortable. I think this is actually the best she has looked in several days and she actually looks at peace. 3/18/23: patient aroused said \": hi baby ' to his brother at bed side, some restlessness noted . Per bed side Rn patient requires 4 doses of fentanyl bolus in last 4 hrs and two doses of haldol prn today . 3/20-patient much more restless. Clearly the Versed is not lasting long enough but it is effective when she takes it. Adjustments have been made    3/21-patient significantly more restless earlier today but with adjustment in her medication, addition of Haldol, and adjustment in her needle to the port, patient does appear more comfortable    3/22-appears to be resting right now but reviewed nurses notes on her restlessness    3/23-she is finally resting this morning but once again has needed multiple doses of medication overnight.   6 bolus doses of fentanyl       REVIEW OF SYSTEMS     The following systems were: [x] reviewed  [] unable to be reviewed    Positive ROS include:  Constitutional: fatigue, weakness, in pain especially with movement  Ears/nose/mouth/throat:   Respiratory:  Gastrointestinal:poor appetite, nausea, vomiting, abdominal pain, constipation - last BM Tuesday  Everything tastes horrible  Musculoskeletal:pain,   Neurologic:poor sleep, not more than 1-2 hours per night      Adult Non-Verbal Pain Assessment Score: 2    Face  [] 0   No particular expression or smile  [x] 1   Occasional grimace, tearing, frowning, wrinkled forehead  [] 2   Frequent grimace, tearing, frowning, wrinkled forehead    Activity (movement)  [] 0   Lying quietly, normal position  [x] 1   Seeking attention through movement or slow, cautious movement  [] 2   Restless, excessive activity and/or withdrawal reflexes    Guarding  [x] 0   Lying quietly, no positioning of hands over areas of body  [] 1   Splinting areas of the body, tense  [] 2   Rigid, stiff    Physiology (vital signs)  [] 0   Stable vital signs  [x] 1   Change in any of the following: SBP > 20mm Hg; HR > 20/minute  [] 2   Change in any of the following: SBP > 30mm Hg; HR > 25/minute    Respiratory  [x] 0   Baseline RR/SpO2, compliant with ventilator  [] 1   RR > 10 above baseline, or 5% drop SpO2, mild asynchrony with ventilator  [] 2   RR > 20 above baseline, or 10% drop SpO2, asynchrony with ventilator     FUNCTIONAL ASSESSMENT     Palliative Performance Scale (PPS):20       PSYCHOSOCIAL/SPIRITUAL ASSESSMENT     Active Problems:    Ovarian cancer ([facility] [address] (3/5/2023)    Past Medical History:   Diagnosis Date    Anxiety     Hypotension     Thyroid disease       No past surgical history on file. Social History     Tobacco Use    Smoking status: Not on file    Smokeless tobacco: Not on file   Substance Use Topics    Alcohol use: Not on file     No family history on file.    Allergies   Allergen Reactions    Hydromorphone Nausea and Vomiting    Sulfa (Sulfonamide Antibiotics) Diarrhea and Nausea and Vomiting      Current Facility-Administered Medications   Medication Dose Route Frequency    LORazepam (ATIVAN) injection 2 mg  2 mg IntraVENous Q4H    LORazepam (ATIVAN) injection 2 mg  2 mg IntraVENous Q15MIN PRN    haloperidol lactate (HALDOL) injection 5 mg  5 mg IntraVENous Q4H    PHENobarbital (LUMINAL) injection 130 mg  130 mg IntraVENous TID    ondansetron (ZOFRAN) injection 4 mg  4 mg IntraVENous Q6H PRN    fentaNYL (PF) 1,500 mcg/30 mL PCA   IntraVENous TITRATE    bisacodyL (DULCOLAX) suppository 10 mg  10 mg Rectal DAILY PRN    glycopyrrolate (ROBINUL) injection 0.2 mg  0.2 mg IntraVENous Q4H PRN        PHYSICAL EXAM     Wt Readings from Last 3 Encounters:   12/08/22 66.7 kg (147 lb)   01/04/18 63.5 kg (140 lb)       Visit Vitals  /72   Pulse 85   Temp (!) 96 °F (35.6 °C)   Resp 14   SpO2 91%       Supplemental O2  [] Yes  [x] NO  Last bowel movement: 2 weeks ago    Currently this patient has:  [] Peripheral IV [] PICC  [x] PORT [] ICD    [] Sigala Catheter [] NG Tube   [] PEG Tube    [] Rectal Tube [] Drain  [] Other:     Constitutional: resting right now, no grimacing but very restless earlier, she still has been turning in the bed, try to get out of the bed  Eyes: anicteric, EOMI  ENMT: slightly dry oral mucosa  Cardiovascular: reg  Respiratory: clear anteriorly  Gastrointestinal: abdomen is distended, firm, hypoactive bowel sounds, no grimacing with palpation today  Musculoskeletal:nl  Skin:intact  Neurologic: Sleeping, arousable   Psychiatric: Restless and agitated when she arouses    Pertinent Lab and or Imaging Tests:  Lab Results   Component Value Date/Time    Sodium 140 01/03/2018 11:59 AM    Potassium 3.7 01/03/2018 11:59 AM    Chloride 105 01/03/2018 11:59 AM    CO2 28 01/03/2018 11:59 AM    Anion gap 7 01/03/2018 11:59 AM    Glucose 96 01/03/2018 11:59 AM    BUN 15 01/03/2018 11:59 AM    Creatinine 0.80 01/03/2018 11:59 AM    BUN/Creatinine ratio 19 01/03/2018 11:59 AM    GFR est AA >60 01/03/2018 11:59 AM    GFR est non-AA >60 01/03/2018 11:59 AM    Calcium 8.9 01/03/2018 11:59 AM     Lab Results   Component Value Date/Time    Protein, total 7.6 01/03/2018 11:59 AM    Albumin 3.9 01/03/2018 11:59 AM           Total time:   Counseling / coordination time:   > 50% counseling / coordination?:

## 2023-03-24 NOTE — PROGRESS NOTES
[facility] Help to Those in Need  [phone]    Patient Name: Lori Dugan  YOB: 1955    Date of Provider Hospice Visit: 03/24/23    Level of Care:   [x] General Inpatient (GIP)    [] Routine   [] Respite    Current Location of Care:  [] [facility] [] [facility] [] [id number] [] [facility] [x] Hospice House [facility], patient referred from:  [] [facility] [] [facility] [] [id number] [facility] [] [facility] [] Home [x] Other:     Date of Original Hospice Admission: 3/5/2023  Hospice Medical Director at time of admission: Dr. Mandy Lozano Diagnosis: Stage IV ovarian cancer  Diagnoses RELATED to the terminal prognosis: nausea, vomiting, constipation, abdominal pain, carcinomatosis, hx TIA 2018  Other Diagnoses: depression, hypothyroidism      HOSPICE SUMMARY   Do not cut and paste chart information other than imaging findings    Lori Dugan is a 80y.o. year old who was admitted to [facility]. She has extensive history of ovarian cancer treatments beginning in January of 2020. S/p open debulking on 4/28/2020, long term chemo. Last given in July 2020 Patient had a CT scan 4/17/21 with multiple intraperitoneal nodules new from previous scan. The patient's principle diagnosis has resulted in ongoing abdominal pain, now with nausea that is difficult to control. She has been vomiting with intake of food or pills, only keeping down sips of water and tea this week. Refer to LCD     Functionally, the patient's Karnofsky and/or Palliative Performance Scale has declined over a period of months and is estimated at 40. The patient is dependent on the following ADLs:  She requires assistance for bathing    Objective information that support this patients limited prognosis includes: progression of disease by CT scan, unable to tolerate more chemo. Decline in PO intake, dysgeusia, anorexia, ongoing nausea. , uncontrolled abdominal pain    The patient/family chose comfort measures with the support of Hospice. HOSPICE DIAGNOSES   Active Symptoms:  1. Nausea and vomiting.  - not improved with home regimen  2. Constipation, difficulty managing at home  3. Cancer related abdominal pain, and unable to keep down liquid morphine at home  4. Stage IV ovarian cancer with peritoneal carcinomatosis  5. Agitation/delirium     PLAN   Patient will continue [facility] level care as patient needs frequent nursing assessments, IV medication management-not tolerating anything p.o. secondary to essentially bowel obstruction/dysfunction. Patient finally appears calm after we adjusted Versed to Ativan. She had described episodes of nightmares with Ativan but clearly Versed was not helping. She has tolerated the Ativan well and this is the most comfortable we have seen her in days. No as needed doses of fentanyl overnight  Pain management-continue fentanyl 100 mcg/h and 80 mcg every 15 minutes as needed for breakthrough. No change in dosing  Nausea and vomiting-IV medications for this have been held as she has not been eating and drinking  Anxiety/delirium-continue phenobarbital 130 mg every 8 hours, Ativan 2 mg IV every 4 hours, Haldol 5 mg IV every 4 hours. This combination appears have been effective at this point. Plan will be reviewed with [facility] hospice team  Plan reviewed with bedside nursing team.  No family at bedside this morning but we have been updating them   and SW to support family needs  Disposition: anticipate ongoing decline, and death in the [address] Hospice Plan of care was reviewed in detail and agree with current plan of care    Discussed with bedside nurse and also with [facility] hospice RN  Prognosis estimated based on 03/24/23 clinical assessment is:   [x] Hours to Days    [] Days to Weeks    [] Other:    Communicated plan of care with: Hospice Case Manager;  Hospice IDT; Care Team     GOALS OF CARE     Patient/Medical POA stated Goal of Care: goal of care is comfort     [x] I have reviewed and/or updated ACP information in the Advance Care Planning Navigator. This information is available in the [address] link in the patient's chart header. Primary Decision [facility] Agent):   Primary Decision Maker: Godfrey Cortes - Daughter - [phone]    Secondary Decision Maker: Cyndee Sagastume - Brother - [phone]    Resuscitation Status: DNR  If DNR is there a Durable DNR on file? : [x] Yes [] No (If no, complete Durable DNR)    HISTORY     History obtained from: patient, hospice nurse    CHIEF COMPLAINT: nausea, dysgeusia, vomiting, poor PO intake, constipation  The patient is:   [x] Verbal  [] Nonverbal  [] Unresponsive    HPI/SUBJECTIVE:    78year old female at home supported by [employer]. She has about one week of difficult to control symptoms. Fentanyl patch and liquid morphine for pain has not been effective as she is not able to keep the morphine down. The patch was changed to Q48 hrs when it was noted that the 3rd day was when she had more pain. She has been using enemas and senokot (2 tabs BID) but still has not had a BM in the past 5 days (since Tuesday)  She is vomiting up meds or food but is able to keep down sips of water and tea. Pain is 4/10 right now across her abdomen, it is worse with ambulation  No real change in distention, some slight bloating since summertime    3/6-continues to describe diffuse pain in her abdomen. Even with just palpation of my stethoscope cause discomfort. Continuing not eating or drinking well    3/7-patient continues to have significant pain in her abdomen. Feels like her abdomen is more distended. 3/8-patient anxious but feels like pain is better managed overall. Still with nausea    3/9-patient appears more comfortable this morning. A little tearful at times. Nausea and pain seems somewhat improved    3/10-patient appears much more comfortable this morning. She actually was able to have a little bit of a smile. Still only tolerating ice chips. 3/11- patient drowsy but wakes from sleep, slowed processing evident, irritable today, major shift in personality per RN    3/13-patient arouses easily. Definitely struggling with processing and coming up with her words. A little bit more irritable today than when I saw her on Friday. 3/14-patient appears little more ashen. A little slow to respond. Definitely getting weaker. Discussed the possibility of a Sigala catheter and she will think about it    3/15-patient very anxious and tearful this morning. 3/16-patient sleeping multiple times when I checked on her    3/17-patient is resting and appears much more comfortable. I think this is actually the best she has looked in several days and she actually looks at peace. 3/18/23: patient aroused said \": hi baby ' to his brother at bed side, some restlessness noted . Per bed side Rn patient requires 4 doses of fentanyl bolus in last 4 hrs and two doses of haldol prn today . 3/20-patient much more restless. Clearly the Versed is not lasting long enough but it is effective when she takes it. Adjustments have been made    3/21-patient significantly more restless earlier today but with adjustment in her medication, addition of Haldol, and adjustment in her needle to the port, patient does appear more comfortable    3/22-appears to be resting right now but reviewed nurses notes on her restlessness    3/23-she is finally resting this morning but once again has needed multiple doses of medication overnight. 6 bolus doses of fentanyl    3/24-patient resting, actually appears comfortable.        REVIEW OF SYSTEMS     The following systems were: [x] reviewed  [] unable to be reviewed    Positive ROS include:  Constitutional: fatigue, weakness, in pain especially with movement  Ears/nose/mouth/throat:   Respiratory:  Gastrointestinal:poor appetite, nausea, vomiting, abdominal pain, constipation - last BM Tuesday  Everything tastes horrible  Musculoskeletal:pain,   Neurologic:poor sleep, not more than 1-2 hours per night      Adult Non-Verbal Pain Assessment Score: 2    Face  [] 0   No particular expression or smile  [x] 1   Occasional grimace, tearing, frowning, wrinkled forehead  [] 2   Frequent grimace, tearing, frowning, wrinkled forehead    Activity (movement)  [] 0   Lying quietly, normal position  [x] 1   Seeking attention through movement or slow, cautious movement  [] 2   Restless, excessive activity and/or withdrawal reflexes    Guarding  [x] 0   Lying quietly, no positioning of hands over areas of body  [] 1   Splinting areas of the body, tense  [] 2   Rigid, stiff    Physiology (vital signs)  [] 0   Stable vital signs  [x] 1   Change in any of the following: SBP > 20mm Hg; HR > 20/minute  [] 2   Change in any of the following: SBP > 30mm Hg; HR > 25/minute    Respiratory  [x] 0   Baseline RR/SpO2, compliant with ventilator  [] 1   RR > 10 above baseline, or 5% drop SpO2, mild asynchrony with ventilator  [] 2   RR > 20 above baseline, or 10% drop SpO2, asynchrony with ventilator     FUNCTIONAL ASSESSMENT     Palliative Performance Scale (PPS):20       PSYCHOSOCIAL/SPIRITUAL ASSESSMENT     Active Problems:    Ovarian cancer ([facility] [address] (3/5/2023)    Past Medical History:   Diagnosis Date    Anxiety     Hypotension     Thyroid disease       No past surgical history on file. Social History     Tobacco Use    Smoking status: Not on file    Smokeless tobacco: Not on file   Substance Use Topics    Alcohol use: Not on file     No family history on file.    Allergies   Allergen Reactions    Hydromorphone Nausea and Vomiting    Sulfa (Sulfonamide Antibiotics) Diarrhea and Nausea and Vomiting      Current Facility-Administered Medications   Medication Dose Route Frequency    LORazepam (ATIVAN) injection 2 mg  2 mg IntraVENous Q4H    LORazepam (ATIVAN) injection 2 mg  2 mg IntraVENous Q15MIN PRN    haloperidol lactate (HALDOL) injection 5 mg 5 mg IntraVENous Q4H    PHENobarbital (LUMINAL) injection 130 mg  130 mg IntraVENous TID    ondansetron (ZOFRAN) injection 4 mg  4 mg IntraVENous Q6H PRN    fentaNYL (PF) 1,500 mcg/30 mL PCA   IntraVENous TITRATE    bisacodyL (DULCOLAX) suppository 10 mg  10 mg Rectal DAILY PRN    glycopyrrolate (ROBINUL) injection 0.2 mg  0.2 mg IntraVENous Q4H PRN        PHYSICAL EXAM     Wt Readings from Last 3 Encounters:   12/08/22 66.7 kg (147 lb)   01/04/18 63.5 kg (140 lb)       Visit Vitals  BP (!) 96/54   Pulse 78   Temp 99.1 °F (37.3 °C)   Resp 24   SpO2 (!) 85%       Supplemental O2  [] Yes  [x] NO  Last bowel movement: 2 weeks ago    Currently this patient has:  [] Peripheral IV [] PICC  [x] PORT [] ICD    [] Sigala Catheter [] NG Tube   [] PEG Tube    [] Rectal Tube [] Drain  [] Other:     Constitutional: Resting, calm, no grimacing, no furrowing of the brow  Eyes: anicteric, EOMI  ENMT: slightly dry oral mucosa  Cardiovascular: reg  Respiratory: clear anteriorly  Gastrointestinal: abdomen is distended, firm, hypoactive bowel sounds, no grimacing with palpation today  Musculoskeletal:nl  Skin:intact  Neurologic: Minimally responsive  Psychiatric: Calm    Pertinent Lab and or Imaging Tests:  Lab Results   Component Value Date/Time    Sodium 140 01/03/2018 11:59 AM    Potassium 3.7 01/03/2018 11:59 AM    Chloride 105 01/03/2018 11:59 AM    CO2 28 01/03/2018 11:59 AM    Anion gap 7 01/03/2018 11:59 AM    Glucose 96 01/03/2018 11:59 AM    BUN 15 01/03/2018 11:59 AM    Creatinine 0.80 01/03/2018 11:59 AM    BUN/Creatinine ratio 19 01/03/2018 11:59 AM    GFR est AA >60 01/03/2018 11:59 AM    GFR est non-AA >60 01/03/2018 11:59 AM    Calcium 8.9 01/03/2018 11:59 AM     Lab Results   Component Value Date/Time    Protein, total 7.6 01/03/2018 11:59 AM    Albumin 3.9 01/03/2018 11:59 AM           Total time:   Counseling / coordination time:   > 50% counseling / coordination?:

## 2023-03-24 NOTE — PROGRESS NOTES
Problem: Breathing Pattern - Ineffective  Goal: *Use of effective breathing techniques  Outcome: Not Progressing Towards Goal     Problem: Pain  Goal: *Control of acute pain  Outcome: Not Progressing Towards Goal     Problem: Pressure Injury - Risk of  Goal: *Prevention of pressure injury  Description: Document Markie Scale and appropriate interventions in the flowsheet.   Outcome: Not Progressing Towards Goal  Note: Pressure Injury Interventions:  Sensory Interventions: Assess changes in LOC, Avoid rigorous massage over bony prominences, Check visual cues for pain, Float heels, Keep linens dry and wrinkle-free    Moisture Interventions: Absorbent underpads, Apply protective barrier, creams and emollients    Activity Interventions: Pressure redistribution bed/mattress(bed type)    Mobility Interventions: Float heels, HOB 30 degrees or less    Nutrition Interventions: Document food/fluid/supplement intake, Offer support with meals,snacks and hydration    Friction and Shear Interventions: HOB 30 degrees or less, Lift sheet, Minimize layers

## 2023-03-24 NOTE — PROGRESS NOTES
Problem: Breathing Pattern - Ineffective  Goal: *Use of effective breathing techniques  Outcome: Progressing Towards Goal     Problem: Pain  Goal: *Control of acute pain  Outcome: Progressing Towards Goal     Problem: Pressure Injury - Risk of  Goal: *Prevention of pressure injury  Outcome: Progressing Towards Goal  Note: Pressure Injury Interventions:  Sensory Interventions: Assess changes in LOC, Check visual cues for pain, Keep linens dry and wrinkle-free, Minimize linen layers, Pressure redistribution bed/mattress (bed type)    Moisture Interventions: Absorbent underpads, Internal/External urinary devices, Minimize layers    Activity Interventions: Pressure redistribution bed/mattress(bed type)    Mobility Interventions: Float heels, Pressure redistribution bed/mattress (bed type), HOB 30 degrees or less    Nutrition Interventions: Document food/fluid/supplement intake    Friction and Shear Interventions: HOB 30 degrees or less, Minimize layers       Problem: Grieving  Goal: *Able to express feelings of grief  Outcome: Progressing Towards Goal  Goal: *Able to identify stages of grieving process  Outcome: Progressing Towards Goal     Problem: Mood - Altered  Goal: *Alleviation of anxiety and depressive symptoms  Outcome: Progressing Towards Goal     Problem: Discharge Planning  Goal: *Participates in discharge planning  Outcome: Progressing Towards Goal     Problem: Patient Education: Go to Patient Education Activity  Goal: Patient/Family Education  Outcome: Progressing Towards Goal     Problem: Pressure Injury - Risk of  Goal: *Prevention of pressure injury  Description: Document Markie Scale and appropriate interventions in the flowsheet.   Outcome: Progressing Towards Goal  Note: Pressure Injury Interventions:  Sensory Interventions: Assess changes in LOC, Check visual cues for pain, Keep linens dry and wrinkle-free, Minimize linen layers, Pressure redistribution bed/mattress (bed type)    Moisture Interventions: Absorbent underpads, Internal/External urinary devices, Minimize layers    Activity Interventions: Pressure redistribution bed/mattress(bed type)    Mobility Interventions: Float heels, Pressure redistribution bed/mattress (bed type), HOB 30 degrees or less    Nutrition Interventions: Document food/fluid/supplement intake    Friction and Shear Interventions: HOB 30 degrees or less, Minimize layers         Problem: Patient Education: Go to Patient Education Activity  Goal: Patient/Family Education  Outcome: Progressing Towards Goal     Problem: Falls - Risk of  Goal: *Absence of Falls  Description: Document Balta Fall Risk and appropriate interventions in the flowsheet. Outcome: Progressing Towards Goal  Note: Fall Risk Interventions:  Mobility Interventions: Bed/chair exit alarm    Mentation Interventions: Bed/chair exit alarm, Reorient patient    Medication Interventions: Bed/chair exit alarm    Elimination Interventions: Bed/chair exit alarm          Problem: Patient Education: Go to Patient Education Activity  Goal: Patient/Family Education  Outcome: Progressing Towards Goal     Problem: Infection - Risk of, Central Venous Catheter-Associated Bloodstream Infection  Goal: *Absence of infection signs and symptoms  Outcome: Progressing Towards Goal     Problem: Potential for Alteration in Skin Integrity  Goal: Monitor skin for areas of alteration in skin integrity  Description: Patient/family/caregiver will demonstrate ability to care for patient's skin, monitor for areas of breakdown, and demonstrate methods to prevent breakdown during hospice care. Outcome: Progressing Towards Goal     Problem: Risk for Falls  Goal: Free of falls during inpatient stay  Description: Patient will be free of falls during inpatient stay.   Outcome: Progressing Towards Goal     Problem: Alteration in Mobility  Goal: Remain as independent as possible and remain safe in environment  Description: Patient will remain as independent as possible and remain safe in their environment. Outcome: Progressing Towards Goal     Problem: Pain  Goal: Assess satisfaction of level of comfort and symptom control  Outcome: Progressing Towards Goal  Goal: *Control of acute pain  Outcome: Progressing Towards Goal     Problem: Anxiety/Agitation  Goal: Verbalize or staff assess the ability to manage anxiety  Description: The patient/family/caregiver will verbalize and demonstrate ability to manage the patient's anxiety throughout hospice care. Outcome: Progressing Towards Goal     Problem: Communication Deficit  Goal: Effectively communicate symptoms, needs, and concerns  Description: Patient/family/caregiver will effectively communicate symptoms, needs and concerns. Outcome: Progressing Towards Goal     Problem: Coping and Emotional Distress  Goal: Demonstrate acceptance of terminal illness and understanding of disease progression  Description: Patient/family/caregiver will demonstrate acceptance of terminal disease and understanding of disease progression while employing appropriate coping mechanisms.   Outcome: Progressing Towards Goal     Problem: Pressure Injury - Risk of  Goal: *Prevention of pressure injury  Outcome: Progressing Towards Goal  Note: Pressure Injury Interventions:  Sensory Interventions: Assess changes in LOC, Check visual cues for pain, Keep linens dry and wrinkle-free, Minimize linen layers, Pressure redistribution bed/mattress (bed type)    Moisture Interventions: Absorbent underpads, Internal/External urinary devices, Minimize layers    Activity Interventions: Pressure redistribution bed/mattress(bed type)    Mobility Interventions: Float heels, Pressure redistribution bed/mattress (bed type), HOB 30 degrees or less    Nutrition Interventions: Document food/fluid/supplement intake    Friction and Shear Interventions: HOB 30 degrees or less, Minimize layers          Problem: Nausea/Vomiting (Adult)  Goal: *Absence of nausea/vomiting  Outcome: Progressing Towards Goal     Problem: Infection - Risk of, Central Venous Catheter-Associated Bloodstream Infection  Goal: *Absence of infection signs and symptoms  Outcome: Progressing Towards Goal     Problem: Infection - Risk of, Urinary Catheter-Associated Urinary Tract Infection  Goal: *Absence of infection signs and symptoms  Outcome: Progressing Towards Goal     Problem: Infection - Risk of, Urinary Catheter-Associated Urinary Tract Infection  Goal: *Absence of infection signs and symptoms  Outcome: Progressing Towards Goal     Problem: Imminent Death  Goal: Collaborate with patient/family/caregiver/interdisciplinary team to minimize and manage end of life symptoms  Outcome: Progressing Towards Goal

## 2023-03-24 NOTE — PROGRESS NOTES
..  0700:Report received from [facility] and [facility] Pt is unresponsive to verbal and tactile stimuli, PCA verified with Brittany Meraz, assessment done in flowsheet. P  07:43:Adm schedule comfort meds, mouth care done . 0900: Adm schedule Phenobarb, resting comfortable. 10:12:Shallow resp noted with periods of apnea Dr Wallace Minus rounds no new orders. 11:30:Pt repositioned for comfort, mouth care done. 12:50:Legaimee RN at bedside, pt resting comfortable. 1400:No restless activity noted comfort and safety maintained. 1500:New bag verified with Milli GILLIS. 1600:46:Adm Schedule comfort meds, brother at bedside, emotional support given. 17:15:Pt resp is shallow with periods of apnea HOB elevated. 18:34:Pt resting comfortable, brothers at bedside, emotional support given. 1900:Report given to Brittany Meraz. NAME OF PATIENT:      LEVEL OF CARE:GIP  REASON FOR GIP: Pain and anxiety     *PATIENT REMAINS ELIGIBLE FOR GIP LEVEL OF CARE AS EVIDENCED BY: Pain anxiety requiring PCA and frequent nursing assessments.         REASON FOR RESPITE:n/a       O2 SAFETY:  Concentrator positioning (6\" from furniture/drapes), Tanks stored in sánchez , No petroleum based products on face while oxygen in use and Oxygen sign on the door     FALL INTERVENTIONS PROVIDED:   Implemented/recommended use of fall risk identification flag to all team members, Implemented/recommended assistive devices and encouraged their use, Implemented/recommended resources for alarm system (personal alarm, bed alarm, call bell, etc.)  and Implemented/recommended environmental changes (remove hazards, lower bed, improve lighting, etc.)     INTERDISPLINARY COMMUNICATION/COLLABORATION:  Physician, MSW, [place] and RN, CNA     [facility] [address] DOCUMENTATION:N/A      Reason medication is being initiated:  N/A     MD / Provider name consulted re: change in status / initiation of new medication:  N/A     New Symptom(s):  N/A     New Order(s): N/A     Name of the person notified of the changes:  N/A     Name of person being taught:  N/A     Instructions given:  N/A     Side Effects taught:  N/A     Response to teaching:  N/A        COMFORTABLE DYING MEASURE:  Is Patient/family satisfied with symptom level?  yes     DISCHARGE PLAN:Pt will remain at [facility] until she passes.

## 2023-03-25 NOTE — PROGRESS NOTES
Problem: Breathing Pattern - Ineffective  Goal: *Use of effective breathing techniques  Outcome: Progressing Towards Goal     Problem: Pain  Goal: *Control of acute pain  Outcome: Progressing Towards Goal     Problem: Pressure Injury - Risk of  Goal: *Prevention of pressure injury  Outcome: Progressing Towards Goal  Note: Pressure Injury Interventions:  Sensory Interventions: Assess changes in LOC, Avoid rigorous massage over bony prominences, Check visual cues for pain, Float heels, Monitor skin under medical devices, Pressure redistribution bed/mattress (bed type), Keep linens dry and wrinkle-free, Minimize linen layers, Pad between skin to skin    Moisture Interventions: Absorbent underpads, Maintain skin hydration (lotion/cream), Minimize layers    Activity Interventions: Pressure redistribution bed/mattress(bed type)    Mobility Interventions: Float heels, Pressure redistribution bed/mattress (bed type)    Nutrition Interventions: Offer support with meals,snacks and hydration    Friction and Shear Interventions: Lift sheet, Apply protective barrier, creams and emollients, Minimize layers, HOB 30 degrees or less, Feet elevated on foot rest                Problem: Grieving  Goal: *Able to express feelings of grief  Outcome: Progressing Towards Goal  Goal: *Able to identify stages of grieving process  Outcome: Progressing Towards Goal     Problem: Mood - Altered  Goal: *Alleviation of anxiety and depressive symptoms  Outcome: Progressing Towards Goal     Problem: Discharge Planning  Goal: *Participates in discharge planning  Outcome: Progressing Towards Goal     Problem: Patient Education: Go to Patient Education Activity  Goal: Patient/Family Education  Outcome: Progressing Towards Goal     Problem: Pressure Injury - Risk of  Goal: *Prevention of pressure injury  Description: Document Markie Scale and appropriate interventions in the flowsheet.   Outcome: Progressing Towards Goal  Note: Pressure Injury Interventions:  Sensory Interventions: Assess changes in LOC, Avoid rigorous massage over bony prominences, Check visual cues for pain, Float heels, Monitor skin under medical devices, Pressure redistribution bed/mattress (bed type), Keep linens dry and wrinkle-free, Minimize linen layers, Pad between skin to skin    Moisture Interventions: Absorbent underpads, Maintain skin hydration (lotion/cream), Minimize layers    Activity Interventions: Pressure redistribution bed/mattress(bed type)    Mobility Interventions: Float heels, Pressure redistribution bed/mattress (bed type)    Nutrition Interventions: Offer support with meals,snacks and hydration    Friction and Shear Interventions: Lift sheet, Apply protective barrier, creams and emollients, Minimize layers, HOB 30 degrees or less, Feet elevated on foot rest                Problem: Patient Education: Go to Patient Education Activity  Goal: Patient/Family Education  Outcome: Progressing Towards Goal     Problem: Falls - Risk of  Goal: *Absence of Falls  Description: Document Balta Fall Risk and appropriate interventions in the flowsheet.   Outcome: Progressing Towards Goal  Note: Fall Risk Interventions:  Mobility Interventions: Bed/chair exit alarm    Mentation Interventions: Bed/chair exit alarm, Reorient patient    Medication Interventions: Bed/chair exit alarm    Elimination Interventions: Bed/chair exit alarm              Problem: Patient Education: Go to Patient Education Activity  Goal: Patient/Family Education  Outcome: Progressing Towards Goal     Problem: Infection - Risk of, Central Venous Catheter-Associated Bloodstream Infection  Goal: *Absence of infection signs and symptoms  Outcome: Progressing Towards Goal     Problem: Potential for Alteration in Skin Integrity  Goal: Monitor skin for areas of alteration in skin integrity  Description: Patient/family/caregiver will demonstrate ability to care for patient's skin, monitor for areas of breakdown, and demonstrate methods to prevent breakdown during hospice care. Outcome: Progressing Towards Goal     Problem: Risk for Falls  Goal: Free of falls during inpatient stay  Description: Patient will be free of falls during inpatient stay. Outcome: Progressing Towards Goal     Problem: Alteration in Mobility  Goal: Remain as independent as possible and remain safe in environment  Description: Patient will remain as independent as possible and remain safe in their environment. Outcome: Progressing Towards Goal     Problem: Pain  Goal: Assess satisfaction of level of comfort and symptom control  Outcome: Progressing Towards Goal  Goal: *Control of acute pain  Outcome: Progressing Towards Goal     Problem: Anxiety/Agitation  Goal: Verbalize or staff assess the ability to manage anxiety  Description: The patient/family/caregiver will verbalize and demonstrate ability to manage the patient's anxiety throughout hospice care. Outcome: Progressing Towards Goal     Problem: Communication Deficit  Goal: Effectively communicate symptoms, needs, and concerns  Description: Patient/family/caregiver will effectively communicate symptoms, needs and concerns. Outcome: Progressing Towards Goal     Problem: Coping and Emotional Distress  Goal: Demonstrate acceptance of terminal illness and understanding of disease progression  Description: Patient/family/caregiver will demonstrate acceptance of terminal disease and understanding of disease progression while employing appropriate coping mechanisms.   Outcome: Progressing Towards Goal     Problem: Pressure Injury - Risk of  Goal: *Prevention of pressure injury  Outcome: Progressing Towards Goal     Problem: Nausea/Vomiting (Adult)  Goal: *Absence of nausea/vomiting  Outcome: Progressing Towards Goal     Problem: Infection - Risk of, Central Venous Catheter-Associated Bloodstream Infection  Goal: *Absence of infection signs and symptoms  Outcome: Progressing Towards Goal     Problem: Infection - Risk of, Urinary Catheter-Associated Urinary Tract Infection  Goal: *Absence of infection signs and symptoms  Outcome: Progressing Towards Goal     Problem: Infection - Risk of, Urinary Catheter-Associated Urinary Tract Infection  Goal: *Absence of infection signs and symptoms  Outcome: Progressing Towards Goal     Problem: Imminent Death  Goal: Collaborate with patient/family/caregiver/interdisciplinary team to minimize and manage end of life symptoms  Outcome: Progressing Towards Goal

## 2023-03-25 NOTE — PROGRESS NOTES
Problem: Breathing Pattern - Ineffective  Goal: *Use of effective breathing techniques  Outcome: Progressing Towards Goal     Problem: Pain  Goal: *Control of acute pain  Outcome: Progressing Towards Goal     Problem: Pressure Injury - Risk of  Goal: *Prevention of pressure injury  Outcome: Progressing Towards Goal  Note: Pressure Injury Interventions:  Sensory Interventions: Assess changes in LOC, Check visual cues for pain, Float heels, Keep linens dry and wrinkle-free, Minimize linen layers, Pressure redistribution bed/mattress (bed type)    Moisture Interventions: Absorbent underpads, Internal/External urinary devices, Minimize layers    Activity Interventions: Pressure redistribution bed/mattress(bed type)    Mobility Interventions: Float heels, HOB 30 degrees or less, Pressure redistribution bed/mattress (bed type)    Nutrition Interventions: Document food/fluid/supplement intake    Friction and Shear Interventions: HOB 30 degrees or less, Minimize layers        Problem: Grieving  Goal: *Able to express feelings of grief  Outcome: Progressing Towards Goal  Goal: *Able to identify stages of grieving process  Outcome: Progressing Towards Goal     Problem: Mood - Altered  Goal: *Alleviation of anxiety and depressive symptoms  Outcome: Progressing Towards Goal     Problem: Discharge Planning  Goal: *Participates in discharge planning  Outcome: Progressing Towards Goal     Problem: Patient Education: Go to Patient Education Activity  Goal: Patient/Family Education  Outcome: Progressing Towards Goal     Problem: Pressure Injury - Risk of  Goal: *Prevention of pressure injury  Description: Document Markie Scale and appropriate interventions in the flowsheet.   Outcome: Progressing Towards Goal  Note: Pressure Injury Interventions:  Sensory Interventions: Assess changes in LOC, Check visual cues for pain, Float heels, Keep linens dry and wrinkle-free, Minimize linen layers, Pressure redistribution bed/mattress (bed type)    Moisture Interventions: Absorbent underpads, Internal/External urinary devices, Minimize layers    Activity Interventions: Pressure redistribution bed/mattress(bed type)    Mobility Interventions: Float heels, HOB 30 degrees or less, Pressure redistribution bed/mattress (bed type)    Nutrition Interventions: Document food/fluid/supplement intake    Friction and Shear Interventions: HOB 30 degrees or less, Minimize layers        Problem: Patient Education: Go to Patient Education Activity  Goal: Patient/Family Education  Outcome: Progressing Towards Goal     Problem: Falls - Risk of  Goal: *Absence of Falls  Description: Document Balta Fall Risk and appropriate interventions in the flowsheet. Outcome: Progressing Towards Goal  Note: Fall Risk Interventions:  Mobility Interventions: Bed/chair exit alarm    Mentation Interventions: Bed/chair exit alarm, Reorient patient    Medication Interventions: Bed/chair exit alarm    Elimination Interventions: Bed/chair exit alarm        Problem: Patient Education: Go to Patient Education Activity  Goal: Patient/Family Education  Outcome: Progressing Towards Goal     Problem: Infection - Risk of, Central Venous Catheter-Associated Bloodstream Infection  Goal: *Absence of infection signs and symptoms  Outcome: Progressing Towards Goal     Problem: Potential for Alteration in Skin Integrity  Goal: Monitor skin for areas of alteration in skin integrity  Description: Patient/family/caregiver will demonstrate ability to care for patient's skin, monitor for areas of breakdown, and demonstrate methods to prevent breakdown during hospice care. Outcome: Progressing Towards Goal     Problem: Risk for Falls  Goal: Free of falls during inpatient stay  Description: Patient will be free of falls during inpatient stay.   Outcome: Progressing Towards Goal     Problem: Alteration in Mobility  Goal: Remain as independent as possible and remain safe in environment  Description: Patient will remain as independent as possible and remain safe in their environment. Outcome: Progressing Towards Goal     Problem: Pain  Goal: Assess satisfaction of level of comfort and symptom control  Outcome: Progressing Towards Goal  Goal: *Control of acute pain  Outcome: Progressing Towards Goal     Problem: Anxiety/Agitation  Goal: Verbalize or staff assess the ability to manage anxiety  Description: The patient/family/caregiver will verbalize and demonstrate ability to manage the patient's anxiety throughout hospice care. Outcome: Progressing Towards Goal     Problem: Communication Deficit  Goal: Effectively communicate symptoms, needs, and concerns  Description: Patient/family/caregiver will effectively communicate symptoms, needs and concerns. Outcome: Progressing Towards Goal     Problem: Coping and Emotional Distress  Goal: Demonstrate acceptance of terminal illness and understanding of disease progression  Description: Patient/family/caregiver will demonstrate acceptance of terminal disease and understanding of disease progression while employing appropriate coping mechanisms.   Outcome: Progressing Towards Goal     Problem: Pressure Injury - Risk of  Goal: *Prevention of pressure injury  Outcome: Progressing Towards Goal     Problem: Nausea/Vomiting (Adult)  Goal: *Absence of nausea/vomiting  Outcome: Progressing Towards Goal     Problem: Infection - Risk of, Central Venous Catheter-Associated Bloodstream Infection  Goal: *Absence of infection signs and symptoms  Outcome: Progressing Towards Goal     Problem: Infection - Risk of, Urinary Catheter-Associated Urinary Tract Infection  Goal: *Absence of infection signs and symptoms  Outcome: Progressing Towards Goal     Problem: Infection - Risk of, Urinary Catheter-Associated Urinary Tract Infection  Goal: *Absence of infection signs and symptoms  Outcome: Progressing Towards Goal     Problem: Imminent Death  Goal: Collaborate with patient/family/caregiver/interdisciplinary team to minimize and manage end of life symptoms  Outcome: Progressing Towards Goal

## 2023-03-25 NOTE — PROGRESS NOTES
[facility]  [phone]    Patient Name: Anette Aguilera  YOB: 1955    Date of Provider Hospice Visit: 03/25/23    Level of Care:   [x] General Inpatient (GIP)    [] Routine   [] Respite    Current Location of Care:  [] [facility] [] [facility] [] [id number] [address] [facility] [] [facility] [x] Hospice House THE [facility], patient referred from:  [] [facility] [] [facility] [] [id number] [address] [facility] [] [facility] [] Home [x] Other:     Date of Original Hospice Admission: 3/5/2023  Hospice Medical Director at time of admission: Dr. Zi Tony Diagnosis: Stage IV ovarian cancer  Diagnoses RELATED to the terminal prognosis: nausea, vomiting, constipation, abdominal pain, carcinomatosis, hx TIA 2018  Other Diagnoses: depression, hypothyroidism      HOSPICE SUMMARY   Do not cut and paste chart information other than imaging findings    Anette Aguilera is a 80y.o. year old who was admitted to [facility]. She has extensive history of ovarian cancer treatments beginning in January of 2020. S/p open debulking on 4/28/2020, long term chemo. Last given in July 2020 Patient had a CT scan 4/17/21 with multiple intraperitoneal nodules new from previous scan. The patient's principle diagnosis has resulted in ongoing abdominal pain, now with nausea that is difficult to control. She has been vomiting with intake of food or pills, only keeping down sips of water and tea this week. Refer to LCD     Functionally, the patient's Karnofsky and/or Palliative Performance Scale has declined over a period of months and is estimated at 40. The patient is dependent on the following ADLs:  She requires assistance for bathing    Objective information that support this patients limited prognosis includes: progression of disease by CT scan, unable to tolerate more chemo. Decline in PO intake, dysgeusia, anorexia, ongoing nausea. , uncontrolled abdominal pain    The patient/family chose comfort measures with the support of Hospice. HOSPICE DIAGNOSES   Active Symptoms:  1. Nausea and vomiting- not improved with home regimen  2. Constipation, difficulty managing at home  3. Cancer related abdominal pain, and unable to keep down liquid morphine at home  4. Stage IV ovarian cancer with peritoneal carcinomatosis  5. Agitation/delirium  6. Fever     PLAN   Patient will continue [facility] level care as patient needs frequent nursing assessments, IV medication management-not tolerating anything p.o. secondary to essentially bowel obstruction/dysfunction. Patient still having evidence of nonverbal signs of pain and required at least 11 as needed doses of fentanyl overnight. We will make further adjustments in her fentanyl. Restlessness does appear better  Pain management-based on her as needed dosing, we will increase her fentanyl infusion at 125 mcg/h basal and 100 mcg every 15 minutes as needed for breakthrough pain and or shortness of breath  Nausea and vomiting-IV medications for this have been held as she has not been eating and drinking, no issues with nausea and vomiting now  Anxiety/delirium-continue phenobarbital 130 mg every 8 hours, Ativan 2 mg IV every 4 hours, Haldol 5 mg IV every 4 hours. Finally this combination has seemed to help. Plan will be reviewed with [facility] hospice team  Plan reviewed with bedside nursing team.  No family at bedside this morning but we have been updating them. Brother called this morning   and SW to support family needs  Disposition: anticipate ongoing decline, and death in the [employer]. Hospice Plan of care was reviewed in detail and agree with current plan of care    Discussed with bedside nurse and also with [facility] hospice RN  Prognosis estimated based on 03/25/23 clinical assessment is:   [x] Hours to Days    [] Days to Weeks    [] Other:    Communicated plan of care with: Hospice Case Manager;  Hospice IDT; Care Team     GOALS OF CARE     Patient/Medical POA stated Goal of Care: goal of care is comfort     [x] I have reviewed and/or updated ACP information in the Advance Care Planning Navigator. This information is available in the [address] link in the patient's chart header. Primary Decision [facility] [place] Agent):   Primary Decision Maker: Delfina Ching - Daughter - [phone]    Secondary Decision Maker: Bebeto Bartholomew - Brother - [phone]    Resuscitation Status: DNR  If DNR is there a Durable DNR on file? : [x] Yes [] No (If no, complete Durable DNR)    HISTORY     History obtained from: patient, hospice nurse    CHIEF COMPLAINT: nausea, dysgeusia, vomiting, poor PO intake, constipation  The patient is:   [x] Verbal  [] Nonverbal  [] Unresponsive    HPI/SUBJECTIVE:    78year old female at home supported by [employer]. She has about one week of difficult to control symptoms. Fentanyl patch and liquid morphine for pain has not been effective as she is not able to keep the morphine down. The patch was changed to Q48 hrs when it was noted that the 3rd day was when she had more pain. She has been using enemas and senokot (2 tabs BID) but still has not had a BM in the past 5 days (since Tuesday)  She is vomiting up meds or food but is able to keep down sips of water and tea. Pain is 4/10 right now across her abdomen, it is worse with ambulation  No real change in distention, some slight bloating since summertime    3/6-continues to describe diffuse pain in her abdomen. Even with just palpation of my stethoscope cause discomfort. Continuing not eating or drinking well    3/7-patient continues to have significant pain in her abdomen. Feels like her abdomen is more distended. 3/8-patient anxious but feels like pain is better managed overall. Still with nausea    3/9-patient appears more comfortable this morning. A little tearful at times. Nausea and pain seems somewhat improved    3/10-patient appears much more comfortable this morning.   She actually was able to have a little bit of a smile. Still only tolerating ice chips. 3/11- patient drowsy but wakes from sleep, slowed processing evident, irritable today, major shift in personality per RN    3/13-patient arouses easily. Definitely struggling with processing and coming up with her words. A little bit more irritable today than when I saw her on Friday. 3/14-patient appears little more ashen. A little slow to respond. Definitely getting weaker. Discussed the possibility of a Sigala catheter and she will think about it    3/15-patient very anxious and tearful this morning. 3/16-patient sleeping multiple times when I checked on her    3/17-patient is resting and appears much more comfortable. I think this is actually the best she has looked in several days and she actually looks at peace. 3/18/23: patient aroused said \": hi baby ' to his brother at bed side, some restlessness noted . Per bed side Rn patient requires 4 doses of fentanyl bolus in last 4 hrs and two doses of haldol prn today . 3/20-patient much more restless. Clearly the Versed is not lasting long enough but it is effective when she takes it. Adjustments have been made    3/21-patient significantly more restless earlier today but with adjustment in her medication, addition of Haldol, and adjustment in her needle to the port, patient does appear more comfortable    3/22-appears to be resting right now but reviewed nurses notes on her restlessness    3/23-she is finally resting this morning but once again has needed multiple doses of medication overnight. 6 bolus doses of fentanyl    3/24-patient resting, actually appears comfortable. 3/25-patient resting this morning.   Does appear comfortable but required multiple as needed doses of her PCA overnight       REVIEW OF SYSTEMS     The following systems were: [x] reviewed  [] unable to be reviewed    Positive ROS include:  Constitutional: fatigue, weakness, in pain especially with movement  Ears/nose/mouth/throat:   Respiratory:  Gastrointestinal:poor appetite, nausea, vomiting, abdominal pain, constipation - last BM Tuesday  Everything tastes horrible  Musculoskeletal:pain,   Neurologic:poor sleep, not more than 1-2 hours per night      Adult Non-Verbal Pain Assessment Score: 2    Face  [] 0   No particular expression or smile  [x] 1   Occasional grimace, tearing, frowning, wrinkled forehead  [] 2   Frequent grimace, tearing, frowning, wrinkled forehead    Activity (movement)  [] 0   Lying quietly, normal position  [x] 1   Seeking attention through movement or slow, cautious movement  [] 2   Restless, excessive activity and/or withdrawal reflexes    Guarding  [x] 0   Lying quietly, no positioning of hands over areas of body  [] 1   Splinting areas of the body, tense  [] 2   Rigid, stiff    Physiology (vital signs)  [] 0   Stable vital signs  [x] 1   Change in any of the following: SBP > 20mm Hg; HR > 20/minute  [] 2   Change in any of the following: SBP > 30mm Hg; HR > 25/minute    Respiratory  [x] 0   Baseline RR/SpO2, compliant with ventilator  [] 1   RR > 10 above baseline, or 5% drop SpO2, mild asynchrony with ventilator  [] 2   RR > 20 above baseline, or 10% drop SpO2, asynchrony with ventilator     FUNCTIONAL ASSESSMENT     Palliative Performance Scale (PPS):20       PSYCHOSOCIAL/SPIRITUAL ASSESSMENT     Active Problems:    Ovarian cancer ([facility] [address] (3/5/2023)    Past Medical History:   Diagnosis Date    Anxiety     Hypotension     Thyroid disease       No past surgical history on file. Social History     Tobacco Use    Smoking status: Not on file    Smokeless tobacco: Not on file   Substance Use Topics    Alcohol use: Not on file     No family history on file.    Allergies   Allergen Reactions    Hydromorphone Nausea and Vomiting    Sulfa (Sulfonamide Antibiotics) Diarrhea and Nausea and Vomiting      Current Facility-Administered Medications   Medication Dose Route Frequency    ketorolac (TORADOL) injection 30 mg  30 mg IntraVENous Q6H PRN    LORazepam (ATIVAN) injection 2 mg  2 mg IntraVENous Q4H    LORazepam (ATIVAN) injection 2 mg  2 mg IntraVENous Q15MIN PRN    haloperidol lactate (HALDOL) injection 5 mg  5 mg IntraVENous Q4H    PHENobarbital (LUMINAL) injection 130 mg  130 mg IntraVENous TID    ondansetron (ZOFRAN) injection 4 mg  4 mg IntraVENous Q6H PRN    fentaNYL (PF) 1,500 mcg/30 mL PCA   IntraVENous TITRATE    bisacodyL (DULCOLAX) suppository 10 mg  10 mg Rectal DAILY PRN    glycopyrrolate (ROBINUL) injection 0.2 mg  0.2 mg IntraVENous Q4H PRN        PHYSICAL EXAM     Wt Readings from Last 3 Encounters:   12/08/22 66.7 kg (147 lb)   01/04/18 63.5 kg (140 lb)       Visit Vitals  BP (!) 93/53   Pulse 87   Temp 99 °F (37.2 °C)   Resp 20   SpO2 (!) 61%       Supplemental O2  [] Yes  [x] NO  Last bowel movement: 2 weeks ago    Currently this patient has:  [] Peripheral IV [] PICC  [x] PORT [] ICD    [] Sigala Catheter [] NG Tube   [] PEG Tube    [] Rectal Tube [] Drain  [] Other:     Constitutional: Resting, calm, no grimacing, no furrowing of the brow, more ashen  Eyes: anicteric, EOMI  ENMT: slightly dry oral mucosa  Cardiovascular: reg  Respiratory: clear anteriorly  Gastrointestinal: abdomen is distended, firm, hypoactive bowel sounds, no grimacing with palpation today  Musculoskeletal:nl  Skin:intact, cool extremities  Neurologic: Minimally responsive  Psychiatric: Calm    Pertinent Lab and or Imaging Tests:  Lab Results   Component Value Date/Time    Sodium 140 01/03/2018 11:59 AM    Potassium 3.7 01/03/2018 11:59 AM    Chloride 105 01/03/2018 11:59 AM    CO2 28 01/03/2018 11:59 AM    Anion gap 7 01/03/2018 11:59 AM    Glucose 96 01/03/2018 11:59 AM    BUN 15 01/03/2018 11:59 AM    Creatinine 0.80 01/03/2018 11:59 AM    BUN/Creatinine ratio 19 01/03/2018 11:59 AM    GFR est AA >60 01/03/2018 11:59 AM    GFR est non-AA >60 01/03/2018 11:59 AM    Calcium 8.9 01/03/2018 11:59 AM     Lab Results   Component Value Date/Time    Protein, total 7.6 01/03/2018 11:59 AM    Albumin 3.9 01/03/2018 11:59 AM           Total time:   Counseling / coordination time:   > 50% counseling / coordination?:

## 2023-03-25 NOTE — PROGRESS NOTES
0700: Received report from Moshe Cantrell, [facility]. Fentanyl pump settings verified with Moshe Cantrell RN. Patient resting with eyes closed, mild secretions audible at the bedside. Neutral facial expression observed. 0745: Patient assessment complete. Documented in flowsheets. Patient not responsive during assessment. Respirations are regular depth and rhythm with audible secretions at the bedside. Facial position is neutral throughout assessment. Bowel sounds are absent. Radial and pedal pulses are palpable. Lips are dusky in color  0800: Scheduled haldol 5mg and lorazepam 2mg IV administered. Patient's respirations labored with audible secretions Lips are dusky. Repositioned patient to her left side, patient's respirations are shallow, unlabored, regular rhythm. Secretions not audible after repositioning. 0800: Patient resting with eyes closed, respirations shallow depth, regular rhythm, not responsive to touch or voice. Scheduled phenobarbitol 130mg IV administered. 0900: Patient is resting quietly with eyes closed, appears sleeping. Neutral facial position observed. Respirations are shallow, unlabored  0945: New fentanyl syringe hung with King Vinnie RN. Patient resting with eyes closed, respirations unlabored, shallow  1030: Rounds with Dr. Viviana Covarrubias. Patient remains GIP level of care. Patient requires IV medications for management of symptoms, medication titration, and frequent nursing assessment. 1110: Patient's respiratory effort appears increased. Fentanyl bolus administered  1135: Fentanyl rate change with King Vinnie RN. Patient remains resting with eyes closed, unresponsive to stimuli. 1240: Patient repositioned with pillows for offloading, oral care performed, patient has thn light yellow oral secretions, Scheduled haldol 5mg IV and lorazepam 2mg IV administered. 1345: Patient is resting quietly with eyes closed, Neutral facial position observed. Respirations are shallow, unlabored.    1430: Patient resting with eyes closed, shallow respirations  1530:  Patient resting with eyes closed, respirations shallow, neutral facial expression, patient unresponsive to voice or touch  1600: Scheduled lorazepam 2mg, phenobarbitol 130mg, and haldol 5mg IV administered. 1700: Patient resting in bed with eyes closed,    1800: Patient resting with eyes closed, respiratory rate increased and with increased effort. PCA button depressed. 1845: Patient's brother Hortensia Almendarez at the bedside, updated on patient's condition. He expressed his gratitude for our care.    1900: Report given to Estelle Viramontes

## 2023-03-25 NOTE — PROGRESS NOTES
1900 Report received from Izabel Albrecht. 1930 Verified PCA at bedside w/ [place], LPN. Pt's brother at bedside, supportive. 1720 Notified Dr. Maeve Thurman of pt's fever 101F, new order obtained for PRN toradol. 2050 Scheduled haldol and ativan administered, see MAR. Administered PRN toradol for fever. 2130 Patient resting in bed, eyes closed, respirations shallow, unlabored. 2230 Scheduled phenobarbital administered, see MAR.  2330 Patient resting in bed, eyes closed, neutral facial expression. 0000 Brief changed and rosalind-care done w/ Emily Stout CNA. Moderate BM noted. Pt turned to L side. 0010 Administered scheduled haldol and ativan, see MAR. PRN robinul given for audible secretions. 0020 New PCA syringe placed, verified at bedside w/ Isabelle Silva RN.  0100 Patient resting in bed, eyes closed, neutral facial expression. 0200 Patient resting in bed, eyes closed. Pt tachypneic, pressed PCA bolus dose. 0300 Patient resting in bed, eyes closed, neutral facial expression. 0400 Patient appears pale. Resting in bed w/ neutral facial expression. 0425 Scheduled haldol and ativan administered, see MAR. PRN robinul administered for audible secretions. PCA dose pressed for dyspnea, RR 28/min. 200mL cloudy, dark yellow urine emptied from adkins. 0910 Patient resting in bed, eyes closed, neutral facial expression. 0850 Patient resting in bed, eyes closed, neutral facial expression. 0700 Report given to oncoming RN.         NAME OF PATIENT:  Alvin Mcduffie    LEVEL OF CARE:  GIP    REASON FOR GIP:   Medication adjustment that must be monitored 24/7 and Stabilizing treatment that cannot take place at home    *PATIENT REMAINS ELIGIBLE FOR GIP LEVEL OF CARE AS EVIDENCED BY: (MUST BE ADDRESSED OF PATIENT GIP) fentanyl PCA needed, requiring scheduled IV medication administration, frequent RN assessment and interventions      REASON FOR RESPITE:  N/A    O2 SAFETY:  N/A    FALL INTERVENTIONS PROVIDED:   Implemented/recommended resources for alarm system (personal alarm, bed alarm, call bell, etc.)  and Implemented/recommended environmental changes (remove hazards, lower bed, improve lighting, etc.)    INTERDISPLINARY COMMUNICATION/COLLABORATION:  Physician, Susy PAREKH, and RN, CNA    NEW MEDICATION INITIATION DOCUMENTATION:  Consulted AT MD to report change in pt status, Obtained Order from Provider for initiation of symptom relief medication /other medication needed, and Documentation completed in Clinical Note in [address] [facility]    Reason medication is being initiated:  fever    MD / Provider name consulted re: change in status / initiation of new medication:  Dr. Reynaldo Galvin Symptom(s):  fever 101F    New Order(s):  toradol 30mg IV q6 hours PRN    Name of the person notified of the changes:   Daniela Parsons    Name of person being taught:  Daniela Parsons    Instructions given:  no    Side Effects taught:  no    Response to teaching:  no evidence of learning      COMFORTABLE DYING MEASURE:  Is Patient/family satisfied with symptom level?  yes    DISCHARGE PLAN:  end of life

## 2023-03-26 NOTE — PROGRESS NOTES
1900: Report received from [address] [facility], [facility] Sherman Garner: Patient resting quietly in bed on right side, unresponsive to voice. Fentanyl PCA pump rate verified with ELIS Vaca. Total does given during 12 hour day shift was 1646 mcg. There were 3 bolus attempts with 3 doses delivered. 1933: New Fentanyl syringe hung with ELIS Vaca. Brother Eladio Morataya present in room. 2008: Patient alone in room, in bed, eyes closed, neutral facial expression. Scheduled haldol and ativan given. Assessment completed, documented. Capillary refill 3 seconds or less in all extremities, all extremities warm to touch. 2100: Patient resting quietly in bed, music playing on television. Note cards taped to patient's bed read to patient by documenting RN. 2200: Patient in bed, eyes closed, unresponsive to voice. Audible secretions heard. Scheduled phenobarbital given and PRN robinul given. 2300: Patient in bed on right side, eyes closed, moaning. Patient repositioned, moaning continues. PCA bolus dose given. 2330: Patient given bed bath, adkins care, rosalind care, lotion applied, gown changed. Patient turned to left side. Patient tolerates fair with moaning. PCA bolus dose given. 2344: Patient in bed on left side, respirations 21 breaths per minute. Scheduled haldol and ativan given. 0040: Patient in bed, eyes closed, neutral facial expression, shallow respirations  0140: Patient resting quietly in bed on left side. 0240: Patient continues resting in bed, eyes closed, shallow respirations. 0340: Patient in bed on left side, eyes closed. 0404: Scheduled IV haldol and ativan given. Patient appears comfortable with neutral facial expression, body relaxed. 0500: Patient turned to right side, tolerates well with no facial grimacing or moaning. 5234: New Fentanyl PCA syringe hung with ELIS Hernández. Patient with audible secretions. 0601: PRN IV robinul given for secretions.    4221: Patient resting quietly on right side, eyes closed, neutral facial expression, body relaxed. CNA in room, vitals taken. 0700: Report given to oncoming RN. NAME OF PATIENT:  Magali Mc    LEVEL OF CARE:  GIP    REASON FOR GIP:   Pain, despite numerous changes in medications, Medication adjustment that must be monitored 24/7, and Stabilizing treatment that cannot take place at home    *PATIENT REMAINS ELIGIBLE FOR GIP LEVEL OF CARE AS EVIDENCED BY: Patient unable to tolerate oral medications with uncontrolled pain requiring IV pain medications with adjustments and frequent nursing assessments that cannot take place in the home setting. REASON FOR RESPITE:  N/A    O2 SAFETY:  N/A    FALL INTERVENTIONS PROVIDED:   Implemented/recommended use of non-skid footwear, Implemented/recommended use of fall risk identification flag to all team members, Implemented/recommended assistive devices and encouraged their use, Implemented/recommended resources for alarm system (personal alarm, bed alarm, call bell, etc.) , Implemented/recommended environmental changes (remove hazards, lower bed, improve lighting, etc.), and Implemented/recommended increased supervision/assistance    INTERDISPLINARY COMMUNICATION/COLLABORATION:  Physician, IGLESIA, Adalid Vaz, and RN, CNA    NEW MEDICATION INITIATION DOCUMENTATION:  N/A    Reason medication is being initiated:  N/A    MD / Provider name consulted re: change in status / initiation of new medication:  N/A    New Symptom(s):  N/A    New Order(s):  N/A    Name of the person notified of the changes:  N/A    Name of person being taught:  N/A    Instructions given:  N/A    Side Effects taught:  N/A    Response to teaching:  N/A      COMFORTABLE DYING MEASURE:  Is Patient/family satisfied with symptom level?  yes    DISCHARGE PLAN:  Patient will likely  at [facility]. Should symptoms stabilize, patient will discharge home with home hospice services.

## 2023-03-26 NOTE — PROGRESS NOTES
[address] [facility] Those in Need  [phone]    Patient Name: Medina Gómez  YOB: 1955    Date of Provider Hospice Visit: 03/26/23    Level of Care:   [x] General Inpatient (GIP)    [] Routine   [] Respite    Current Location of Care:  [] [facility] [] [facility] [] [id number] [facility] [] [facility] [x] Hospice House THE [facility]    IF [facility], patient referred from:  [] [facility] [] [facility] [] [id number] [facility] [] [facility] [] Home [x] Other:     Date of Original Hospice Admission: 3/5/2023  Hospice Medical Director at time of admission: Dr. Diego Crum Diagnosis: Stage IV ovarian cancer  Diagnoses RELATED to the terminal prognosis: nausea, vomiting, constipation, abdominal pain, carcinomatosis, hx TIA 2018  Other Diagnoses: depression, hypothyroidism      HOSPICE SUMMARY   Do not cut and paste chart information other than imaging findings    Medina Gómez is a 80y.o. year old who was admitted to [facility]. She has extensive history of ovarian cancer treatments beginning in January of 2020. S/p open debulking on 4/28/2020, long term chemo. Last given in July 2020 Patient had a CT scan 4/17/21 with multiple intraperitoneal nodules new from previous scan. The patient's principle diagnosis has resulted in ongoing abdominal pain, now with nausea that is difficult to control. She has been vomiting with intake of food or pills, only keeping down sips of water and tea this week. Refer to LCD     Functionally, the patient's Karnofsky and/or Palliative Performance Scale has declined over a period of months and is estimated at 40. The patient is dependent on the following ADLs:  She requires assistance for bathing    Objective information that support this patients limited prognosis includes: progression of disease by CT scan, unable to tolerate more chemo. Decline in PO intake, dysgeusia, anorexia, ongoing nausea. , uncontrolled abdominal pain    The patient/family chose comfort measures with the support of Hospice. HOSPICE DIAGNOSES   Active Symptoms:  1. Nausea and vomiting- not improved with home regimen  2. Constipation, difficulty managing at home  3. Cancer related abdominal pain, and unable to keep down liquid morphine at home  4. Stage IV ovarian cancer with peritoneal carcinomatosis  5. Agitation/delirium  6. Fever     PLAN   Patient will continue [facility] level care as patient needs frequent nursing assessments, IV medication management-not tolerating anything p.o. secondary to essentially bowel obstruction/dysfunction. Patient does seem a little bit better with much less need of her PCA. She appears to be resting this morning when I see her. We adjusted her PCA yesterday  Pain management-continue fentanyl PCA at 125 mcg/h basal and 100 mcg every 15 minutes as needed for breakthrough pain and or shortness of breath  Nausea and vomiting-IV medications for this have been held as she has not been eating and drinking, no issues with nausea and vomiting now  Anxiety/delirium-continue phenobarbital 130 mg every 8 hours, Ativan 2 mg IV every 4 hours, Haldol 5 mg IV every 4 hours. Finally this combination has seemed to help. No as needed doses needed  Plan will be reviewed with [facility] hospice team  Plan reviewed with bedside nursing team.  Spent time talking with patient's daughter at the bedside. Updated her on the plan of care, medication adjustments and how difficult this is to watch her mother go through this. She appreciates the care that patient has received at the hospice house   and SW to support family needs  Disposition: anticipate ongoing decline, and death in the [place] [address].     Hospice Plan of care was reviewed in detail and agree with current plan of care    Discussed with bedside nurse and also with [facility] hospice RN  Prognosis estimated based on 03/26/23 clinical assessment is:   [x] Hours to Days    [] Days to Weeks    [] Other:    Communicated plan of care with: Hospice Case Manager; Hospice IDT; Care Team     GOALS OF CARE     Patient/Medical POA stated Goal of Care: goal of care is comfort     [x] I have reviewed and/or updated ACP information in the Advance Care Planning Navigator. This information is available in the [address] link in the patient's chart header. Primary Decision [facility] Agent):   Primary Decision Maker: Tristin Peterson - Daughter - [phone]    Secondary Decision Maker: Meche Marie - Brother - [phone]    Resuscitation Status: DNR  If DNR is there a Durable DNR on file? : [x] Yes [] No (If no, complete Durable DNR)    HISTORY     History obtained from: patient, hospice nurse    CHIEF COMPLAINT: nausea, dysgeusia, vomiting, poor PO intake, constipation  The patient is:   [x] Verbal  [] Nonverbal  [] Unresponsive    HPI/SUBJECTIVE:    78year old female at home supported by [employer]. She has about one week of difficult to control symptoms. Fentanyl patch and liquid morphine for pain has not been effective as she is not able to keep the morphine down. The patch was changed to Q48 hrs when it was noted that the 3rd day was when she had more pain. She has been using enemas and senokot (2 tabs BID) but still has not had a BM in the past 5 days (since Tuesday)  She is vomiting up meds or food but is able to keep down sips of water and tea. Pain is 4/10 right now across her abdomen, it is worse with ambulation  No real change in distention, some slight bloating since summertime    3/6-continues to describe diffuse pain in her abdomen. Even with just palpation of my stethoscope cause discomfort. Continuing not eating or drinking well    3/7-patient continues to have significant pain in her abdomen. Feels like her abdomen is more distended. 3/8-patient anxious but feels like pain is better managed overall. Still with nausea    3/9-patient appears more comfortable this morning. A little tearful at times.   Nausea and pain seems somewhat improved    3/10-patient appears much more comfortable this morning. She actually was able to have a little bit of a smile. Still only tolerating ice chips. 3/11- patient drowsy but wakes from sleep, slowed processing evident, irritable today, major shift in personality per RN    3/13-patient arouses easily. Definitely struggling with processing and coming up with her words. A little bit more irritable today than when I saw her on Friday. 3/14-patient appears little more ashen. A little slow to respond. Definitely getting weaker. Discussed the possibility of a Sigala catheter and she will think about it    3/15-patient very anxious and tearful this morning. 3/16-patient sleeping multiple times when I checked on her    3/17-patient is resting and appears much more comfortable. I think this is actually the best she has looked in several days and she actually looks at peace. 3/18/23: patient aroused said \": hi baby ' to his brother at bed side, some restlessness noted . Per bed side Rn patient requires 4 doses of fentanyl bolus in last 4 hrs and two doses of haldol prn today . 3/20-patient much more restless. Clearly the Versed is not lasting long enough but it is effective when she takes it. Adjustments have been made    3/21-patient significantly more restless earlier today but with adjustment in her medication, addition of Haldol, and adjustment in her needle to the port, patient does appear more comfortable    3/22-appears to be resting right now but reviewed nurses notes on her restlessness    3/23-she is finally resting this morning but once again has needed multiple doses of medication overnight. 6 bolus doses of fentanyl    3/24-patient resting, actually appears comfortable. 3/25-patient resting this morning.   Does appear comfortable but required multiple as needed doses of her PCA overnight    3/26-patient does appear comfortable this afternoon when I see her less restless, no grimacing       REVIEW OF SYSTEMS     The following systems were: [x] reviewed  [] unable to be reviewed    Positive ROS include:  Constitutional: fatigue, weakness, in pain especially with movement  Ears/nose/mouth/throat:   Respiratory:  Gastrointestinal:poor appetite, nausea, vomiting, abdominal pain, constipation - last BM Tuesday  Everything tastes horrible  Musculoskeletal:pain,   Neurologic:poor sleep, not more than 1-2 hours per night      Adult Non-Verbal Pain Assessment Score: 2    Face  [] 0   No particular expression or smile  [x] 1   Occasional grimace, tearing, frowning, wrinkled forehead  [] 2   Frequent grimace, tearing, frowning, wrinkled forehead    Activity (movement)  [] 0   Lying quietly, normal position  [x] 1   Seeking attention through movement or slow, cautious movement  [] 2   Restless, excessive activity and/or withdrawal reflexes    Guarding  [x] 0   Lying quietly, no positioning of hands over areas of body  [] 1   Splinting areas of the body, tense  [] 2   Rigid, stiff    Physiology (vital signs)  [x] 0   Stable vital signs  [] 1   Change in any of the following: SBP > 20mm Hg; HR > 20/minute  [] 2   Change in any of the following: SBP > 30mm Hg; HR > 25/minute    Respiratory  [x] 0   Baseline RR/SpO2, compliant with ventilator  [] 1   RR > 10 above baseline, or 5% drop SpO2, mild asynchrony with ventilator  [] 2   RR > 20 above baseline, or 10% drop SpO2, asynchrony with ventilator     FUNCTIONAL ASSESSMENT     Palliative Performance Scale (PPS):10       PSYCHOSOCIAL/SPIRITUAL ASSESSMENT     Active Problems:    [facility])    Past Medical History:   Diagnosis Date    Anxiety     Hypotension     Thyroid disease       No past surgical history on file. Social History     Tobacco Use    Smoking status: Not on file    Smokeless tobacco: Not on file   Substance Use Topics    Alcohol use: Not on file     No family history on file.    Allergies   Allergen Reactions    Hydromorphone Nausea and Vomiting    Sulfa (Sulfonamide Antibiotics) Diarrhea and Nausea and Vomiting      Current Facility-Administered Medications   Medication Dose Route Frequency    ketorolac (TORADOL) injection 30 mg  30 mg IntraVENous Q6H PRN    LORazepam (ATIVAN) injection 2 mg  2 mg IntraVENous Q4H    LORazepam (ATIVAN) injection 2 mg  2 mg IntraVENous Q15MIN PRN    haloperidol lactate (HALDOL) injection 5 mg  5 mg IntraVENous Q4H    PHENobarbital (LUMINAL) injection 130 mg  130 mg IntraVENous TID    ondansetron (ZOFRAN) injection 4 mg  4 mg IntraVENous Q6H PRN    fentaNYL (PF) 1,500 mcg/30 mL PCA   IntraVENous TITRATE    bisacodyL (DULCOLAX) suppository 10 mg  10 mg Rectal DAILY PRN    glycopyrrolate (ROBINUL) injection 0.2 mg  0.2 mg IntraVENous Q4H PRN        PHYSICAL EXAM     Wt Readings from Last 3 Encounters:   12/08/22 66.7 kg (147 lb)   01/04/18 63.5 kg (140 lb)       Visit Vitals  BP (!) 80/46   Pulse 78   Temp 98.9 °F (37.2 °C)   Resp 21   SpO2 (!) 83%       Supplemental O2  [] Yes  [x] NO  Last bowel movement: 2 weeks ago    Currently this patient has:  [] Peripheral IV [] PICC  [x] PORT [] ICD    [] Sigala Catheter [] NG Tube   [] PEG Tube    [] Rectal Tube [] Drain  [] Other:     Constitutional: Resting, calm, no grimacing, no furrowing of the brow, very ashen  Eyes: anicteric, EOMI  ENMT: slightly dry oral mucosa  Cardiovascular: reg  Respiratory: clear anteriorly  Gastrointestinal: abdomen is distended, firm, hypoactive bowel sounds, no grimacing with palpation today  Musculoskeletal:nl  Skin:intact, cool extremities  Neurologic: Minimally responsive  Psychiatric: Calm    Pertinent Lab and or Imaging Tests:  Lab Results   Component Value Date/Time    Sodium 140 01/03/2018 11:59 AM    Potassium 3.7 01/03/2018 11:59 AM    Chloride 105 01/03/2018 11:59 AM    CO2 28 01/03/2018 11:59 AM    Anion gap 7 01/03/2018 11:59 AM    Glucose 96 01/03/2018 11:59 AM    BUN 15 01/03/2018 11:59 AM Creatinine 0.80 01/03/2018 11:59 AM    BUN/Creatinine ratio 19 01/03/2018 11:59 AM    GFR est AA >60 01/03/2018 11:59 AM    GFR est non-AA >60 01/03/2018 11:59 AM    Calcium 8.9 01/03/2018 11:59 AM     Lab Results   Component Value Date/Time    Protein, total 7.6 01/03/2018 11:59 AM    Albumin 3.9 01/03/2018 11:59 AM           Total time:   Counseling / coordination time:   > 50% counseling / coordination?:

## 2023-03-26 NOTE — PROGRESS NOTES
0700: Report received from  [facility]. Fentanyl PCA pump settings verified with ELIS Mireles  0715:Patient resting in bed with eyes closed, did not rouse to touch or voice during assessment. Patient resting quietly with eyes closed, respiratory secretions are audible  but at low volume Respiratory rate is increased to 24/min. PCA bolus administered  0815: Patient remains resting with eyes closed, respirations are shallow, unlabored. 1660: Scheduled phenobarbitol 130mg IV administered. Patient resting with eyes closed, respirations unlabored. 1000: Patient resting with eyes closed, shallow rapid respirations, Fentanyl bolus administered. 1100: Patient is resting quietly with eyes closed, appears sleeping. Neutral facial position observed. Respirations are shallow, unlabored  1145: Patient resting in bed with eyes closed, family at the bedside,   1220: Scheduled haldol 5mg, lorazepam 2mg administered. Patient repositioned with pillows for offloading. Not responsive during care. 1300: Patient is resting quietly with eyes closed,  Neutral facial position observed. Respirations shallow, unlabored. 1400: Rounds with Dr. Elsy Adamson, patient continues to require IV medications for management of symptoms. Patient is currently resting with eyes closed, shallow respirations. 1445:  fentanyl PCA syringe changed with June Zapata RN. Patient not responsive to voice or touch. 1530:  Patient resting with eyes closed, appears sleeping Respirations are shallow, unlabored,   1600: Patient repositioned for offloading, scheduled haldol 5mg, lorazepam 2mg, and phenobarbitol 135mg administered. 1700: Patient resting quietly with eyes closed, appears sleeping. Neutral facial expression, respirations shallow but increased respiratory rate. PCA bolus administered. 1800: 350ml urine from adkins bag. 183: Patient repositioned, small amount of light yellow secretions in mouth. Oral care performed.    1900: Report given to Ethan Rouse

## 2023-03-26 NOTE — PROGRESS NOTES
Problem: Pain  Goal: *Control of acute pain  Outcome: Progressing Towards Goal     Problem: Pressure Injury - Risk of  Goal: *Prevention of pressure injury  Outcome: Progressing Towards Goal  Note: Pressure Injury Interventions:  Sensory Interventions: Assess changes in LOC, Avoid rigorous massage over bony prominences, Check visual cues for pain, Float heels, Keep linens dry and wrinkle-free, Minimize linen layers, Pad between skin to skin, Pressure redistribution bed/mattress (bed type)    Moisture Interventions: Absorbent underpads, Internal/External urinary devices, Minimize layers, Moisture barrier, Maintain skin hydration (lotion/cream)    Activity Interventions: Pressure redistribution bed/mattress(bed type)    Mobility Interventions: Float heels, HOB 30 degrees or less, Pressure redistribution bed/mattress (bed type)    Nutrition Interventions: Document food/fluid/supplement intake    Friction and Shear Interventions: Apply protective barrier, creams and emollients, HOB 30 degrees or less, Lift sheet, Minimize layers                Problem: Infection - Risk of, Central Venous Catheter-Associated Bloodstream Infection  Goal: *Absence of infection signs and symptoms  Outcome: Progressing Towards Goal     Problem: Pain  Goal: Assess satisfaction of level of comfort and symptom control  Outcome: Progressing Towards Goal  Goal: *Control of acute pain  Outcome: Progressing Towards Goal     Problem: Anxiety/Agitation  Goal: Verbalize or staff assess the ability to manage anxiety  Description: The patient/family/caregiver will verbalize and demonstrate ability to manage the patient's anxiety throughout hospice care.   Outcome: Progressing Towards Goal     Problem: Infection - Risk of, Urinary Catheter-Associated Urinary Tract Infection  Goal: *Absence of infection signs and symptoms  Outcome: Progressing Towards Goal

## 2023-03-26 NOTE — PROGRESS NOTES
1900: Report received from ELIS Cardona.  1842: Patient's brother, Candance Reno, and his spouse Rivas Martin) in room with patient. Patient resting quietly in bed on her right side. Fentanyl PCA rate verified with ELIS Vaca. Total dose during 12 hour day shift: 2054 mcg. Total bolus attempts: 7, total delivered: 6.   1930: Patient's brother Candance Reno and his spouse at bedside. Candance Reno states he removed 3 rings from patient's fingers and is taking them home with him. Informed Candance Reno that documenting RN would note this so everyone would know where the rings are if asked, Candance Reno agrees. 1954: Patient in bed, eyes closed, shallow respirations. Scheduled IV haldol and ativan given. Assessment completed, documented. Patient with increased rate of breathing when abdomen palpated. All extremities warm to touch, all with capillary refill less than or equal to 3 seconds. Course lung sounds. 2045: Patient resting quietly in bed, eyes closed, neutral facial expression, body relaxed. 2145: Patient in bed, eyes closed, shallow respirations. 2233: Patient in bed, unresponsive, eyes closed. Scheduled IV phenobarbital given. 2348: CNA in room, patient receiving bed bath. New fentanyl PCA syringe hung with ELIS Hernández. Scheduled haldol and ativan given. Patient tolerates bath fair, moaning. PCA bolus dose given. 6943: Patient in bed on her left side, eyes closed, shallow respirations. 8010: Patient resting in bed, eyes closed, respirations 22 breaths per minute. PCA bolus given. 0235: Patient in bed, eyes closed, neutral facial expression, body relaxed, shallow respirations. 0335: Patient resting quietly in bed, eyes closed, unresponsive to voice or touch. 0400: Scheduled haldol and ativan given. Patient with dusky/pale skin color. 0500: Patient in bed on left side, shallow respirations, appears comfortable. 0530: Patient turned to right side, does not tolerate well. Increased respiratory rate and moaning with turn.  Does not calm after turn, continues to moan, audible secretions. Bolus PCA dose given. Suction set up in room, small amount of thick secretions removed with suction. 0534: PRN IV ativan and robinul given. 0600: Patient resting quietly in bed on right side, secretions less audible, no moaning, shallow respirations. PRN medications appear to have been effective. 3964: Patient appears comfortable. Sigala emptied, 175 ml out.  0700: Report given to oncoming RN. NAME OF PATIENT:  Albin Melgar    LEVEL OF CARE:  GIP    REASON FOR GIP:   Pain, despite numerous changes in medications, Medication adjustment that must be monitored 24/7, and Stabilizing treatment that cannot take place at home    *PATIENT REMAINS ELIGIBLE FOR GIP LEVEL OF CARE AS EVIDENCED BY:   Patient unable to tolerate oral medications with uncontrolled pain requiring IV pain medications with adjustments and frequent nursing assessments that cannot take place in the home setting.     REASON FOR RESPITE:  N/A    O2 SAFETY:  N/A    FALL INTERVENTIONS PROVIDED:   Implemented/recommended use of non-skid footwear, Implemented/recommended use of fall risk identification flag to all team members, Implemented/recommended assistive devices and encouraged their use, Implemented/recommended resources for alarm system (personal alarm, bed alarm, call bell, etc.) , Implemented/recommended environmental changes (remove hazards, lower bed, improve lighting, etc.), and Implemented/recommended increased supervision/assistance    INTERDISPLINARY COMMUNICATION/COLLABORATION:  Physician, IGLESIA, Maverick Lopez, and RN, CNA    NEW MEDICATION INITIATION DOCUMENTATION:  N/A    Reason medication is being initiated:  N/A    MD / Provider name consulted re: change in status / initiation of new medication:  N/A    New Symptom(s):  N/A    New Order(s):  N/A    Name of the person notified of the changes:  N/A    Name of person being taught:  N/A    Instructions given:  N/A    Side Effects taught: N/A    Response to teaching:  N/A      COMFORTABLE DYING MEASURE:  Is Patient/family satisfied with symptom level?  yes    DISCHARGE PLAN:  Patient will likely  at [facility]. Should symptoms stabilize, patient will discharge home with home hospice services.

## 2023-03-27 NOTE — PROGRESS NOTES
Problem: Pain  Goal: *Control of acute pain  Outcome: Progressing Towards Goal     Problem: Pressure Injury - Risk of  Goal: *Prevention of pressure injury  Outcome: Progressing Towards Goal  Note: Pressure Injury Interventions:  Sensory Interventions: Check visual cues for pain, Float heels, Keep linens dry and wrinkle-free, Minimize linen layers, Pad between skin to skin, Pressure redistribution bed/mattress (bed type)    Moisture Interventions: Absorbent underpads, Internal/External urinary devices, Maintain skin hydration (lotion/cream), Minimize layers, Moisture barrier    Activity Interventions: Pressure redistribution bed/mattress(bed type)    Mobility Interventions: Float heels, HOB 30 degrees or less, Pressure redistribution bed/mattress (bed type)    Nutrition Interventions: Document food/fluid/supplement intake    Friction and Shear Interventions: Apply protective barrier, creams and emollients, HOB 30 degrees or less, Lift sheet, Minimize layers                Problem: Falls - Risk of  Goal: *Absence of Falls  Description: Document Balta Fall Risk and appropriate interventions in the flowsheet.   Outcome: Progressing Towards Goal  Note: Fall Risk Interventions:  Mobility Interventions: Bed/chair exit alarm    Mentation Interventions: Bed/chair exit alarm, Reorient patient    Medication Interventions: Bed/chair exit alarm    Elimination Interventions: Bed/chair exit alarm              Problem: Infection - Risk of, Central Venous Catheter-Associated Bloodstream Infection  Goal: *Absence of infection signs and symptoms  Outcome: Progressing Towards Goal     Problem: Infection - Risk of, Urinary Catheter-Associated Urinary Tract Infection  Goal: *Absence of infection signs and symptoms  Outcome: Progressing Towards Goal

## 2023-03-27 NOTE — PROGRESS NOTES
1900 Report received from Daija Hurt, [address] [place] [address] Dillon PCA verified at bedside w/ Daija Hurt RN. Family at bedside. Pt resting w/ eyes closed, unresponsive, respirations shallow. 2005 Scheduled Haldol and ativan administered, see MAR.  2015 New PCA syringe placed, verified at bedside w/ Radha Sanchez RN.  2100 Patient resting in bed, eyes closed, respirations shallow. 2200 Patient resting in bed, eyes closed, neutral facial expression. 2230 Pt coughing up think yellow secretions. Oral suction done. PRN robinul administered, see MAR. Mouth care completed. Scheduled phenobarbital administered. PCA bolus dose given for tachypnea. 2330 Patient resting in bed, eyes closed, neutral facial expression. 0000 Scheduled haldol and ativan administered, see MAR.  0100 Patient resting in bed, eyes closed, respirations shallow. 0200 Patient resting in bed, eyes closed, neutral facial expression. 0300 PRN robinul given for increased secretions. 9781 New PCA syringe placed, verified at bedside w/ Radha Sanchez RN. Pt repositioned, oral care done. 0400 Patient resting in bed, eyes closed. Audible secretions noted. 3471 Scheduled haldol and ativan administered, see MAR.  0500 Patient resting in bed, eyes closed, neutral facial expression. 0600 Patient resting in bed, eyes closed, respirations shallow, tachypneic. PCA bolus given. 0645 adkins bad drained, 300mL cloudy mona urine. 0700 Report given to oncwes RN.       NAME OF PATIENT:  Liam Blanchard    LEVEL OF CARE:  GIP    REASON FOR GIP:   Medication adjustment that must be monitored 24/7 and Stabilizing treatment that cannot take place at home    *PATIENT REMAINS ELIGIBLE FOR GIP LEVEL OF CARE AS EVIDENCED BY: (MUST BE ADDRESSED OF PATIENT GIP) requiring fentanyl PCA that cannot be administered at home, scheduled IV medication administration, frequent RN assessment and interventions      REASON FOR RESPITE:  N/A    O2 SAFETY:  N/A    FALL INTERVENTIONS PROVIDED:   Implemented/recommended resources for alarm system (personal alarm, bed alarm, call bell, etc.)  and Implemented/recommended environmental changes (remove hazards, lower bed, improve lighting, etc.)    INTERDISPLINARY COMMUNICATION/COLLABORATION:  Physician, IGLESIA, Paige Bradford, and RN, CNA    NEW MEDICATION INITIATION DOCUMENTATION:  N/A    Reason medication is being initiated:  N/A    MD / Provider name consulted re: change in status / initiation of new medication:  N/A    New Symptom(s):  N/A    New Order(s):  N/A    Name of the person notified of the changes:  N/A    Name of person being taught:  N/A    Instructions given:  N/A    Side Effects taught:  N/A    Response to teaching:  N/A      COMFORTABLE DYING MEASURE:  Is Patient/family satisfied with symptom level?  yes    DISCHARGE PLAN:  end of life

## 2023-03-27 NOTE — PROGRESS NOTES
0700  Report received from Aline, [address]  Fentanyl PCA rate verified with [place], RN.  0730  Pt unresponsive, eyes closed, neutral facial expression, unlabored shallow respirations. Coarse lung sounds, warm extremities. 0830  Scheduled medications administered. 0850  Fentanyl PCA syringe changed and new saline bag hung. Pt repositioned for comfort. Rhonchi noted. PCA bolus administered. Thierno Joseph visiting pt. Update and copy of MAR provided per request.    16 Constanza Mejias with Dr. Juanjo Up. No new orders. 1145  Neutral facial expression, unlabored respirations noted. 1245  Scheduled medications administered. 1345  Pt remains unresponsive. No indication of discomfort noted. 1445  Neutral facial expression, unlabored respirations. 1545  Shallow, unlabored respirations, pt resting quietly. 1620  Scheduled medications administered. 1720  Shallow, unlabored respirations, neutral facial expression. 1800  Pt repositioned to right side for comfort. 1900  Report given to oncoming nurse.       NAME OF PATIENT:  [facility]    LEVEL OF CARE:  [facility]    REASON FOR GIP:   Pain, despite numerous changes in medications, Terminal agitation, despite changes to medications, Medication adjustment that must be monitored 24/7, and Stabilizing treatment that cannot take place at home    *PATIENT REMAINS ELIGIBLE FOR [facility] LEVEL OF CARE AS EVIDENCED BY: (MUST BE ADDRESSED OF PATIENT GIP)      REASON FOR RESPITE:  NA    O2 SAFETY:  NA    FALL INTERVENTIONS PROVIDED:   Implemented/recommended use of non-skid footwear, Implemented/recommended use of fall risk identification flag to all team members, Implemented/recommended assistive devices and encouraged their use, Implemented/recommended resources for alarm system (personal alarm, bed alarm, call bell, etc.) , Implemented/recommended environmental changes (remove hazards, lower bed, improve lighting, etc.), and Implemented/recommended increased supervision/assistance    INTERDISPLINARY COMMUNICATION/COLLABORATION:  Physician, IGLESIA, Amy David, and RN, JON    NEW MEDICATION INITIATION DOCUMENTATION:  Consulted AT MD to report change in pt status, Obtained Order from Provider for initiation of symptom relief medication /other medication needed, and Documentation completed in Clinical Note in [address] [facility]    Reason medication is being initiated:  NA    MD / Provider name consulted re: change in status / initiation of new medication:  NA    New Symptom(s):  NA    New Order(s):  NA    Name of the person notified of the changes:  NA    Name of person being taught:  NA    Instructions given:  NA    Side Effects taught:  NA    Response to teaching:  NA    COMFORTABLE DYING MEASURE:  Is Patient/family satisfied with symptom level?  yes    DISCHARGE PLAN:  End of life

## 2023-03-27 NOTE — PROGRESS NOTES
[facility] Help to Those in Need  [phone]    Patient Name: Malena Loving  YOB: 1955    Date of Provider Hospice Visit: 03/27/23    Level of Care:   [x] General Inpatient (GIP)    [] Routine   [] Respite    Current Location of Care:  [] [facility] [] [facility] [] [address] [] [address] [x] Hospice House [facility], patient referred from:  [] [facility] [] [facility] [] [address] [] [address] [] Home [x] Other:     Date of Original Hospice Admission: 3/5/2023  Hospice Medical Director at time of admission: Dr. Carlos Mills Diagnosis: Stage IV ovarian cancer  Diagnoses RELATED to the terminal prognosis: nausea, vomiting, constipation, abdominal pain, carcinomatosis, hx TIA 2018  Other Diagnoses: depression, hypothyroidism      HOSPICE SUMMARY   Do not cut and paste chart information other than imaging findings    Malena Loving is a 80y.o. year old who was admitted to [facility]. She has extensive history of ovarian cancer treatments beginning in January of 2020. S/p open debulking on 4/28/2020, long term chemo. Last given in July 2020 Patient had a CT scan 4/17/21 with multiple intraperitoneal nodules new from previous scan. The patient's principle diagnosis has resulted in ongoing abdominal pain, now with nausea that is difficult to control. She has been vomiting with intake of food or pills, only keeping down sips of water and tea this week. Refer to LCD     Functionally, the patient's Karnofsky and/or Palliative Performance Scale has declined over a period of months and is estimated at 40. The patient is dependent on the following ADLs:  She requires assistance for bathing    Objective information that support this patients limited prognosis includes: progression of disease by CT scan, unable to tolerate more chemo. Decline in PO intake, dysgeusia, anorexia, ongoing nausea. , uncontrolled abdominal pain    The patient/family chose comfort measures with the support of Hospice. HOSPICE DIAGNOSES   Active Symptoms:  1. Nausea and vomiting- not improved with home regimen  2. Constipation, difficulty managing at home  3. Cancer related abdominal pain, and unable to keep down liquid morphine at home  4. Stage IV ovarian cancer with peritoneal carcinomatosis  5. Agitation/delirium  6. Fever     PLAN   Patient will continue [facility] level care as patient needs frequent nursing assessments, IV medication management-not tolerating anything p.o. secondary to essentially bowel obstruction/dysfunction. Patient does seem a little bit better with much less need of her PCA. She appears to be resting this morning when I see her. Multiple adjustments of medication made over the weekend and patient does appear comfortable. Pain management-continue fentanyl PCA at 125 mcg/h basal and 100 mcg every 15 minutes as needed for breakthrough pain and or shortness of breath. Minimal bolus doses needed. Nausea and vomiting-IV medications for this have been held as she has not been eating and drinking, no issues with nausea and vomiting now  Anxiety/delirium-continue phenobarbital 130 mg every 8 hours, Ativan 2 mg IV every 4 hours, Haldol 5 mg IV every 4 hours. 1 as needed dose of Ativan needed in the last 24 hours  Plan will be reviewed with [facility] hospice team  Plan reviewed with bedside nursing team.  No family at the bedside this morning   and SW to support family needs  Disposition: anticipate ongoing decline, and death in the [address] Hospice Plan of care was reviewed in detail and agree with current plan of care    Discussed with bedside nurse and also with [facility] hospice RN  Prognosis estimated based on 03/27/23 clinical assessment is:   [x] Hours to Days    [] Days to Weeks    [] Other:    Communicated plan of care with: Hospice Case Manager;  Hospice IDT; Care Team     GOALS OF CARE     Patient/Medical POA stated Goal of Care: goal of care is comfort     [x] I have reviewed and/or updated ACP information in the Advance Care Planning Navigator. This information is available in the [address] link in the patient's chart header. Primary Decision [facility] Agent):   Primary Decision Maker: Governor Godwin - Daughter - [phone]    Secondary Decision Maker: Seema Desai - Brother - [phone]    Resuscitation Status: DNR  If DNR is there a Durable DNR on file? : [x] Yes [] No (If no, complete Durable DNR)    HISTORY     History obtained from: patient, hospice nurse    CHIEF COMPLAINT: nausea, dysgeusia, vomiting, poor PO intake, constipation  The patient is:   [x] Verbal  [] Nonverbal  [] Unresponsive    HPI/SUBJECTIVE:    78year old female at home supported by [employer]. She has about one week of difficult to control symptoms. Fentanyl patch and liquid morphine for pain has not been effective as she is not able to keep the morphine down. The patch was changed to Q48 hrs when it was noted that the 3rd day was when she had more pain. She has been using enemas and senokot (2 tabs BID) but still has not had a BM in the past 5 days (since Tuesday)  She is vomiting up meds or food but is able to keep down sips of water and tea. Pain is 4/10 right now across her abdomen, it is worse with ambulation  No real change in distention, some slight bloating since summertime    3/6-continues to describe diffuse pain in her abdomen. Even with just palpation of my stethoscope cause discomfort. Continuing not eating or drinking well    3/7-patient continues to have significant pain in her abdomen. Feels like her abdomen is more distended. 3/8-patient anxious but feels like pain is better managed overall. Still with nausea    3/9-patient appears more comfortable this morning. A little tearful at times. Nausea and pain seems somewhat improved    3/10-patient appears much more comfortable this morning. She actually was able to have a little bit of a smile. Still only tolerating ice chips. 3/11- patient drowsy but wakes from sleep, slowed processing evident, irritable today, major shift in personality per RN    3/13-patient arouses easily. Definitely struggling with processing and coming up with her words. A little bit more irritable today than when I saw her on Friday. 3/14-patient appears little more ashen. A little slow to respond. Definitely getting weaker. Discussed the possibility of a Sigala catheter and she will think about it    3/15-patient very anxious and tearful this morning. 3/16-patient sleeping multiple times when I checked on her    3/17-patient is resting and appears much more comfortable. I think this is actually the best she has looked in several days and she actually looks at peace. 3/18/23: patient aroused said \": hi baby ' to his brother at bed side, some restlessness noted . Per bed side Rn patient requires 4 doses of fentanyl bolus in last 4 hrs and two doses of haldol prn today . 3/20-patient much more restless. Clearly the Versed is not lasting long enough but it is effective when she takes it. Adjustments have been made    3/21-patient significantly more restless earlier today but with adjustment in her medication, addition of Haldol, and adjustment in her needle to the port, patient does appear more comfortable    3/22-appears to be resting right now but reviewed nurses notes on her restlessness    3/23-she is finally resting this morning but once again has needed multiple doses of medication overnight. 6 bolus doses of fentanyl    3/24-patient resting, actually appears comfortable. 3/25-patient resting this morning. Does appear comfortable but required multiple as needed doses of her PCA overnight    3/26-patient does appear comfortable this afternoon when I see her less restless, no grimacing    3/27-patient comfortable this morning.   No evidence of restlessness       REVIEW OF SYSTEMS     The following systems were: [x] reviewed  [] unable to be reviewed    Positive ROS include:  Constitutional: fatigue, weakness, in pain especially with movement  Ears/nose/mouth/throat:   Respiratory:  Gastrointestinal:poor appetite, nausea, vomiting, abdominal pain, constipation - last BM Tuesday  Everything tastes horrible  Musculoskeletal:pain,   Neurologic:poor sleep, not more than 1-2 hours per night      Adult Non-Verbal Pain Assessment Score: 2    Face  [] 0   No particular expression or smile  [x] 1   Occasional grimace, tearing, frowning, wrinkled forehead  [] 2   Frequent grimace, tearing, frowning, wrinkled forehead    Activity (movement)  [] 0   Lying quietly, normal position  [x] 1   Seeking attention through movement or slow, cautious movement  [] 2   Restless, excessive activity and/or withdrawal reflexes    Guarding  [x] 0   Lying quietly, no positioning of hands over areas of body  [] 1   Splinting areas of the body, tense  [] 2   Rigid, stiff    Physiology (vital signs)  [x] 0   Stable vital signs  [] 1   Change in any of the following: SBP > 20mm Hg; HR > 20/minute  [] 2   Change in any of the following: SBP > 30mm Hg; HR > 25/minute    Respiratory  [x] 0   Baseline RR/SpO2, compliant with ventilator  [] 1   RR > 10 above baseline, or 5% drop SpO2, mild asynchrony with ventilator  [] 2   RR > 20 above baseline, or 10% drop SpO2, asynchrony with ventilator     FUNCTIONAL ASSESSMENT     Palliative Performance Scale (PPS):10       PSYCHOSOCIAL/SPIRITUAL ASSESSMENT     Active Problems:    Ovarian cancer ([facility] [address] (3/5/2023)    Past Medical History:   Diagnosis Date    Anxiety     Hypotension     Thyroid disease       No past surgical history on file. Social History     Tobacco Use    Smoking status: Not on file    Smokeless tobacco: Not on file   Substance Use Topics    Alcohol use: Not on file     No family history on file.    Allergies   Allergen Reactions    Hydromorphone Nausea and Vomiting    Sulfa (Sulfonamide Antibiotics) Diarrhea and Nausea and Vomiting      Current Facility-Administered Medications   Medication Dose Route Frequency    ketorolac (TORADOL) injection 30 mg  30 mg IntraVENous Q6H PRN    LORazepam (ATIVAN) injection 2 mg  2 mg IntraVENous Q4H    LORazepam (ATIVAN) injection 2 mg  2 mg IntraVENous Q15MIN PRN    haloperidol lactate (HALDOL) injection 5 mg  5 mg IntraVENous Q4H    PHENobarbital (LUMINAL) injection 130 mg  130 mg IntraVENous TID    ondansetron (ZOFRAN) injection 4 mg  4 mg IntraVENous Q6H PRN    fentaNYL (PF) 1,500 mcg/30 mL PCA   IntraVENous TITRATE    bisacodyL (DULCOLAX) suppository 10 mg  10 mg Rectal DAILY PRN    glycopyrrolate (ROBINUL) injection 0.2 mg  0.2 mg IntraVENous Q4H PRN        PHYSICAL EXAM     Wt Readings from Last 3 Encounters:   12/08/22 66.7 kg (147 lb)   01/04/18 63.5 kg (140 lb)       Visit Vitals  BP (!) 77/41   Pulse 67   Temp 97 °F (36.1 °C)   Resp 17   SpO2 (!) 77%       Supplemental O2  [] Yes  [x] NO  Last bowel movement: 2 weeks ago    Currently this patient has:  [] Peripheral IV [] PICC  [x] PORT [] ICD    [] Sigala Catheter [] NG Tube   [] PEG Tube    [] Rectal Tube [] Drain  [] Other:     Constitutional: Resting, calm, no grimacing, no furrowing of the brow, very ashen  Eyes: anicteric, EOMI  ENMT: slightly dry oral mucosa  Cardiovascular: reg  Respiratory: clear anteriorly  Gastrointestinal: abdomen is distended, firm, hypoactive bowel sounds, no grimacing with palpation today  Musculoskeletal:nl  Skin:intact, cool extremities  Neurologic: Minimally responsive  Psychiatric: Calm    Pertinent Lab and or Imaging Tests:  Lab Results   Component Value Date/Time    Sodium 140 01/03/2018 11:59 AM    Potassium 3.7 01/03/2018 11:59 AM    Chloride 105 01/03/2018 11:59 AM    CO2 28 01/03/2018 11:59 AM    Anion gap 7 01/03/2018 11:59 AM    Glucose 96 01/03/2018 11:59 AM    BUN 15 01/03/2018 11:59 AM    Creatinine 0.80 01/03/2018 11:59 AM    BUN/Creatinine ratio 19 01/03/2018 11:59 AM    GFR est AA >60 01/03/2018 11:59 AM    GFR est non-AA >60 01/03/2018 11:59 AM    Calcium 8.9 01/03/2018 11:59 AM     Lab Results   Component Value Date/Time    Protein, total 7.6 01/03/2018 11:59 AM    Albumin 3.9 01/03/2018 11:59 AM           Total time:   Counseling / coordination time:   > 50% counseling / coordination?:

## 2023-03-28 NOTE — PROGRESS NOTES
190: Report received from Haresh, [address]: Patient in bed on right side, brother Shraddha Haddad and another visitor in room. Dilaudid PCA rate verified with ELIS Hutson. Total dose since 12:30 PM when dilaudid was started was 21.63 mg. There were 5 bolus attempts, 4 given. : Scheduled IV ativan and lorazepam given. Assessment completed, documented. Bilateral hand edema, fingertips blue/purple in color, capillary refill greater than 3 seconds in all extremities, all extremities cool to touch. Patient with 32 breaths per minutes, PCA bolus dose administered. Brother Shraddha Haddad spending the night, blankets provided. : Patient resting in bed on right side, two brothers at bedside. : New dilaudid PCA syringe hung with Chioma Jim RN. Extremities remain cool to touch. : Scheduled phenobarbital given. Patient turned to left side, tolerates fair with increased work of breathing. PCA bolus dose given. Skin color dusky/ashen.  2300: Patient in bed on left side, shallow respirations, audible secretions. Brothers state that prior to patient being unresponsive, she stated that she did not want anyone with her when she passed away. Brothers decide to leave for the night. 0005: Scheduled IV robinul and lorazepam given. Patient with shallow breathing, periods of apnea less than 15 seconds. 0100: Patient resting on left side, eyes closed, intermittent audible secretions. 0200: Patient in bed, eyes closed, respirations 16 breaths per minute, neutral facial expression, body relaxed. 0300: Patient resting in bed, eyes closed, shallow respirations with periods of apnea. 0400: Patient in bed, eyes closed. Scheduled IV robinul and lorazepam given. Patient turned to right side. Very shallow, very short respirations. Arms, hands, and legs now mottled, cool to touch. 0410: Documenting RN contacted KHOA, patient's brother Shraddha Haddad to inform him of changes.   1054: Patient , pronounced after two full minutes of absent heart and lung sounds on auscultation. Brother Sweta Alejandra and another brother coming to [facility] to see patient. 18: Family arrives at [facility]. 0520: Family leaves, post-mortem care completed, [employer] [facility] contacted. NAME OF PATIENT:  Lori Dugan    LEVEL OF CARE:  [facility]    REASON FOR GIP:   Pain, despite numerous changes in medications, Medication adjustment that must be monitored 24/7, and Stabilizing treatment that cannot take place at home    *PATIENT REMAINS ELIGIBLE FOR [facility] LEVEL OF CARE AS EVIDENCED BY: Uncontrolled pain requiring IV medications with adjustments and frequent nursing assessments. REASON FOR RESPITE:  N/A    O2 SAFETY:  N/A    FALL INTERVENTIONS PROVIDED:   Implemented/recommended use of non-skid footwear, Implemented/recommended use of fall risk identification flag to all team members, Implemented/recommended assistive devices and encouraged their use, Implemented/recommended resources for alarm system (personal alarm, bed alarm, call bell, etc.) , Implemented/recommended environmental changes (remove hazards, lower bed, improve lighting, etc.), and Implemented/recommended increased supervision/assistance    INTERDISPLINARY COMMUNICATION/COLLABORATION:  Physician, IGLESIA, Gertrude Sanabria, and RNJON    NEW MEDICATION INITIATION DOCUMENTATION:  N/A    Reason medication is being initiated:  N/A    MD / Provider name consulted re: change in status / initiation of new medication:  N/A    New Symptom(s):  N/A    New Order(s):  N/A    Name of the person notified of the changes:  N/A    Name of person being taught:  N/A    Instructions given:  N/A    Side Effects taught:  N/A    Response to teaching:  N/A      COMFORTABLE DYING MEASURE:  Is Patient/family satisfied with symptom level?  yes    DISCHARGE PLAN:  Patient will likely  at [facility].  Should symptoms stabilize, patient will discharge home with home hospice

## 2023-03-28 NOTE — PROGRESS NOTES
Problem: Breathing Pattern - Ineffective  Goal: *Use of effective breathing techniques  Outcome: Progressing Towards Goal     Problem: Pain  Goal: *Control of acute pain  Outcome: Progressing Towards Goal     Problem: Pressure Injury - Risk of  Goal: *Prevention of pressure injury  Outcome: Progressing Towards Goal  Note: Pressure Injury Interventions:  Sensory Interventions: Assess changes in LOC, Check visual cues for pain, Float heels, Keep linens dry and wrinkle-free, Maintain/enhance activity level, Minimize linen layers, Pressure redistribution bed/mattress (bed type), Pad between skin to skin, Suspension boots    Moisture Interventions: Absorbent underpads, Check for incontinence Q2 hours and as needed, Internal/External urinary devices, Maintain skin hydration (lotion/cream), Minimize layers, Moisture barrier    Activity Interventions: Pressure redistribution bed/mattress(bed type)    Mobility Interventions: Float heels, HOB 30 degrees or less, Pressure redistribution bed/mattress (bed type), Turn and reposition approx. every two hours(pillow and wedges)    Nutrition Interventions:  (NPO at EOL)    Friction and Shear Interventions: HOB 30 degrees or less, Minimize layers                Problem: Grieving  Goal: *Able to express feelings of grief  Outcome: Progressing Towards Goal  Goal: *Able to identify stages of grieving process  Outcome: Progressing Towards Goal     Problem: Mood - Altered  Goal: *Alleviation of anxiety and depressive symptoms  Outcome: Progressing Towards Goal     Problem: Discharge Planning  Goal: *Participates in discharge planning  Outcome: Progressing Towards Goal     Problem: Patient Education: Go to Patient Education Activity  Goal: Patient/Family Education  Outcome: Progressing Towards Goal     Problem: Pressure Injury - Risk of  Goal: *Prevention of pressure injury  Description: Document Markie Scale and appropriate interventions in the flowsheet.   Outcome: Progressing Towards Goal  Note: Pressure Injury Interventions:  Sensory Interventions: Assess changes in LOC, Check visual cues for pain, Float heels, Keep linens dry and wrinkle-free, Maintain/enhance activity level, Minimize linen layers, Pressure redistribution bed/mattress (bed type), Pad between skin to skin, Suspension boots    Moisture Interventions: Absorbent underpads, Check for incontinence Q2 hours and as needed, Internal/External urinary devices, Maintain skin hydration (lotion/cream), Minimize layers, Moisture barrier    Activity Interventions: Pressure redistribution bed/mattress(bed type)    Mobility Interventions: Float heels, HOB 30 degrees or less, Pressure redistribution bed/mattress (bed type), Turn and reposition approx. every two hours(pillow and wedges)    Nutrition Interventions:  (NPO at EOL)    Friction and Shear Interventions: HOB 30 degrees or less, Minimize layers                Problem: Patient Education: Go to Patient Education Activity  Goal: Patient/Family Education  Outcome: Progressing Towards Goal     Problem: Falls - Risk of  Goal: *Absence of Falls  Description: Document Cinthia Collier Fall Risk and appropriate interventions in the flowsheet.   Outcome: Progressing Towards Goal  Note: Fall Risk Interventions:  Mobility Interventions: Bed/chair exit alarm    Mentation Interventions: Bed/chair exit alarm, Reorient patient    Medication Interventions: Bed/chair exit alarm    Elimination Interventions: Bed/chair exit alarm              Problem: Patient Education: Go to Patient Education Activity  Goal: Patient/Family Education  Outcome: Progressing Towards Goal     Problem: Infection - Risk of, Central Venous Catheter-Associated Bloodstream Infection  Goal: *Absence of infection signs and symptoms  Outcome: Progressing Towards Goal     Problem: Potential for Alteration in Skin Integrity  Goal: Monitor skin for areas of alteration in skin integrity  Description: Patient/family/caregiver will demonstrate ability to care for patient's skin, monitor for areas of breakdown, and demonstrate methods to prevent breakdown during hospice care. Outcome: Progressing Towards Goal     Problem: Risk for Falls  Goal: Free of falls during inpatient stay  Description: Patient will be free of falls during inpatient stay. Outcome: Progressing Towards Goal     Problem: Alteration in Mobility  Goal: Remain as independent as possible and remain safe in environment  Description: Patient will remain as independent as possible and remain safe in their environment. Outcome: Progressing Towards Goal     Problem: Pain  Goal: Assess satisfaction of level of comfort and symptom control  Outcome: Progressing Towards Goal  Goal: *Control of acute pain  Outcome: Progressing Towards Goal     Problem: Anxiety/Agitation  Goal: Verbalize or staff assess the ability to manage anxiety  Description: The patient/family/caregiver will verbalize and demonstrate ability to manage the patient's anxiety throughout hospice care. Outcome: Progressing Towards Goal     Problem: Communication Deficit  Goal: Effectively communicate symptoms, needs, and concerns  Description: Patient/family/caregiver will effectively communicate symptoms, needs and concerns. Outcome: Progressing Towards Goal     Problem: Coping and Emotional Distress  Goal: Demonstrate acceptance of terminal illness and understanding of disease progression  Description: Patient/family/caregiver will demonstrate acceptance of terminal disease and understanding of disease progression while employing appropriate coping mechanisms.   Outcome: Progressing Towards Goal     Problem: Pressure Injury - Risk of  Goal: *Prevention of pressure injury  Outcome: Progressing Towards Goal     Problem: Nausea/Vomiting (Adult)  Goal: *Absence of nausea/vomiting  Outcome: Progressing Towards Goal     Problem: Infection - Risk of, Central Venous Catheter-Associated Bloodstream Infection  Goal: *Absence of infection signs and symptoms  Outcome: Progressing Towards Goal     Problem: Infection - Risk of, Urinary Catheter-Associated Urinary Tract Infection  Goal: *Absence of infection signs and symptoms  Outcome: Progressing Towards Goal     Problem: Infection - Risk of, Urinary Catheter-Associated Urinary Tract Infection  Goal: *Absence of infection signs and symptoms  Outcome: Progressing Towards Goal     Problem: Imminent Death  Goal: Collaborate with patient/family/caregiver/interdisciplinary team to minimize and manage end of life symptoms  Outcome: Progressing Towards Goal

## 2023-03-28 NOTE — PROGRESS NOTES
0700: report received from Daren Kang 251: fentanyl PCA dual verified with ELIS Kang. Pt had a total of 2732mcg of fentanyl in the previous shift. Pt respirations labored through chest and abdomen, RR 30. PCA button pushed. Full assessment done, see flow sheets. 0081: scheduled lorazepam and haldol given per [facility]. Pt having audible secretions and rapid, shallow respirations. PRN robinul given per MAR. PCA button pushed. 4064: new fentanyl syringe hung and dual verified with ROBYN Yo.   0271: scheduled phenobarbital given per [facility]. Pt continues with rapid respirations and labored through abdomen. 1000: pt resting with eyes closed, respirations labored and rapid. 1005: spoke with pts daughter and updated on pt status and POC. Will update daughter again after rounds. 1110: rounds with Dr Osmar Scanlon. Pt continues with labored respirations and audible secretions. Will schedule robinul q4 hours and change PCA from fentanyl to dilaudid and monitor closely. 1200: sister Lamonte Mar at bedside, updated on pt status and med changes. 1213: scheduled meds given per [facility]   1228: fentanyl PCA stopped and dilaudid PCA started and dual verified with Rosa Leon LPN.   1978: spoke with brother/MPOA Epi Wade and updated on pt status and medication changes. 1300: respirations shallow and irregular, still pretty rapid at 24/min. PCA button pushed. 1310: [facility] RN Sindy Doe here to see patient. 1420: pt resting with eyes closed, intermittent audible secretions. Respirations are shallow and irregular, RR 12 with periods of 5-10 seconds apnea. 1530: pt resting with eyes closed, respirations irregular and shallow. Neutral facial expression. 1635: scheduled meds given per MAR. Dr Osmar Scanlon at bedside for evening rounds. Verbal order to d/c scheduled haldol. 1730: respirations increased to 36-40, and stridorous. Bounding carotid pulse. Nail beds are blue. Extremities still warm.  Spoke with brother Epi Wade and daughter Dawna Challenger and updated on patient status. Hawa Paez is planning to visit [facility] this evening. 1815: respirations continue to be labored and stridorous. PCA button pushed. 1900: report given to oncoming nurse. NAME OF PATIENT:  Phoenix Ventura    LEVEL OF CARE:  GIP    REASON FOR GIP:   Pain, despite numerous changes in medications, Medication adjustment that must be monitored 24/7, and Stabilizing treatment that cannot take place at home    *PATIENT REMAINS ELIGIBLE FOR GIP LEVEL OF CARE AS EVIDENCED BY: (MUST BE ADDRESSED OF PATIENT GIP)  Pt is requiring frequent nursing assessments to monitor symptoms of pain and SOB. Medication adjustments have been needed to manage symptoms. REASON FOR RESPITE:  N/a    O2 SAFETY:  Pt is on room air    FALL INTERVENTIONS PROVIDED:   Implemented/recommended use of non-skid footwear, Implemented/recommended use of fall risk identification flag to all team members, Implemented/recommended assistive devices and encouraged their use, Implemented/recommended resources for alarm system (personal alarm, bed alarm, call bell, etc.) , Implemented/recommended environmental changes (remove hazards, lower bed, improve lighting, etc.), and Implemented/recommended increased supervision/assistance    INTERDISPLINARY COMMUNICATION/COLLABORATION:  Physician, MSW, Mike, and RN, CNA    NEW MEDICATION INITIATION DOCUMENTATION:  Consulted AT MD to report change in pt status, Obtained Order from Provider for initiation of symptom relief medication /other medication needed, and Documentation completed in Clinical Note in [facility]    Reason medication is being initiated:  continued increased WOB despite increasing dosages of fentanyl. MD / Provider name consulted re: change in status / initiation of new medication:  Gokul    New Symptom(s):  continued increased WOB    New Order(s):  d/c fentanyl PCA. Initiate dilaudid PCA 2mg/hr, 2mg bolus q15min PRN, 40mg lockout/4 hours.      Name of the person notified of the changes:  brother Seble Chavez, sister Olvin Rhodes, daughter Dina Hu    Name of person being taught:  Xander Cortes    Instructions given:  yes    Side Effects taught:  yes    Response to teaching:  verbalized understanding      COMFORTABLE DYING MEASURE:  Is Patient/family satisfied with symptom level?  yes    DISCHARGE PLAN:  EOL at [facility].

## 2023-03-28 NOTE — PROGRESS NOTES
[facility] Help to Those in Need  [phone]    Patient Name: Kiera Mandel  YOB: 1955    Date of Provider Hospice Visit: 03/28/23    Level of Care:   [x] General Inpatient (GIP)    [] Routine   [] Respite    Current Location of Care:  [] [facility] [] [facility] [] [facility] [] [facility] [x] Hospice House [facility], patient referred from:  [] [facility] [] [facility] [] [facility] [] [facility] [] Home [x] Other:     Date of Original Hospice Admission: 3/5/2023  Hospice Medical Director at time of admission: Dr. Mel Cavazos Diagnosis: Stage IV ovarian cancer  Diagnoses RELATED to the terminal prognosis: nausea, vomiting, constipation, abdominal pain, carcinomatosis, hx TIA 2018  Other Diagnoses: depression, hypothyroidism      HOSPICE SUMMARY   Do not cut and paste chart information other than imaging findings    Kiera Mandel is a 80y.o. year old who was admitted to [facility]. She has extensive history of ovarian cancer treatments beginning in January of 2020. S/p open debulking on 4/28/2020, long term chemo. Last given in July 2020 Patient had a CT scan 4/17/21 with multiple intraperitoneal nodules new from previous scan. The patient's principle diagnosis has resulted in ongoing abdominal pain, now with nausea that is difficult to control. She has been vomiting with intake of food or pills, only keeping down sips of water and tea this week. Refer to LCD     Functionally, the patient's Karnofsky and/or Palliative Performance Scale has declined over a period of months and is estimated at 40. The patient is dependent on the following ADLs:  She requires assistance for bathing    Objective information that support this patients limited prognosis includes: progression of disease by CT scan, unable to tolerate more chemo. Decline in PO intake, dysgeusia, anorexia, ongoing nausea. , uncontrolled abdominal pain    The patient/family chose comfort measures with the support of Hospice. HOSPICE DIAGNOSES   Active Symptoms:  1. Nausea and vomiting- not improved with home regimen  2. Constipation, difficulty managing at home  3. Cancer related abdominal pain, and unable to keep down liquid morphine at home  4. Stage IV ovarian cancer with peritoneal carcinomatosis  5. Agitation/delirium  6. Fever     PLAN   Patient will continue [facility] level care as patient needs frequent nursing assessments, IV medication management-not tolerating anything p.o. secondary to essentially bowel obstruction/dysfunction. Patient does seem a little bit better with much less need of her PCA. Patient appears to be more restless, short of breath and I have concerns that the fentanyl PCA no longer being effective or may be contributing to this with hyperalgesia. The dosing is becoming much higher which I do not think is managing her symptoms. Since patient is essentially unresponsive, will rotate her opioid to Dilaudid to see if we can manage both pain and shortness of breath better. Pain management-based on her current fentanyl dosing of 125 mcg/h-this is approximately 12 mg/h morphine which would equal approximately 2.5 mg/h Dilaudid but since incomplete cross tolerance, we will lower the dose to 2 mg/h basal Dilaudid and 2 mg every 15 minutes. Hoping this provides better symptom management. Anxiety/delirium-continue phenobarbital 130 mg every 8 hours, Ativan 2 mg IV every 4 hours, Haldol 5 mg IV every 4 hours. I am hoping the rotation of the opioid will help with the symptoms as well. Plan will be reviewed with [facility] hospice team  Plan reviewed with bedside nursing team.  No family at the bedside this morning. Daughter checked in this morning   and SW to support family needs  Disposition: anticipate ongoing decline, and death in the [facility].     Hospice Plan of care was reviewed in detail and agree with current plan of care    [facility] bedside nurse and [facility]  Prognosis estimated based on 03/28/23 clinical assessment is:   [x] Hours to Days    [] Days to Weeks    [] Other:    Communicated plan of care with: Hospice Case Manager; Hospice IDT; Care Team     GOALS OF CARE     Patient/Medical POA stated Goal of Care: goal of care is comfort     [x] I have reviewed and/or updated ACP information in the Advance Care Planning Navigator. This information is available in the [address] link in the patient's chart header. Primary Decision [facility] Agent):   Primary Decision Maker: Braydenkerenus Garcia - Daughter - [phone]    Secondary Decision Maker: Nolan Wilhelm - Brother - [phone]    Resuscitation Status: DNR  If DNR is there a Durable DNR on file? : [x] Yes [] No (If no, complete Durable DNR)    HISTORY     History obtained from: patient, hospice nurse    CHIEF COMPLAINT: nausea, dysgeusia, vomiting, poor PO intake, constipation  The patient is:   [x] Verbal  [] Nonverbal  [] Unresponsive    HPI/SUBJECTIVE:    78year old female at home supported by [employer]. She has about one week of difficult to control symptoms. Fentanyl patch and liquid morphine for pain has not been effective as she is not able to keep the morphine down. The patch was changed to Q48 hrs when it was noted that the 3rd day was when she had more pain. She has been using enemas and senokot (2 tabs BID) but still has not had a BM in the past 5 days (since Tuesday)  She is vomiting up meds or food but is able to keep down sips of water and tea. Pain is 4/10 right now across her abdomen, it is worse with ambulation  No real change in distention, some slight bloating since summertime    3/6-continues to describe diffuse pain in her abdomen. Even with just palpation of my stethoscope cause discomfort. Continuing not eating or drinking well    3/7-patient continues to have significant pain in her abdomen. Feels like her abdomen is more distended.     3/8-patient anxious but feels like pain is better managed overall. Still with nausea    3/9-patient appears more comfortable this morning. A little tearful at times. Nausea and pain seems somewhat improved    3/10-patient appears much more comfortable this morning. She actually was able to have a little bit of a smile. Still only tolerating ice chips. 3/11- patient drowsy but wakes from sleep, slowed processing evident, irritable today, major shift in personality per RN    3/13-patient arouses easily. Definitely struggling with processing and coming up with her words. A little bit more irritable today than when I saw her on Friday. 3/14-patient appears little more ashen. A little slow to respond. Definitely getting weaker. Discussed the possibility of a Sigala catheter and she will think about it    3/15-patient very anxious and tearful this morning. 3/16-patient sleeping multiple times when I checked on her    3/17-patient is resting and appears much more comfortable. I think this is actually the best she has looked in several days and she actually looks at peace. 3/18/23: patient aroused said \": hi baby ' to his brother at bed side, some restlessness noted . Per bed side Rn patient requires 4 doses of fentanyl bolus in last 4 hrs and two doses of haldol prn today . 3/20-patient much more restless. Clearly the Versed is not lasting long enough but it is effective when she takes it. Adjustments have been made    3/21-patient significantly more restless earlier today but with adjustment in her medication, addition of Haldol, and adjustment in her needle to the port, patient does appear more comfortable    3/22-appears to be resting right now but reviewed nurses notes on her restlessness    3/23-she is finally resting this morning but once again has needed multiple doses of medication overnight. 6 bolus doses of fentanyl    3/24-patient resting, actually appears comfortable.     3/25-patient resting this morning. Does appear comfortable but required multiple as needed doses of her PCA overnight    3/26-patient does appear comfortable this afternoon when I see her less restless, no grimacing    3/27-patient comfortable this morning.   No evidence of restlessness    3/28-patient's unresponsive but showing evidence of increased work of breathing, nonverbal signs of pain       REVIEW OF SYSTEMS     The following systems were: [x] reviewed  [] unable to be reviewed    Positive ROS include:  Constitutional: fatigue, weakness, in pain especially with movement  Ears/nose/mouth/throat:   Respiratory:  Gastrointestinal:poor appetite, nausea, vomiting, abdominal pain, constipation - last BM Tuesday  Everything tastes horrible  Musculoskeletal:pain,   Neurologic:poor sleep, not more than 1-2 hours per night      Adult Non-Verbal Pain Assessment Score: 6    Face  [] 0   No particular expression or smile  [] 1   Occasional grimace, tearing, frowning, wrinkled forehead  [x] 2   Frequent grimace, tearing, frowning, wrinkled forehead    Activity (movement)  [] 0   Lying quietly, normal position  [] 1   Seeking attention through movement or slow, cautious movement  [x] 2   Restless, excessive activity and/or withdrawal reflexes    Guarding  [x] 0   Lying quietly, no positioning of hands over areas of body  [] 1   Splinting areas of the body, tense  [] 2   Rigid, stiff    Physiology (vital signs)  [x] 0   Stable vital signs  [] 1   Change in any of the following: SBP > 20mm Hg; HR > 20/minute  [] 2   Change in any of the following: SBP > 30mm Hg; HR > 25/minute    Respiratory  [] 0   Baseline RR/SpO2, compliant with ventilator  [] 1   RR > 10 above baseline, or 5% drop SpO2, mild asynchrony with ventilator  [x] 2   RR > 20 above baseline, or 10% drop SpO2, asynchrony with ventilator     FUNCTIONAL ASSESSMENT     Palliative Performance Scale (PPS):10       PSYCHOSOCIAL/SPIRITUAL ASSESSMENT     Active Problems:    Ovarian cancer [facility]) (3/5/2023)    Past Medical History:   Diagnosis Date    Anxiety     Hypotension     Thyroid disease       No past surgical history on file. Social History     Tobacco Use    Smoking status: Not on file    Smokeless tobacco: Not on file   Substance Use Topics    Alcohol use: Not on file     No family history on file. Allergies   Allergen Reactions    Hydromorphone Nausea and Vomiting    Sulfa (Sulfonamide Antibiotics) Diarrhea and Nausea and Vomiting      Current Facility-Administered Medications   Medication Dose Route Frequency    glycopyrrolate (ROBINUL) injection 0.2 mg  0.2 mg IntraVENous Q4H    HYDROmorphone 30 mg/30 mL (DILAUDID) PCA   IntraVENous CONTINUOUS    ketorolac (TORADOL) injection 30 mg  30 mg IntraVENous Q6H PRN    LORazepam (ATIVAN) injection 2 mg  2 mg IntraVENous Q4H    LORazepam (ATIVAN) injection 2 mg  2 mg IntraVENous Q15MIN PRN    haloperidol lactate (HALDOL) injection 5 mg  5 mg IntraVENous Q4H    PHENobarbital (LUMINAL) injection 130 mg  130 mg IntraVENous TID    ondansetron (ZOFRAN) injection 4 mg  4 mg IntraVENous Q6H PRN    bisacodyL (DULCOLAX) suppository 10 mg  10 mg Rectal DAILY PRN        PHYSICAL EXAM     Wt Readings from Last 3 Encounters:   12/08/22 66.7 kg (147 lb)   01/04/18 63.5 kg (140 lb)       Visit Vitals  BP (!) 84/49 (BP 1 Location: Left lower arm, BP Patient Position: Lying; At rest)   Pulse (!) 53   Temp 97 °F (36.1 °C)   Resp 29   SpO2 (!) 69%       Supplemental O2  [] Yes  [x] NO  Last bowel movement: 2 weeks ago    Currently this patient has:  [] Peripheral IV [] PICC  [x] PORT [] ICD    [] Sigala Catheter [] NG Tube   [] PEG Tube    [] Rectal Tube [] Drain  [] Other:     General-unresponsive but ill-appearing, increased work of breathing, more restless, slight grimacing   Eyes: anicteric, EOMI  ENMT: slightly dry oral mucosa  Cardiovascular: reg  Respiratory: Mild increased work of breathing, mild accessory muscle use  Gastrointestinal: abdomen is distended, firm, hypoactive bowel sounds, no grimacing with palpation today  Musculoskeletal:nl  Skin:intact, cool extremities  Neurologic: Minimally responsive  Psychiatric: Restless  Pertinent Lab and or Imaging Tests:  Lab Results   Component Value Date/Time    Sodium 140 01/03/2018 11:59 AM    Potassium 3.7 01/03/2018 11:59 AM    Chloride 105 01/03/2018 11:59 AM    CO2 28 01/03/2018 11:59 AM    Anion gap 7 01/03/2018 11:59 AM    Glucose 96 01/03/2018 11:59 AM    BUN 15 01/03/2018 11:59 AM    Creatinine 0.80 01/03/2018 11:59 AM    BUN/Creatinine ratio 19 01/03/2018 11:59 AM    GFR est AA >60 01/03/2018 11:59 AM    GFR est non-AA >60 01/03/2018 11:59 AM    Calcium 8.9 01/03/2018 11:59 AM     Lab Results   Component Value Date/Time    Protein, total 7.6 01/03/2018 11:59 AM    Albumin 3.9 01/03/2018 11:59 AM           Total time:   Counseling / coordination time:   > 50% counseling / coordination?:

## 2023-03-28 NOTE — PROGRESS NOTES
Problem: Breathing Pattern - Ineffective  Goal: *Use of effective breathing techniques  Outcome: Progressing Towards Goal     Problem: Pain  Goal: *Control of acute pain  Outcome: Progressing Towards Goal     Problem: Pressure Injury - Risk of  Goal: *Prevention of pressure injury  Outcome: Progressing Towards Goal  Note: Pressure Injury Interventions:  Sensory Interventions: Assess changes in LOC, Check visual cues for pain, Float heels, Keep linens dry and wrinkle-free, Minimize linen layers, Pressure redistribution bed/mattress (bed type)    Moisture Interventions: Absorbent underpads, Internal/External urinary devices, Minimize layers    Activity Interventions: Pressure redistribution bed/mattress(bed type)    Mobility Interventions: Float heels, Pressure redistribution bed/mattress (bed type)    Nutrition Interventions: Document food/fluid/supplement intake    Friction and Shear Interventions: HOB 30 degrees or less, Minimize layers          Problem: Grieving  Goal: *Able to express feelings of grief  Outcome: Progressing Towards Goal  Goal: *Able to identify stages of grieving process  Outcome: Progressing Towards Goal     Problem: Mood - Altered  Goal: *Alleviation of anxiety and depressive symptoms  Outcome: Progressing Towards Goal     Problem: Discharge Planning  Goal: *Participates in discharge planning  Outcome: Progressing Towards Goal     Problem: Patient Education: Go to Patient Education Activity  Goal: Patient/Family Education  Outcome: Progressing Towards Goal     Problem: Pressure Injury - Risk of  Goal: *Prevention of pressure injury  Description: Document Markie Scale and appropriate interventions in the flowsheet.   Outcome: Progressing Towards Goal  Note: Pressure Injury Interventions:  Sensory Interventions: Assess changes in LOC, Check visual cues for pain, Float heels, Keep linens dry and wrinkle-free, Minimize linen layers, Pressure redistribution bed/mattress (bed type)    Moisture Interventions: Absorbent underpads, Internal/External urinary devices, Minimize layers    Activity Interventions: Pressure redistribution bed/mattress(bed type)    Mobility Interventions: Float heels, Pressure redistribution bed/mattress (bed type)    Nutrition Interventions: Document food/fluid/supplement intake    Friction and Shear Interventions: HOB 30 degrees or less, Minimize layers            Problem: Patient Education: Go to Patient Education Activity  Goal: Patient/Family Education  Outcome: Progressing Towards Goal     Problem: Falls - Risk of  Goal: *Absence of Falls  Description: Document Balta Fall Risk and appropriate interventions in the flowsheet. Outcome: Progressing Towards Goal  Note: Fall Risk Interventions:  Mobility Interventions: Bed/chair exit alarm    Mentation Interventions: Bed/chair exit alarm, Reorient patient    Medication Interventions: Bed/chair exit alarm    Elimination Interventions: Bed/chair exit alarm          Problem: Patient Education: Go to Patient Education Activity  Goal: Patient/Family Education  Outcome: Progressing Towards Goal     Problem: Infection - Risk of, Central Venous Catheter-Associated Bloodstream Infection  Goal: *Absence of infection signs and symptoms  Outcome: Progressing Towards Goal     Problem: Potential for Alteration in Skin Integrity  Goal: Monitor skin for areas of alteration in skin integrity  Description: Patient/family/caregiver will demonstrate ability to care for patient's skin, monitor for areas of breakdown, and demonstrate methods to prevent breakdown during hospice care. Outcome: Progressing Towards Goal     Problem: Risk for Falls  Goal: Free of falls during inpatient stay  Description: Patient will be free of falls during inpatient stay.   Outcome: Progressing Towards Goal     Problem: Alteration in Mobility  Goal: Remain as independent as possible and remain safe in environment  Description: Patient will remain as independent as possible and remain safe in their environment. Outcome: Progressing Towards Goal     Problem: Pain  Goal: Assess satisfaction of level of comfort and symptom control  Outcome: Progressing Towards Goal  Goal: *Control of acute pain  Outcome: Progressing Towards Goal     Problem: Anxiety/Agitation  Goal: Verbalize or staff assess the ability to manage anxiety  Description: The patient/family/caregiver will verbalize and demonstrate ability to manage the patient's anxiety throughout hospice care. Outcome: Progressing Towards Goal     Problem: Communication Deficit  Goal: Effectively communicate symptoms, needs, and concerns  Description: Patient/family/caregiver will effectively communicate symptoms, needs and concerns. Outcome: Progressing Towards Goal     Problem: Coping and Emotional Distress  Goal: Demonstrate acceptance of terminal illness and understanding of disease progression  Description: Patient/family/caregiver will demonstrate acceptance of terminal disease and understanding of disease progression while employing appropriate coping mechanisms.   Outcome: Progressing Towards Goal     Problem: Pressure Injury - Risk of  Goal: *Prevention of pressure injury  Outcome: Progressing Towards Goal     Problem: Nausea/Vomiting (Adult)  Goal: *Absence of nausea/vomiting  Outcome: Progressing Towards Goal     Problem: Infection - Risk of, Central Venous Catheter-Associated Bloodstream Infection  Goal: *Absence of infection signs and symptoms  Outcome: Progressing Towards Goal     Problem: Infection - Risk of, Urinary Catheter-Associated Urinary Tract Infection  Goal: *Absence of infection signs and symptoms  Outcome: Progressing Towards Goal     Problem: Infection - Risk of, Urinary Catheter-Associated Urinary Tract Infection  Goal: *Absence of infection signs and symptoms  Outcome: Progressing Towards Goal     Problem: Imminent Death  Goal: Collaborate with patient/family/caregiver/interdisciplinary team to minimize and manage end of life symptoms  Outcome: Progressing Towards Goal

## 2023-03-29 NOTE — PROGRESS NOTES
Problem: Pain  Goal: *Control of acute pain  Outcome: Progressing Towards Goal     Problem: Pressure Injury - Risk of  Goal: *Prevention of pressure injury  Outcome: Progressing Towards Goal  Note: Pressure Injury Interventions:  Sensory Interventions: Assess changes in LOC, Check visual cues for pain, Float heels, Keep linens dry and wrinkle-free, Minimize linen layers, Pressure redistribution bed/mattress (bed type)    Moisture Interventions: Absorbent underpads, Apply protective barrier, creams and emollients, Internal/External urinary devices, Maintain skin hydration (lotion/cream), Minimize layers, Moisture barrier    Activity Interventions: Pressure redistribution bed/mattress(bed type)    Mobility Interventions: Float heels, HOB 30 degrees or less, Pressure redistribution bed/mattress (bed type)    Nutrition Interventions:  (NPO)    Friction and Shear Interventions: HOB 30 degrees or less, Lift sheet, Lift team/patient mobility team, Minimize layers                Problem: Falls - Risk of  Goal: *Absence of Falls  Description: Document Balta Fall Risk and appropriate interventions in the flowsheet.   Outcome: Progressing Towards Goal  Note: Fall Risk Interventions:  Mobility Interventions: Bed/chair exit alarm    Mentation Interventions: Bed/chair exit alarm, Reorient patient    Medication Interventions: Bed/chair exit alarm    Elimination Interventions: Bed/chair exit alarm              Problem: Infection - Risk of, Central Venous Catheter-Associated Bloodstream Infection  Goal: *Absence of infection signs and symptoms  Outcome: Progressing Towards Goal     Problem: Infection - Risk of, Urinary Catheter-Associated Urinary Tract Infection  Goal: *Absence of infection signs and symptoms  Outcome: Progressing Towards Goal

## 2024-04-15 NOTE — PROGRESS NOTES
From: Jana Christine  Sent: 4/15/2024 8:43 AM EDT  To: [facility] [address]  Subject: Fang prescription    Thank you so much. The [facility] in [place] at [address]. I will pick it up today - and then take two pills, since I couldn't take one yesterday.  I will call today to schedule my annual checkup.   Problem: Pain  Goal: *Control of acute pain  Outcome: Progressing Towards Goal     Problem: Pressure Injury - Risk of  Goal: *Prevention of pressure injury  Outcome: Progressing Towards Goal  Note: Pressure Injury Interventions:  Sensory Interventions: Check visual cues for pain, Float heels, Minimize linen layers    Moisture Interventions: Absorbent underpads, Internal/External urinary devices, Maintain skin hydration (lotion/cream)    Activity Interventions: Pressure redistribution bed/mattress(bed type)    Mobility Interventions: Float heels, HOB 30 degrees or less    Nutrition Interventions: Document food/fluid/supplement intake    Friction and Shear Interventions: Apply protective barrier, creams and emollients, Lift sheet                Problem: Falls - Risk of  Goal: *Absence of Falls  Description: Document Balta Fall Risk and appropriate interventions in the flowsheet.   Outcome: Progressing Towards Goal  Note: Fall Risk Interventions:  Mobility Interventions: Bed/chair exit alarm    Mentation Interventions: Bed/chair exit alarm, Reorient patient    Medication Interventions: Bed/chair exit alarm    Elimination Interventions: Bed/chair exit alarm